# Patient Record
Sex: MALE | Race: WHITE | Employment: OTHER | URBAN - METROPOLITAN AREA
[De-identification: names, ages, dates, MRNs, and addresses within clinical notes are randomized per-mention and may not be internally consistent; named-entity substitution may affect disease eponyms.]

---

## 2017-02-22 DIAGNOSIS — Z13.228 SCREENING FOR METABOLIC DISORDER: Primary | ICD-10-CM

## 2017-02-22 DIAGNOSIS — Z13.220 SCREENING FOR LIPID DISORDERS: ICD-10-CM

## 2017-02-22 DIAGNOSIS — Z13.0 SCREENING FOR DISORDER OF BLOOD AND BLOOD-FORMING ORGANS: ICD-10-CM

## 2017-02-22 DIAGNOSIS — I10 ESSENTIAL HYPERTENSION: ICD-10-CM

## 2017-02-22 DIAGNOSIS — Z13.29 SCREENING FOR THYROID DISORDER: ICD-10-CM

## 2017-02-23 RX ORDER — LISINOPRIL 10 MG/1
10 TABLET ORAL DAILY
Qty: 30 TABLET | Refills: 0 | Status: SHIPPED | OUTPATIENT
Start: 2017-02-23 | End: 2017-03-19

## 2017-02-23 NOTE — TELEPHONE ENCOUNTER
Per protocol;  Failed - Route to provider    Patient is due for repeat cmp (all labs are due) orders have been pended   Routed to  as well for scheduling for an overdue office visit (CPE)

## 2017-02-23 NOTE — TELEPHONE ENCOUNTER
From: Chance Pereira  To:  Monroe Brandon MD  Sent: 2/22/2017 8:44 PM CST  Subject: Medication Renewal Request    Original authorizing provider: MD Chance Meehan would like a refill of the following medications:  lisinopril (PRINIVIL,ZESTRIL)

## 2017-03-16 ENCOUNTER — TELEPHONE (OUTPATIENT)
Dept: INTERNAL MEDICINE CLINIC | Facility: CLINIC | Age: 62
End: 2017-03-16

## 2017-03-16 RX ORDER — LISINOPRIL 10 MG/1
TABLET ORAL
Qty: 30 TABLET | Refills: 0 | Status: SHIPPED | OUTPATIENT
Start: 2017-03-16 | End: 2017-04-12

## 2017-03-16 NOTE — TELEPHONE ENCOUNTER
Pt due for OV and labs which were ordered.  please call to schedule, thanks. Per protocol sent to provider.

## 2017-03-20 RX ORDER — LISINOPRIL 10 MG/1
TABLET ORAL
Qty: 30 TABLET | Refills: 0 | Status: SHIPPED | OUTPATIENT
Start: 2017-03-20 | End: 2020-02-28

## 2017-03-20 NOTE — TELEPHONE ENCOUNTER
Per protocol; failed - route to provider  Patient is due for repeat CMP/BMP- also due for office visit follow up   Routed to  as well for scheduling

## 2017-04-12 RX ORDER — LISINOPRIL 10 MG/1
TABLET ORAL
Qty: 30 TABLET | Refills: 0 | Status: SHIPPED | OUTPATIENT
Start: 2017-04-12 | End: 2017-06-01

## 2017-04-12 NOTE — TELEPHONE ENCOUNTER
Per protocol; failed - route to provider   Patient is due for repeat cmp and office visit for physical - routed to  for scheduling ;

## 2017-05-08 RX ORDER — LISINOPRIL 10 MG/1
TABLET ORAL
Qty: 30 TABLET | Refills: 0 | Status: SHIPPED | OUTPATIENT
Start: 2017-05-08 | End: 2020-02-28

## 2017-06-01 RX ORDER — LISINOPRIL 10 MG/1
TABLET ORAL
Qty: 30 TABLET | Refills: 0 | Status: SHIPPED | OUTPATIENT
Start: 2017-06-01 | End: 2020-02-28

## 2021-03-18 ENCOUNTER — IMMUNIZATION (OUTPATIENT)
Dept: LAB | Age: 66
End: 2021-03-18
Attending: HOSPITALIST
Payer: COMMERCIAL

## 2021-03-18 DIAGNOSIS — Z23 NEED FOR VACCINATION: Primary | ICD-10-CM

## 2021-03-18 PROCEDURE — 0001A SARSCOV2 VAC 30MCG/0.3ML IM: CPT

## 2021-04-08 ENCOUNTER — IMMUNIZATION (OUTPATIENT)
Dept: LAB | Age: 66
End: 2021-04-08
Attending: HOSPITALIST
Payer: COMMERCIAL

## 2021-04-08 DIAGNOSIS — Z23 NEED FOR VACCINATION: Primary | ICD-10-CM

## 2021-04-08 PROCEDURE — 0002A SARSCOV2 VAC 30MCG/0.3ML IM: CPT

## 2021-07-19 ENCOUNTER — OFFICE VISIT (OUTPATIENT)
Dept: INTERNAL MEDICINE CLINIC | Facility: CLINIC | Age: 66
End: 2021-07-19
Payer: COMMERCIAL

## 2021-07-19 VITALS
HEIGHT: 72 IN | SYSTOLIC BLOOD PRESSURE: 122 MMHG | BODY MASS INDEX: 26.14 KG/M2 | DIASTOLIC BLOOD PRESSURE: 82 MMHG | WEIGHT: 193 LBS | RESPIRATION RATE: 18 BRPM

## 2021-07-19 DIAGNOSIS — Z12.5 PROSTATE CANCER SCREENING: ICD-10-CM

## 2021-07-19 DIAGNOSIS — Z85.72 HX OF LYMPHOMA, NON-HODGKINS: ICD-10-CM

## 2021-07-19 DIAGNOSIS — I10 ESSENTIAL HYPERTENSION: ICD-10-CM

## 2021-07-19 DIAGNOSIS — Z00.00 WELLNESS EXAMINATION: Primary | ICD-10-CM

## 2021-07-19 PROCEDURE — 99387 INIT PM E/M NEW PAT 65+ YRS: CPT | Performed by: FAMILY MEDICINE

## 2021-07-19 PROCEDURE — 3079F DIAST BP 80-89 MM HG: CPT | Performed by: FAMILY MEDICINE

## 2021-07-19 PROCEDURE — 3074F SYST BP LT 130 MM HG: CPT | Performed by: FAMILY MEDICINE

## 2021-07-19 PROCEDURE — 3008F BODY MASS INDEX DOCD: CPT | Performed by: FAMILY MEDICINE

## 2021-07-19 RX ORDER — LISINOPRIL 10 MG/1
10 TABLET ORAL DAILY
Qty: 90 TABLET | Refills: 1 | Status: SHIPPED | OUTPATIENT
Start: 2021-07-19 | End: 2022-02-07

## 2021-07-19 NOTE — PROGRESS NOTES
Gage Menendez  5/13/1955    Patient presents with:  New Patient: MR rm 8 NP [facility]       HPI:   Gage Menendez is a 77year old male who presents to [facility].  He works as a  and health  along with [employer] He takes abdominal pain  NEURO: denies headaches    EXAM:   /88 (BP Location: Left arm, Patient Position: Sitting, Cuff Size: adult)   Resp 18   Ht 6' (1.829 m)   Wt 193 lb (87.5 kg)   BMI 26.18 kg/m²   GENERAL: Well developed, well nourished  SKIN: No rashes

## 2022-02-07 ENCOUNTER — OFFICE VISIT (OUTPATIENT)
Dept: INTERNAL MEDICINE CLINIC | Facility: CLINIC | Age: 67
End: 2022-02-07
Payer: COMMERCIAL

## 2022-02-07 VITALS
BODY MASS INDEX: 24.79 KG/M2 | SYSTOLIC BLOOD PRESSURE: 126 MMHG | HEIGHT: 72 IN | WEIGHT: 183 LBS | HEART RATE: 94 BPM | DIASTOLIC BLOOD PRESSURE: 82 MMHG | TEMPERATURE: 98 F

## 2022-02-07 DIAGNOSIS — I10 ESSENTIAL HYPERTENSION: ICD-10-CM

## 2022-02-07 PROCEDURE — 3008F BODY MASS INDEX DOCD: CPT | Performed by: FAMILY MEDICINE

## 2022-02-07 PROCEDURE — 3079F DIAST BP 80-89 MM HG: CPT | Performed by: FAMILY MEDICINE

## 2022-02-07 PROCEDURE — 99213 OFFICE O/P EST LOW 20 MIN: CPT | Performed by: FAMILY MEDICINE

## 2022-02-07 PROCEDURE — 3074F SYST BP LT 130 MM HG: CPT | Performed by: FAMILY MEDICINE

## 2022-02-07 RX ORDER — LISINOPRIL 10 MG/1
10 TABLET ORAL DAILY
Qty: 90 TABLET | Refills: 1 | Status: SHIPPED | OUTPATIENT
Start: 2022-02-07

## 2022-08-22 DIAGNOSIS — I10 ESSENTIAL HYPERTENSION: ICD-10-CM

## 2022-08-23 NOTE — TELEPHONE ENCOUNTER
Last OV 2.7.22 w/ [address] (HTN)   Last PE 7.19.21-Overdue   Last REFILL 2.7.22 Lisinopril 10mg #90 1R  Last LAB No recent labs within last 12 months     Future Appointments   Date Time Provider Nicole Martin   9/1/2022 10:20 AM Liv Chang MD [facility]         Per PROTOCOL?  FAILED-no appt in last 6 months or next 3 months, no CMP or BMP in last 12 months     Please Advise

## 2022-08-24 ENCOUNTER — PATIENT MESSAGE (OUTPATIENT)
Dept: INTERNAL MEDICINE CLINIC | Facility: CLINIC | Age: 67
End: 2022-08-24

## 2022-08-24 RX ORDER — LISINOPRIL 10 MG/1
10 TABLET ORAL DAILY
Qty: 90 TABLET | Refills: 1 | Status: SHIPPED | OUTPATIENT
Start: 2022-08-24

## 2022-08-24 NOTE — TELEPHONE ENCOUNTER
Please advise on patient message regarding labs.   Next office visit - 9/1/22 - Med Check   Last office visit - 2/7/22  Last wellness exam - 7/19/21

## 2022-08-24 NOTE — TELEPHONE ENCOUNTER
From: Radha Leonardo  To: Yadira Corea MD  Sent: 8/24/2022 10:29 AM CDT  Subject: Blood Work    I have an appointment on Thursday, 9/1. Can I also get a blood draw on that same day? My insurance company tells me that if I have a blood draw on the same day as my PCP appointment, my plan will cover the cost. What I need is CBC (Complete Blood Count), CMP (Comprehensive Metabolic Panel), Lipid Panel, and Urine Analysis.

## 2022-08-25 NOTE — TELEPHONE ENCOUNTER
Patient Notified of PCP's recommendations via My Chart. Patient confirmed he is able to fast before upcomming appointment.

## 2022-09-01 ENCOUNTER — LAB ENCOUNTER (OUTPATIENT)
Dept: LAB | Age: 67
End: 2022-09-01
Attending: FAMILY MEDICINE
Payer: COMMERCIAL

## 2022-09-01 ENCOUNTER — OFFICE VISIT (OUTPATIENT)
Dept: INTERNAL MEDICINE CLINIC | Facility: CLINIC | Age: 67
End: 2022-09-01
Payer: COMMERCIAL

## 2022-09-01 VITALS
HEART RATE: 66 BPM | RESPIRATION RATE: 18 BRPM | BODY MASS INDEX: 23.59 KG/M2 | WEIGHT: 174.19 LBS | SYSTOLIC BLOOD PRESSURE: 106 MMHG | DIASTOLIC BLOOD PRESSURE: 70 MMHG | HEIGHT: 72 IN

## 2022-09-01 DIAGNOSIS — I10 ESSENTIAL HYPERTENSION: ICD-10-CM

## 2022-09-01 DIAGNOSIS — Z12.5 PROSTATE CANCER SCREENING: ICD-10-CM

## 2022-09-01 DIAGNOSIS — Z13.220 LIPID SCREENING: ICD-10-CM

## 2022-09-01 DIAGNOSIS — Z00.00 WELLNESS EXAMINATION: ICD-10-CM

## 2022-09-01 DIAGNOSIS — Z13.0 SCREENING FOR DEFICIENCY ANEMIA: ICD-10-CM

## 2022-09-01 DIAGNOSIS — Z00.00 WELLNESS EXAMINATION: Primary | ICD-10-CM

## 2022-09-01 DIAGNOSIS — I10 PRIMARY HYPERTENSION: ICD-10-CM

## 2022-09-01 LAB
ALBUMIN SERPL-MCNC: 3.9 G/DL (ref 3.4–5)
ALBUMIN/GLOB SERPL: 1.3 {RATIO} (ref 1–2)
ALP LIVER SERPL-CCNC: 49 U/L
ALT SERPL-CCNC: 22 U/L
ANION GAP SERPL CALC-SCNC: 7 MMOL/L (ref 0–18)
AST SERPL-CCNC: 14 U/L (ref 15–37)
BASOPHILS # BLD AUTO: 0.06 X10(3) UL (ref 0–0.2)
BASOPHILS NFR BLD AUTO: 0.9 %
BILIRUB SERPL-MCNC: 1.1 MG/DL (ref 0.1–2)
BUN BLD-MCNC: 12 MG/DL (ref 7–18)
BUN/CREAT SERPL: 14.6 (ref 10–20)
CALCIUM BLD-MCNC: 8.9 MG/DL (ref 8.5–10.1)
CHLORIDE SERPL-SCNC: 108 MMOL/L (ref 98–112)
CHOLEST SERPL-MCNC: 211 MG/DL (ref ?–200)
CO2 SERPL-SCNC: 24 MMOL/L (ref 21–32)
COMPLEXED PSA SERPL-MCNC: 8.22 NG/ML (ref ?–4)
CREAT BLD-MCNC: 0.82 MG/DL
DEPRECATED RDW RBC AUTO: 40.9 FL (ref 35.1–46.3)
EOSINOPHIL # BLD AUTO: 0.52 X10(3) UL (ref 0–0.7)
EOSINOPHIL NFR BLD AUTO: 7.6 %
ERYTHROCYTE [DISTWIDTH] IN BLOOD BY AUTOMATED COUNT: 11.8 % (ref 11–15)
FASTING PATIENT LIPID ANSWER: YES
FASTING STATUS PATIENT QL REPORTED: YES
GFR SERPLBLD BASED ON 1.73 SQ M-ARVRAT: 96 ML/MIN/1.73M2 (ref 60–?)
GLOBULIN PLAS-MCNC: 3 G/DL (ref 2.8–4.4)
GLUCOSE BLD-MCNC: 96 MG/DL (ref 70–99)
HCT VFR BLD AUTO: 48.5 %
HDLC SERPL-MCNC: 53 MG/DL (ref 40–59)
HGB BLD-MCNC: 16.8 G/DL
IMM GRANULOCYTES # BLD AUTO: 0.02 X10(3) UL (ref 0–1)
IMM GRANULOCYTES NFR BLD: 0.3 %
LDLC SERPL CALC-MCNC: 148 MG/DL (ref ?–100)
LYMPHOCYTES # BLD AUTO: 1.38 X10(3) UL (ref 1–4)
LYMPHOCYTES NFR BLD AUTO: 20.2 %
MCH RBC QN AUTO: 32.1 PG (ref 26–34)
MCHC RBC AUTO-ENTMCNC: 34.6 G/DL (ref 31–37)
MCV RBC AUTO: 92.7 FL
MONOCYTES # BLD AUTO: 0.54 X10(3) UL (ref 0.1–1)
MONOCYTES NFR BLD AUTO: 7.9 %
NEUTROPHILS # BLD AUTO: 4.31 X10 (3) UL (ref 1.5–7.7)
NEUTROPHILS # BLD AUTO: 4.31 X10(3) UL (ref 1.5–7.7)
NEUTROPHILS NFR BLD AUTO: 63.1 %
NONHDLC SERPL-MCNC: 158 MG/DL (ref ?–130)
OSMOLALITY SERPL CALC.SUM OF ELEC: 288 MOSM/KG (ref 275–295)
PLATELET # BLD AUTO: 163 10(3)UL (ref 150–450)
POTASSIUM SERPL-SCNC: 3.9 MMOL/L (ref 3.5–5.1)
PROT SERPL-MCNC: 6.9 G/DL (ref 6.4–8.2)
RBC # BLD AUTO: 5.23 X10(6)UL
SODIUM SERPL-SCNC: 139 MMOL/L (ref 136–145)
TRIGL SERPL-MCNC: 57 MG/DL (ref 30–149)
VLDLC SERPL CALC-MCNC: 11 MG/DL (ref 0–30)
WBC # BLD AUTO: 6.8 X10(3) UL (ref 4–11)

## 2022-09-01 PROCEDURE — 3008F BODY MASS INDEX DOCD: CPT | Performed by: FAMILY MEDICINE

## 2022-09-01 PROCEDURE — 80061 LIPID PANEL: CPT

## 2022-09-01 PROCEDURE — 85025 COMPLETE CBC W/AUTO DIFF WBC: CPT

## 2022-09-01 PROCEDURE — 36415 COLL VENOUS BLD VENIPUNCTURE: CPT

## 2022-09-01 PROCEDURE — 3078F DIAST BP <80 MM HG: CPT | Performed by: FAMILY MEDICINE

## 2022-09-01 PROCEDURE — 80053 COMPREHEN METABOLIC PANEL: CPT

## 2022-09-01 PROCEDURE — 99397 PER PM REEVAL EST PAT 65+ YR: CPT | Performed by: FAMILY MEDICINE

## 2022-09-01 PROCEDURE — 3074F SYST BP LT 130 MM HG: CPT | Performed by: FAMILY MEDICINE

## 2022-09-01 RX ORDER — LISINOPRIL 10 MG/1
10 TABLET ORAL DAILY
Qty: 90 TABLET | Refills: 3 | Status: SHIPPED | OUTPATIENT
Start: 2022-09-01

## 2022-09-08 ENCOUNTER — PATIENT MESSAGE (OUTPATIENT)
Dept: INTERNAL MEDICINE CLINIC | Facility: CLINIC | Age: 67
End: 2022-09-08

## 2024-02-05 DIAGNOSIS — I10 ESSENTIAL HYPERTENSION: ICD-10-CM

## 2024-02-06 RX ORDER — LISINOPRIL 10 MG/1
10 TABLET ORAL DAILY
Qty: 30 TABLET | Refills: 0 | Status: SHIPPED | OUTPATIENT
Start: 2024-02-06

## 2024-02-06 NOTE — TELEPHONE ENCOUNTER
Last VISIT 9/1/2022-[facility]    Last CPE 9/1/2022    Last REFILL  lisinopril 10 MG Oral Tab 90 tablet 3 9/1/2022 --    Sig - Route: Take 1 tablet (10 mg total) by mouth daily. - Oral    Sent to pharmacy as: Lisinopril 10 MG Oral Tablet (Zestril)    E-Prescribing Status: Receipt confirmed by pharmacy (9/1/2022 10:25 AM CDT)        Last LABS 9/1/2022-Lipid,CBC,CMP    No future appointments.      Per PROTOCOL? Failed    Please Approve or Deny.

## 2024-03-06 DIAGNOSIS — I10 ESSENTIAL HYPERTENSION: ICD-10-CM

## 2024-03-06 RX ORDER — LISINOPRIL 10 MG/1
10 TABLET ORAL DAILY
Qty: 30 TABLET | Refills: 0 | Status: SHIPPED | OUTPATIENT
Start: 2024-03-06

## 2024-03-06 NOTE — TELEPHONE ENCOUNTER
Last VISIT:09/01/2022 MD HA    Last CPE:09/01/2022    Last REFILL:02/06/2024   Medication Quantity Refills Start End   LISINOPRIL 10 MG Oral Tab 30 tablet 0         Last LABS:09/01/2022    No future appointments.      Per PROTOCOL? Non Protocol    Please Approve or Deny.

## 2024-05-28 ENCOUNTER — PATIENT OUTREACH (OUTPATIENT)
Dept: CASE MANAGEMENT | Age: 69
End: 2024-05-28

## 2024-05-28 NOTE — PROCEDURES
The office order for PCP removal request is Approved and finalized on May 28, 2024.    Thanks,  [facility] Team

## 2025-01-01 ENCOUNTER — APPOINTMENT (OUTPATIENT)
Dept: GENERAL RADIOLOGY | Facility: HOSPITAL | Age: 70
End: 2025-01-01
Attending: NURSE PRACTITIONER
Payer: MEDICARE

## 2025-01-01 ENCOUNTER — APPOINTMENT (OUTPATIENT)
Facility: HOSPITAL | Age: 70
End: 2025-01-01
Attending: STUDENT IN AN ORGANIZED HEALTH CARE EDUCATION/TRAINING PROGRAM
Payer: MEDICARE

## 2025-01-01 ENCOUNTER — APPOINTMENT (OUTPATIENT)
Dept: MRI IMAGING | Facility: HOSPITAL | Age: 70
End: 2025-01-01
Attending: STUDENT IN AN ORGANIZED HEALTH CARE EDUCATION/TRAINING PROGRAM
Payer: MEDICARE

## 2025-01-01 ENCOUNTER — APPOINTMENT (OUTPATIENT)
Dept: INTERVENTIONAL RADIOLOGY/VASCULAR | Facility: HOSPITAL | Age: 70
End: 2025-01-01
Attending: INTERNAL MEDICINE
Payer: MEDICARE

## 2025-01-01 ENCOUNTER — APPOINTMENT (OUTPATIENT)
Dept: GENERAL RADIOLOGY | Facility: HOSPITAL | Age: 70
End: 2025-01-01
Attending: EMERGENCY MEDICINE
Payer: MEDICARE

## 2025-01-01 ENCOUNTER — HOSPITAL ENCOUNTER (OUTPATIENT)
Dept: NUCLEAR MEDICINE | Facility: HOSPITAL | Age: 70
Discharge: HOME | End: 2025-01-01
Attending: STUDENT IN AN ORGANIZED HEALTH CARE EDUCATION/TRAINING PROGRAM
Payer: MEDICARE

## 2025-01-01 ENCOUNTER — HOSPITAL ENCOUNTER (INPATIENT)
Facility: HOSPITAL | Age: 70
LOS: 12 days | End: 2025-01-01
Attending: INTERNAL MEDICINE | Admitting: INTERNAL MEDICINE
Payer: MEDICARE

## 2025-01-01 ENCOUNTER — APPOINTMENT (OUTPATIENT)
Dept: CV DIAGNOSTICS | Facility: HOSPITAL | Age: 70
End: 2025-01-01
Attending: INTERNAL MEDICINE
Payer: MEDICARE

## 2025-01-01 VITALS
BODY MASS INDEX: 24 KG/M2 | TEMPERATURE: 104 F | OXYGEN SATURATION: 83 % | HEART RATE: 120 BPM | RESPIRATION RATE: 14 BRPM | WEIGHT: 173.38 LBS | SYSTOLIC BLOOD PRESSURE: 70 MMHG | DIASTOLIC BLOOD PRESSURE: 50 MMHG

## 2025-01-01 DIAGNOSIS — C83.33 DIFFUSE LARGE B-CELL LYMPHOMA OF INTRA-ABDOMINAL LYMPH NODES (HCC): ICD-10-CM

## 2025-01-01 LAB
ALBUMIN SERPL-MCNC: 2.8 G/DL (ref 3.2–4.8)
ALBUMIN SERPL-MCNC: 2.9 G/DL (ref 3.2–4.8)
ALBUMIN SERPL-MCNC: 3.1 G/DL (ref 3.2–4.8)
ALBUMIN SERPL-MCNC: 3.1 G/DL (ref 3.2–4.8)
ALBUMIN SERPL-MCNC: 3.2 G/DL (ref 3.2–4.8)
ALBUMIN SERPL-MCNC: 3.3 G/DL (ref 3.2–4.8)
ALBUMIN SERPL-MCNC: 3.4 G/DL (ref 3.2–4.8)
ALBUMIN SERPL-MCNC: 3.7 G/DL (ref 3.2–4.8)
ALBUMIN SERPL-MCNC: 3.8 G/DL (ref 3.2–4.8)
ALBUMIN SERPL-MCNC: 3.9 G/DL (ref 3.2–4.8)
ALBUMIN SERPL-MCNC: 3.9 G/DL (ref 3.2–4.8)
ALBUMIN SERPL-MCNC: 4.2 G/DL (ref 3.2–4.8)
ALBUMIN/GLOB SERPL: 1.6 {RATIO} (ref 1–2)
ALBUMIN/GLOB SERPL: 1.8 {RATIO} (ref 1–2)
ALBUMIN/GLOB SERPL: 1.9 {RATIO} (ref 1–2)
ALBUMIN/GLOB SERPL: 1.9 {RATIO} (ref 1–2)
ALP LIVER SERPL-CCNC: 106 U/L (ref 45–117)
ALP LIVER SERPL-CCNC: 123 U/L (ref 45–117)
ALP LIVER SERPL-CCNC: 137 U/L (ref 45–117)
ALP LIVER SERPL-CCNC: 138 U/L (ref 45–117)
ALP LIVER SERPL-CCNC: 149 U/L (ref 45–117)
ALP LIVER SERPL-CCNC: 176 U/L (ref 45–117)
ALP LIVER SERPL-CCNC: 177 U/L (ref 45–117)
ALP LIVER SERPL-CCNC: 209 U/L (ref 45–117)
ALP LIVER SERPL-CCNC: 90 U/L (ref 45–117)
ALP LIVER SERPL-CCNC: 91 U/L (ref 45–117)
ALP LIVER SERPL-CCNC: 92 U/L (ref 45–117)
ALP LIVER SERPL-CCNC: 97 U/L (ref 45–117)
ALT SERPL-CCNC: 148 U/L (ref 10–49)
ALT SERPL-CCNC: 27 U/L (ref 10–49)
ALT SERPL-CCNC: 28 U/L (ref 10–49)
ALT SERPL-CCNC: 28 U/L (ref 10–49)
ALT SERPL-CCNC: 37 U/L (ref 10–49)
ALT SERPL-CCNC: 37 U/L (ref 10–49)
ALT SERPL-CCNC: 50 U/L (ref 10–49)
ALT SERPL-CCNC: 50 U/L (ref 10–49)
ALT SERPL-CCNC: 63 U/L (ref 10–49)
ALT SERPL-CCNC: 79 U/L (ref 10–49)
ANION GAP SERPL CALC-SCNC: 11 MMOL/L (ref 0–18)
ANION GAP SERPL CALC-SCNC: 11 MMOL/L (ref 0–18)
ANION GAP SERPL CALC-SCNC: 12 MMOL/L (ref 0–18)
ANION GAP SERPL CALC-SCNC: 12 MMOL/L (ref 0–18)
ANION GAP SERPL CALC-SCNC: 13 MMOL/L (ref 0–18)
ANION GAP SERPL CALC-SCNC: 15 MMOL/L (ref 0–18)
ANION GAP SERPL CALC-SCNC: 15 MMOL/L (ref 0–18)
ANION GAP SERPL CALC-SCNC: 16 MMOL/L (ref 0–18)
ANION GAP SERPL CALC-SCNC: 17 MMOL/L (ref 0–18)
ANION GAP SERPL CALC-SCNC: 18 MMOL/L (ref 0–18)
ANION GAP SERPL CALC-SCNC: 18 MMOL/L (ref 0–18)
ANION GAP SERPL CALC-SCNC: 24 MMOL/L (ref 0–18)
ANION GAP SERPL CALC-SCNC: 9 MMOL/L (ref 0–18)
ANION GAP SERPL CALC-SCNC: 9 MMOL/L (ref 0–18)
ARTERIAL PATENCY WRIST A: POSITIVE
AST SERPL-CCNC: 167 U/L (ref ?–34)
AST SERPL-CCNC: 21 U/L (ref ?–34)
AST SERPL-CCNC: 212 U/L (ref ?–34)
AST SERPL-CCNC: 30 U/L (ref ?–34)
AST SERPL-CCNC: 377 U/L (ref ?–34)
AST SERPL-CCNC: 42 U/L (ref ?–34)
AST SERPL-CCNC: 58 U/L (ref ?–34)
AST SERPL-CCNC: 58 U/L (ref ?–34)
AST SERPL-CCNC: 67 U/L (ref ?–34)
AST SERPL-CCNC: 71 U/L (ref ?–34)
AST SERPL-CCNC: 77 U/L (ref ?–34)
AST SERPL-CCNC: 99 U/L (ref ?–34)
ATRIAL RATE: 102 BPM
BACTERIA BLD CULT: POSITIVE
BASE EXCESS BLDA CALC-SCNC: -4.9 MMOL/L (ref ?–2)
BASE EXCESS BLDA CALC-SCNC: -6.8 MMOL/L (ref ?–2)
BASE EXCESS BLDV CALC-SCNC: -3.4 MMOL/L
BASOPHILS # BLD AUTO: 0 X10(3) UL (ref 0–0.2)
BASOPHILS # BLD AUTO: 0 X10(3) UL (ref 0–0.2)
BASOPHILS # BLD AUTO: 0.01 X10(3) UL (ref 0–0.2)
BASOPHILS # BLD AUTO: 0.02 X10(3) UL (ref 0–0.2)
BASOPHILS # BLD AUTO: 0.08 X10(3) UL (ref 0–0.2)
BASOPHILS # BLD AUTO: 0.09 X10(3) UL (ref 0–0.2)
BASOPHILS # BLD: 0 X10(3) UL (ref 0–0.2)
BASOPHILS # BLD: 0 X10(3) UL (ref 0–0.2)
BASOPHILS NFR BLD AUTO: 0 %
BASOPHILS NFR BLD AUTO: 0 %
BASOPHILS NFR BLD AUTO: 0.2 %
BASOPHILS NFR BLD AUTO: 0.2 %
BASOPHILS NFR BLD AUTO: 0.7 %
BASOPHILS NFR BLD AUTO: 0.9 %
BASOPHILS NFR BLD: 0 %
BASOPHILS NFR BLD: 0 %
BILIRUB SERPL-MCNC: 0.3 MG/DL (ref 0.2–1.1)
BILIRUB SERPL-MCNC: 0.4 MG/DL (ref 0.2–1.1)
BILIRUB SERPL-MCNC: 0.4 MG/DL (ref 0.2–1.1)
BILIRUB SERPL-MCNC: 0.6 MG/DL (ref 0.2–1.1)
BILIRUB SERPL-MCNC: 0.7 MG/DL (ref 0.2–1.1)
BILIRUB SERPL-MCNC: 0.8 MG/DL (ref 0.2–1.1)
BILIRUB SERPL-MCNC: 0.9 MG/DL (ref 0.2–1.1)
BILIRUB SERPL-MCNC: 1 MG/DL (ref 0.2–1.1)
BILIRUB SERPL-MCNC: 1.1 MG/DL (ref 0.2–1.1)
BILIRUB SERPL-MCNC: 1.2 MG/DL (ref 0.2–1.1)
BILIRUB SERPL-MCNC: 1.4 MG/DL (ref 0.2–1.1)
BILIRUB SERPL-MCNC: 1.9 MG/DL (ref 0.2–1.1)
BILIRUB UR QL STRIP.AUTO: NEGATIVE
BLACTX-M ISLT/SPM QL: NOT DETECTED
BODY TEMPERATURE: 98.6 F
BODY TEMPERATURE: 98.6 F
BUN BLD-MCNC: 28 MG/DL (ref 9–23)
BUN BLD-MCNC: 32 MG/DL (ref 9–23)
BUN BLD-MCNC: 38 MG/DL (ref 9–23)
BUN BLD-MCNC: 41 MG/DL (ref 9–23)
BUN BLD-MCNC: 42 MG/DL (ref 9–23)
BUN BLD-MCNC: 44 MG/DL (ref 9–23)
BUN BLD-MCNC: 44 MG/DL (ref 9–23)
BUN BLD-MCNC: 45 MG/DL (ref 9–23)
BUN BLD-MCNC: 46 MG/DL (ref 9–23)
BUN BLD-MCNC: 48 MG/DL (ref 9–23)
BUN BLD-MCNC: 49 MG/DL (ref 9–23)
BUN BLD-MCNC: 56 MG/DL (ref 9–23)
BUN BLD-MCNC: 58 MG/DL (ref 9–23)
BUN BLD-MCNC: 58 MG/DL (ref 9–23)
BUN BLD-MCNC: 66 MG/DL (ref 9–23)
BUN BLD-MCNC: 77 MG/DL (ref 9–23)
BUN BLD-MCNC: 80 MG/DL (ref 9–23)
CA-I BLD-SCNC: 1.28 MMOL/L (ref 0.95–1.32)
CALCIUM BLD-MCNC: 10.7 MG/DL (ref 8.7–10.6)
CALCIUM BLD-MCNC: 11 MG/DL (ref 8.7–10.6)
CALCIUM BLD-MCNC: 11.2 MG/DL (ref 8.7–10.6)
CALCIUM BLD-MCNC: 11.8 MG/DL (ref 8.7–10.6)
CALCIUM BLD-MCNC: 6.1 MG/DL (ref 8.7–10.6)
CALCIUM BLD-MCNC: 6.6 MG/DL (ref 8.7–10.6)
CALCIUM BLD-MCNC: 6.6 MG/DL (ref 8.7–10.6)
CALCIUM BLD-MCNC: 6.7 MG/DL (ref 8.7–10.6)
CALCIUM BLD-MCNC: 6.7 MG/DL (ref 8.7–10.6)
CALCIUM BLD-MCNC: 6.9 MG/DL (ref 8.7–10.6)
CALCIUM BLD-MCNC: 7 MG/DL (ref 8.7–10.6)
CALCIUM BLD-MCNC: 7 MG/DL (ref 8.7–10.6)
CALCIUM BLD-MCNC: 7.4 MG/DL (ref 8.7–10.6)
CALCIUM BLD-MCNC: 7.5 MG/DL (ref 8.7–10.6)
CALCIUM BLD-MCNC: 7.6 MG/DL (ref 8.7–10.6)
CALCIUM BLD-MCNC: 7.6 MG/DL (ref 8.7–10.6)
CALCIUM BLD-MCNC: 9.7 MG/DL (ref 8.7–10.6)
CHLORIDE SERPL-SCNC: 101 MMOL/L (ref 98–112)
CHLORIDE SERPL-SCNC: 102 MMOL/L (ref 98–112)
CHLORIDE SERPL-SCNC: 103 MMOL/L (ref 98–112)
CHLORIDE SERPL-SCNC: 104 MMOL/L (ref 98–112)
CHLORIDE SERPL-SCNC: 104 MMOL/L (ref 98–112)
CHLORIDE SERPL-SCNC: 105 MMOL/L (ref 98–112)
CHLORIDE SERPL-SCNC: 106 MMOL/L (ref 98–112)
CHLORIDE SERPL-SCNC: 106 MMOL/L (ref 98–112)
CHLORIDE SERPL-SCNC: 107 MMOL/L (ref 98–112)
CHLORIDE SERPL-SCNC: 108 MMOL/L (ref 98–112)
CHLORIDE SERPL-SCNC: 109 MMOL/L (ref 98–112)
CHLORIDE SERPL-SCNC: 112 MMOL/L (ref 98–112)
CHLORIDE SERPL-SCNC: 88 MMOL/L (ref 98–112)
CHLORIDE SERPL-SCNC: 90 MMOL/L (ref 98–112)
CHLORIDE SERPL-SCNC: 92 MMOL/L (ref 98–112)
CHLORIDE SERPL-SCNC: 94 MMOL/L (ref 98–112)
CHLORIDE SERPL-SCNC: 99 MMOL/L (ref 98–112)
CHOLEST SERPL-MCNC: 256 MG/DL (ref ?–200)
CLARITY CSF: CLEAR
CO2 SERPL-SCNC: 19 MMOL/L (ref 21–32)
CO2 SERPL-SCNC: 20 MMOL/L (ref 21–32)
CO2 SERPL-SCNC: 21 MMOL/L (ref 21–32)
CO2 SERPL-SCNC: 22 MMOL/L (ref 21–32)
CO2 SERPL-SCNC: 23 MMOL/L (ref 21–32)
CO2 SERPL-SCNC: 23 MMOL/L (ref 21–32)
CO2 SERPL-SCNC: 24 MMOL/L (ref 21–32)
CO2 SERPL-SCNC: 24 MMOL/L (ref 21–32)
CO2 SERPL-SCNC: 25 MMOL/L (ref 21–32)
CO2 SERPL-SCNC: 25 MMOL/L (ref 21–32)
CO2 SERPL-SCNC: 26 MMOL/L (ref 21–32)
COHGB MFR BLD: 1.9 % SAT (ref 0–3)
COHGB MFR BLD: 2.4 % SAT (ref 0–3)
COLOR CSF: COLORLESS
COUNT PERFORMED ON TUBE: 4
CREAT BLD-MCNC: 1.02 MG/DL (ref 0.7–1.3)
CREAT BLD-MCNC: 1.02 MG/DL (ref 0.7–1.3)
CREAT BLD-MCNC: 1.07 MG/DL (ref 0.7–1.3)
CREAT BLD-MCNC: 1.27 MG/DL (ref 0.7–1.3)
CREAT BLD-MCNC: 1.29 MG/DL (ref 0.7–1.3)
CREAT BLD-MCNC: 1.34 MG/DL (ref 0.7–1.3)
CREAT BLD-MCNC: 1.42 MG/DL (ref 0.7–1.3)
CREAT BLD-MCNC: 1.47 MG/DL (ref 0.7–1.3)
CREAT BLD-MCNC: 1.54 MG/DL (ref 0.7–1.3)
CREAT BLD-MCNC: 1.61 MG/DL (ref 0.7–1.3)
CREAT BLD-MCNC: 1.61 MG/DL (ref 0.7–1.3)
CREAT BLD-MCNC: 1.8 MG/DL (ref 0.7–1.3)
CREAT BLD-MCNC: 1.9 MG/DL (ref 0.7–1.3)
CREAT BLD-MCNC: 1.94 MG/DL (ref 0.7–1.3)
CREAT BLD-MCNC: 2.12 MG/DL (ref 0.7–1.3)
CREAT BLD-MCNC: 2.15 MG/DL (ref 0.7–1.3)
CREAT BLD-MCNC: 2.32 MG/DL (ref 0.7–1.3)
E COLI DNA BLD POS QL NAA+NON-PROBE: DETECTED
EGFRCR SERPLBLD CKD-EPI 2021: 30 ML/MIN/1.73M2 (ref 60–?)
EGFRCR SERPLBLD CKD-EPI 2021: 33 ML/MIN/1.73M2 (ref 60–?)
EGFRCR SERPLBLD CKD-EPI 2021: 33 ML/MIN/1.73M2 (ref 60–?)
EGFRCR SERPLBLD CKD-EPI 2021: 37 ML/MIN/1.73M2 (ref 60–?)
EGFRCR SERPLBLD CKD-EPI 2021: 38 ML/MIN/1.73M2 (ref 60–?)
EGFRCR SERPLBLD CKD-EPI 2021: 40 ML/MIN/1.73M2 (ref 60–?)
EGFRCR SERPLBLD CKD-EPI 2021: 46 ML/MIN/1.73M2 (ref 60–?)
EGFRCR SERPLBLD CKD-EPI 2021: 46 ML/MIN/1.73M2 (ref 60–?)
EGFRCR SERPLBLD CKD-EPI 2021: 49 ML/MIN/1.73M2 (ref 60–?)
EGFRCR SERPLBLD CKD-EPI 2021: 51 ML/MIN/1.73M2 (ref 60–?)
EGFRCR SERPLBLD CKD-EPI 2021: 53 ML/MIN/1.73M2 (ref 60–?)
EGFRCR SERPLBLD CKD-EPI 2021: 57 ML/MIN/1.73M2 (ref 60–?)
EGFRCR SERPLBLD CKD-EPI 2021: 60 ML/MIN/1.73M2 (ref 60–?)
EGFRCR SERPLBLD CKD-EPI 2021: 61 ML/MIN/1.73M2 (ref 60–?)
EGFRCR SERPLBLD CKD-EPI 2021: 75 ML/MIN/1.73M2 (ref 60–?)
EGFRCR SERPLBLD CKD-EPI 2021: 80 ML/MIN/1.73M2 (ref 60–?)
EGFRCR SERPLBLD CKD-EPI 2021: 80 ML/MIN/1.73M2 (ref 60–?)
EOSINOPHIL # BLD AUTO: 0 X10(3) UL (ref 0–0.7)
EOSINOPHIL # BLD AUTO: 0.01 X10(3) UL (ref 0–0.7)
EOSINOPHIL # BLD AUTO: 0.03 X10(3) UL (ref 0–0.7)
EOSINOPHIL # BLD AUTO: 0.06 X10(3) UL (ref 0–0.7)
EOSINOPHIL # BLD: 0 X10(3) UL (ref 0–0.7)
EOSINOPHIL # BLD: 0.02 X10(3) UL (ref 0–0.7)
EOSINOPHIL NFR BLD AUTO: 0 %
EOSINOPHIL NFR BLD AUTO: 0.1 %
EOSINOPHIL NFR BLD AUTO: 0.2 %
EOSINOPHIL NFR BLD AUTO: 0.5 %
EOSINOPHIL NFR BLD AUTO: 12.5 %
EOSINOPHIL NFR BLD AUTO: 4.8 %
EOSINOPHIL NFR BLD: 0 %
EOSINOPHIL NFR BLD: 12 %
ERYTHROCYTE [DISTWIDTH] IN BLOOD BY AUTOMATED COUNT: 11.7 %
ERYTHROCYTE [DISTWIDTH] IN BLOOD BY AUTOMATED COUNT: 11.8 %
ERYTHROCYTE [DISTWIDTH] IN BLOOD BY AUTOMATED COUNT: 11.8 %
ERYTHROCYTE [DISTWIDTH] IN BLOOD BY AUTOMATED COUNT: 11.9 %
FIO2: 100 %
FIO2: 100 %
GLOBULIN PLAS-MCNC: 1.6 G/DL (ref 2–3.5)
GLOBULIN PLAS-MCNC: 1.7 G/DL (ref 2–3.5)
GLOBULIN PLAS-MCNC: 1.8 G/DL (ref 2–3.5)
GLOBULIN PLAS-MCNC: 1.8 G/DL (ref 2–3.5)
GLOBULIN PLAS-MCNC: 1.9 G/DL (ref 2–3.5)
GLOBULIN PLAS-MCNC: 2.1 G/DL (ref 2–3.5)
GLOBULIN PLAS-MCNC: 2.2 G/DL (ref 2–3.5)
GLOBULIN PLAS-MCNC: 2.2 G/DL (ref 2–3.5)
GLUCOSE BLD-MCNC: 102 MG/DL (ref 70–99)
GLUCOSE BLD-MCNC: 102 MG/DL (ref 70–99)
GLUCOSE BLD-MCNC: 105 MG/DL (ref 70–99)
GLUCOSE BLD-MCNC: 109 MG/DL (ref 70–99)
GLUCOSE BLD-MCNC: 109 MG/DL (ref 70–99)
GLUCOSE BLD-MCNC: 128 MG/DL (ref 70–99)
GLUCOSE BLD-MCNC: 129 MG/DL (ref 70–99)
GLUCOSE BLD-MCNC: 140 MG/DL (ref 70–99)
GLUCOSE BLD-MCNC: 149 MG/DL (ref 70–99)
GLUCOSE BLD-MCNC: 152 MG/DL (ref 70–99)
GLUCOSE BLD-MCNC: 152 MG/DL (ref 70–99)
GLUCOSE BLD-MCNC: 159 MG/DL (ref 70–99)
GLUCOSE BLD-MCNC: 173 MG/DL (ref 70–99)
GLUCOSE BLD-MCNC: 197 MG/DL (ref 70–99)
GLUCOSE BLD-MCNC: 224 MG/DL (ref 70–99)
GLUCOSE BLD-MCNC: 226 MG/DL (ref 70–99)
GLUCOSE BLD-MCNC: 234 MG/DL (ref 70–99)
GLUCOSE BLD-MCNC: 61 MG/DL (ref 70–99)
GLUCOSE BLD-MCNC: 66 MG/DL (ref 70–99)
GLUCOSE BLD-MCNC: 87 MG/DL (ref 70–99)
GLUCOSE BLD-MCNC: 89 MG/DL (ref 70–99)
GLUCOSE BLD-MCNC: 93 MG/DL (ref 70–99)
GLUCOSE BLD-MCNC: 93 MG/DL (ref 70–99)
GLUCOSE UR STRIP.AUTO-MCNC: NORMAL MG/DL
HBV SURFACE AG SER-ACNC: 0.12 [IU]/L
HBV SURFACE AG SERPL QL IA: NONREACTIVE
HCO3 BLDA-SCNC: 19.4 MEQ/L (ref 21–27)
HCO3 BLDA-SCNC: 20.9 MEQ/L (ref 21–27)
HCO3 BLDV-SCNC: 22.3 MEQ/L (ref 22–26)
HCT VFR BLD AUTO: 35.1 % (ref 39–53)
HCT VFR BLD AUTO: 36.4 % (ref 39–53)
HCT VFR BLD AUTO: 36.7 % (ref 39–53)
HCT VFR BLD AUTO: 37 % (ref 39–53)
HCT VFR BLD AUTO: 40.3 % (ref 39–53)
HCT VFR BLD AUTO: 41.3 % (ref 39–53)
HCT VFR BLD AUTO: 42.8 % (ref 39–53)
HCT VFR BLD AUTO: 45.5 % (ref 39–53)
HDLC SERPL-MCNC: 38 MG/DL (ref 40–59)
HGB BLD-MCNC: 11.5 G/DL (ref 13–17.5)
HGB BLD-MCNC: 12.1 G/DL (ref 13–17.5)
HGB BLD-MCNC: 12.5 G/DL (ref 13–17.5)
HGB BLD-MCNC: 12.6 G/DL (ref 13–17.5)
HGB BLD-MCNC: 12.9 G/DL (ref 13–17.5)
HGB BLD-MCNC: 14 G/DL (ref 13–17.5)
HGB BLD-MCNC: 14.1 G/DL (ref 13–17.5)
HGB BLD-MCNC: 14.6 G/DL (ref 13–17.5)
HGB BLD-MCNC: 14.8 G/DL (ref 13–17.5)
HGB BLD-MCNC: 15.4 G/DL (ref 13–17.5)
IMM GRANULOCYTES # BLD AUTO: 0 X10(3) UL (ref 0–1)
IMM GRANULOCYTES # BLD AUTO: 0 X10(3) UL (ref 0–1)
IMM GRANULOCYTES # BLD AUTO: 0.03 X10(3) UL (ref 0–1)
IMM GRANULOCYTES # BLD AUTO: 0.14 X10(3) UL (ref 0–1)
IMM GRANULOCYTES # BLD AUTO: 0.29 X10(3) UL (ref 0–1)
IMM GRANULOCYTES # BLD AUTO: 0.41 X10(3) UL (ref 0–1)
IMM GRANULOCYTES NFR BLD: 0 %
IMM GRANULOCYTES NFR BLD: 0 %
IMM GRANULOCYTES NFR BLD: 0.6 %
IMM GRANULOCYTES NFR BLD: 1.6 %
IMM GRANULOCYTES NFR BLD: 2.6 %
IMM GRANULOCYTES NFR BLD: 4 %
INR BLD: 1.12 (ref 0.8–1.2)
INR BLD: 1.38 (ref 0.8–1.2)
KETONES UR STRIP.AUTO-MCNC: NEGATIVE MG/DL
LACTATE BLD-SCNC: 5.3 MMOL/L (ref 0.5–2)
LDH SERPL L TO P-CCNC: 1674 U/L (ref 120–246)
LDH SERPL L TO P-CCNC: 2315 U/L (ref 120–246)
LDH SERPL L TO P-CCNC: 2514 U/L (ref 120–246)
LDH SERPL L TO P-CCNC: 3279 U/L (ref 120–246)
LDH SERPL L TO P-CCNC: 3553 U/L (ref 120–246)
LDH SERPL L TO P-CCNC: 3556 U/L (ref 120–246)
LDH SERPL L TO P-CCNC: >4500 U/L (ref 120–246)
LDLC SERPL CALC-MCNC: 171 MG/DL (ref ?–100)
LEUKOCYTE ESTERASE UR QL STRIP.AUTO: NEGATIVE
LYMPHOCYTES # BLD AUTO: 0.1 X10(3) UL (ref 1–4)
LYMPHOCYTES # BLD AUTO: 0.13 X10(3) UL (ref 1–4)
LYMPHOCYTES # BLD AUTO: 0.16 X10(3) UL (ref 1–4)
LYMPHOCYTES # BLD AUTO: 0.17 X10(3) UL (ref 1–4)
LYMPHOCYTES # BLD AUTO: 0.95 X10(3) UL (ref 1–4)
LYMPHOCYTES # BLD AUTO: 1.15 X10(3) UL (ref 1–4)
LYMPHOCYTES NFR BLD AUTO: 1.1 %
LYMPHOCYTES NFR BLD AUTO: 11.3 %
LYMPHOCYTES NFR BLD AUTO: 2.5 %
LYMPHOCYTES NFR BLD AUTO: 66.7 %
LYMPHOCYTES NFR BLD AUTO: 8.6 %
LYMPHOCYTES NFR BLD AUTO: 81 %
LYMPHOCYTES NFR BLD: 0.14 X10(3) UL (ref 1–4)
LYMPHOCYTES NFR BLD: 0.23 X10(3) UL (ref 1–4)
LYMPHOCYTES NFR BLD: 2 %
LYMPHOCYTES NFR BLD: 68 %
MAGNESIUM SERPL-MCNC: 2 MG/DL (ref 1.6–2.6)
MAGNESIUM SERPL-MCNC: 2.1 MG/DL (ref 1.6–2.6)
MAGNESIUM SERPL-MCNC: 2.4 MG/DL (ref 1.6–2.6)
MAGNESIUM SERPL-MCNC: 2.6 MG/DL (ref 1.6–2.6)
MCH RBC QN AUTO: 30.3 PG (ref 26–34)
MCH RBC QN AUTO: 30.7 PG (ref 26–34)
MCH RBC QN AUTO: 30.7 PG (ref 26–34)
MCH RBC QN AUTO: 30.8 PG (ref 26–34)
MCH RBC QN AUTO: 30.9 PG (ref 26–34)
MCH RBC QN AUTO: 31 PG (ref 26–34)
MCH RBC QN AUTO: 31.3 PG (ref 26–34)
MCH RBC QN AUTO: 31.6 PG (ref 26–34)
MCHC RBC AUTO-ENTMCNC: 33.8 G/DL (ref 31–37)
MCHC RBC AUTO-ENTMCNC: 33.9 G/DL (ref 31–37)
MCHC RBC AUTO-ENTMCNC: 34.3 G/DL (ref 31–37)
MCHC RBC AUTO-ENTMCNC: 34.3 G/DL (ref 31–37)
MCHC RBC AUTO-ENTMCNC: 34.5 G/DL (ref 31–37)
MCHC RBC AUTO-ENTMCNC: 34.6 G/DL (ref 31–37)
MCHC RBC AUTO-ENTMCNC: 34.9 G/DL (ref 31–37)
MCHC RBC AUTO-ENTMCNC: 35 G/DL (ref 31–37)
MCV RBC AUTO: 87.8 FL (ref 80–100)
MCV RBC AUTO: 88 FL (ref 80–100)
MCV RBC AUTO: 88.1 FL (ref 80–100)
MCV RBC AUTO: 89.4 FL (ref 80–100)
MCV RBC AUTO: 89.7 FL (ref 80–100)
MCV RBC AUTO: 91.2 FL (ref 80–100)
MCV RBC AUTO: 92 FL (ref 80–100)
MCV RBC AUTO: 92.4 FL (ref 80–100)
METHGB MFR BLD: 1 % SAT (ref 0.4–1.5)
METHGB MFR BLD: 1.1 % SAT (ref 0.4–1.5)
MONOCYTES # BLD AUTO: 0.01 X10(3) UL (ref 0.1–1)
MONOCYTES # BLD AUTO: 0.02 X10(3) UL (ref 0.1–1)
MONOCYTES # BLD AUTO: 0.08 X10(3) UL (ref 0.1–1)
MONOCYTES # BLD AUTO: 0.15 X10(3) UL (ref 0.1–1)
MONOCYTES # BLD AUTO: 0.86 X10(3) UL (ref 0.1–1)
MONOCYTES # BLD AUTO: 0.89 X10(3) UL (ref 0.1–1)
MONOCYTES # BLD: 0.03 X10(3) UL (ref 0.1–1)
MONOCYTES # BLD: 0.68 X10(3) UL (ref 0.1–1)
MONOCYTES NFR BLD AUTO: 1.6 %
MONOCYTES NFR BLD AUTO: 1.7 %
MONOCYTES NFR BLD AUTO: 4.2 %
MONOCYTES NFR BLD AUTO: 7.8 %
MONOCYTES NFR BLD AUTO: 8.8 %
MONOCYTES NFR BLD AUTO: 9.5 %
MONOCYTES NFR BLD: 16 %
MONOCYTES NFR BLD: 6 %
MORPHOLOGY: NORMAL
MORPHOLOGY: NORMAL
MRSA DNA SPEC QL NAA+PROBE: NEGATIVE
NEUTROPHILS # BLD AUTO: 0.01 X10 (3) UL (ref 1.5–7.7)
NEUTROPHILS # BLD AUTO: 0.01 X10(3) UL (ref 1.5–7.7)
NEUTROPHILS # BLD AUTO: 0.02 X10 (3) UL (ref 1.5–7.7)
NEUTROPHILS # BLD AUTO: 0.04 X10 (3) UL (ref 1.5–7.7)
NEUTROPHILS # BLD AUTO: 0.04 X10(3) UL (ref 1.5–7.7)
NEUTROPHILS # BLD AUTO: 4.84 X10 (3) UL (ref 1.5–7.7)
NEUTROPHILS # BLD AUTO: 4.84 X10(3) UL (ref 1.5–7.7)
NEUTROPHILS # BLD AUTO: 7.61 X10 (3) UL (ref 1.5–7.7)
NEUTROPHILS # BLD AUTO: 7.61 X10(3) UL (ref 1.5–7.7)
NEUTROPHILS # BLD AUTO: 8.6 X10 (3) UL (ref 1.5–7.7)
NEUTROPHILS # BLD AUTO: 8.6 X10(3) UL (ref 1.5–7.7)
NEUTROPHILS # BLD AUTO: 8.84 X10 (3) UL (ref 1.5–7.7)
NEUTROPHILS # BLD AUTO: 8.84 X10(3) UL (ref 1.5–7.7)
NEUTROPHILS # BLD AUTO: 9.53 X10 (3) UL (ref 1.5–7.7)
NEUTROPHILS NFR BLD AUTO: 16.6 %
NEUTROPHILS NFR BLD AUTO: 4.7 %
NEUTROPHILS NFR BLD AUTO: 74.9 %
NEUTROPHILS NFR BLD AUTO: 79.8 %
NEUTROPHILS NFR BLD AUTO: 94.9 %
NEUTROPHILS NFR BLD AUTO: 95.4 %
NEUTROPHILS NFR BLD: 4 %
NEUTROPHILS NFR BLD: 87 %
NEUTS BAND NFR BLD: 5 %
NEUTS HYPERSEG # BLD: 0.01 X10(3) UL (ref 1.5–7.7)
NEUTS HYPERSEG # BLD: 10.4 X10(3) UL (ref 1.5–7.7)
NITRITE UR QL STRIP.AUTO: NEGATIVE
NON GYNE INTERPRETATION: NEGATIVE
NONHDLC SERPL-MCNC: 218 MG/DL (ref ?–130)
NT-PROBNP SERPL-MCNC: 7406 PG/ML (ref ?–125)
OSMOLALITY SERPL CALC.SUM OF ELEC: 284 MOSM/KG (ref 275–295)
OSMOLALITY SERPL CALC.SUM OF ELEC: 287 MOSM/KG (ref 275–295)
OSMOLALITY SERPL CALC.SUM OF ELEC: 289 MOSM/KG (ref 275–295)
OSMOLALITY SERPL CALC.SUM OF ELEC: 293 MOSM/KG (ref 275–295)
OSMOLALITY SERPL CALC.SUM OF ELEC: 294 MOSM/KG (ref 275–295)
OSMOLALITY SERPL CALC.SUM OF ELEC: 300 MOSM/KG (ref 275–295)
OSMOLALITY SERPL CALC.SUM OF ELEC: 304 MOSM/KG (ref 275–295)
OSMOLALITY SERPL CALC.SUM OF ELEC: 305 MOSM/KG (ref 275–295)
OSMOLALITY SERPL CALC.SUM OF ELEC: 306 MOSM/KG (ref 275–295)
OSMOLALITY SERPL CALC.SUM OF ELEC: 312 MOSM/KG (ref 275–295)
OSMOLALITY SERPL CALC.SUM OF ELEC: 312 MOSM/KG (ref 275–295)
OSMOLALITY SERPL CALC.SUM OF ELEC: 313 MOSM/KG (ref 275–295)
OSMOLALITY SERPL CALC.SUM OF ELEC: 316 MOSM/KG (ref 275–295)
OSMOLALITY SERPL CALC.SUM OF ELEC: 317 MOSM/KG (ref 275–295)
OSMOLALITY SERPL CALC.SUM OF ELEC: 320 MOSM/KG (ref 275–295)
OXYHGB MFR BLDA: 88.1 % (ref 92–100)
OXYHGB MFR BLDA: 91.7 % (ref 92–100)
OXYHGB MFR BLDV: 96.4 % (ref 72–78)
P AXIS: 0 DEGREES
P-R INTERVAL: 130 MS
PCO2 BLDA: 29 MM HG (ref 35–45)
PCO2 BLDA: 39 MM HG (ref 35–45)
PCO2 BLDV: 32 MM HG (ref 38–50)
PEEP: 8 CM H2O
PEEP: 8 CM H2O
PH BLDA: 7.33 [PH] (ref 7.35–7.45)
PH BLDA: 7.38 [PH] (ref 7.35–7.45)
PH BLDV: 7.41 [PH] (ref 7.33–7.43)
PH UR STRIP.AUTO: 5.5 [PH] (ref 5–8)
PHOSPHATE SERPL-MCNC: 10.4 MG/DL (ref 2.4–5.1)
PHOSPHATE SERPL-MCNC: 10.6 MG/DL (ref 2.4–5.1)
PHOSPHATE SERPL-MCNC: 11.3 MG/DL (ref 2.4–5.1)
PHOSPHATE SERPL-MCNC: 17.4 MG/DL (ref 2.4–5.1)
PHOSPHATE SERPL-MCNC: 2.5 MG/DL (ref 2.4–5.1)
PHOSPHATE SERPL-MCNC: 3 MG/DL (ref 2.4–5.1)
PHOSPHATE SERPL-MCNC: 3.4 MG/DL (ref 2.4–5.1)
PHOSPHATE SERPL-MCNC: 3.6 MG/DL (ref 2.4–5.1)
PHOSPHATE SERPL-MCNC: 4 MG/DL (ref 2.4–5.1)
PHOSPHATE SERPL-MCNC: 4.3 MG/DL (ref 2.4–5.1)
PHOSPHATE SERPL-MCNC: 4.5 MG/DL (ref 2.4–5.1)
PHOSPHATE SERPL-MCNC: 5.2 MG/DL (ref 2.4–5.1)
PHOSPHATE SERPL-MCNC: 5.3 MG/DL (ref 2.4–5.1)
PHOSPHATE SERPL-MCNC: 7.8 MG/DL (ref 2.4–5.1)
PHOSPHATE SERPL-MCNC: 8.6 MG/DL (ref 2.4–5.1)
PHOSPHATE SERPL-MCNC: 9.3 MG/DL (ref 2.4–5.1)
PLATELET # BLD AUTO: 137 10(3)UL (ref 150–450)
PLATELET # BLD AUTO: 174 10(3)UL (ref 150–450)
PLATELET # BLD AUTO: 218 10(3)UL (ref 150–450)
PLATELET # BLD AUTO: 282 10(3)UL (ref 150–450)
PLATELET # BLD AUTO: 35 10(3)UL (ref 150–450)
PLATELET # BLD AUTO: 36 10(3)UL (ref 150–450)
PLATELET # BLD AUTO: 36 10(3)UL (ref 150–450)
PLATELET # BLD AUTO: 84 10(3)UL (ref 150–450)
PLATELET MORPHOLOGY: NORMAL
PLATELET MORPHOLOGY: NORMAL
PLATELETS.RETICULATED NFR BLD AUTO: 2 % (ref 0–7)
PLATELETS.RETICULATED NFR BLD AUTO: 2.5 % (ref 0–7)
PLATELETS.RETICULATED NFR BLD AUTO: 3.3 % (ref 0–7)
PLATELETS.RETICULATED NFR BLD AUTO: 4.7 % (ref 0–7)
PLATELETS.RETICULATED NFR BLD AUTO: 6.1 % (ref 0–7)
PO2 BLDA: 61 MM HG (ref 80–100)
PO2 BLDA: 76 MM HG (ref 80–100)
PO2 BLDV: 131 MM HG (ref 30–50)
POTASSIUM BLD-SCNC: 6.4 MMOL/L (ref 3.6–5.1)
POTASSIUM SERPL-SCNC: 3 MMOL/L (ref 3.5–5.1)
POTASSIUM SERPL-SCNC: 3.2 MMOL/L (ref 3.5–5.1)
POTASSIUM SERPL-SCNC: 3.4 MMOL/L (ref 3.5–5.1)
POTASSIUM SERPL-SCNC: 3.6 MMOL/L (ref 3.5–5.1)
POTASSIUM SERPL-SCNC: 3.8 MMOL/L (ref 3.5–5.1)
POTASSIUM SERPL-SCNC: 3.9 MMOL/L (ref 3.5–5.1)
POTASSIUM SERPL-SCNC: 4.1 MMOL/L (ref 3.5–5.1)
POTASSIUM SERPL-SCNC: 4.4 MMOL/L (ref 3.5–5.1)
POTASSIUM SERPL-SCNC: 4.7 MMOL/L (ref 3.5–5.1)
POTASSIUM SERPL-SCNC: 4.8 MMOL/L (ref 3.5–5.1)
POTASSIUM SERPL-SCNC: 5.1 MMOL/L (ref 3.5–5.1)
POTASSIUM SERPL-SCNC: 5.3 MMOL/L (ref 3.5–5.1)
POTASSIUM SERPL-SCNC: 5.8 MMOL/L (ref 3.5–5.1)
POTASSIUM SERPL-SCNC: 6 MMOL/L (ref 3.5–5.1)
POTASSIUM SERPL-SCNC: 6.9 MMOL/L (ref 3.5–5.1)
POTASSIUM SERPL-SCNC: 6.9 MMOL/L (ref 3.5–5.1)
PROCALCITONIN SERPL-MCNC: 0.68 NG/ML (ref ?–0.05)
PROT SERPL-MCNC: 4.5 G/DL (ref 5.7–8.2)
PROT SERPL-MCNC: 4.5 G/DL (ref 5.7–8.2)
PROT SERPL-MCNC: 4.8 G/DL (ref 5.7–8.2)
PROT SERPL-MCNC: 4.8 G/DL (ref 5.7–8.2)
PROT SERPL-MCNC: 5 G/DL (ref 5.7–8.2)
PROT SERPL-MCNC: 5.1 G/DL (ref 5.7–8.2)
PROT SERPL-MCNC: 5.3 G/DL (ref 5.7–8.2)
PROT SERPL-MCNC: 5.8 G/DL (ref 5.7–8.2)
PROT SERPL-MCNC: 5.9 G/DL (ref 5.7–8.2)
PROT SERPL-MCNC: 6 G/DL (ref 5.7–8.2)
PROT SERPL-MCNC: 6.1 G/DL (ref 5.7–8.2)
PROT SERPL-MCNC: 6.4 G/DL (ref 5.7–8.2)
PROTHROMBIN TIME: 14.5 SECONDS (ref 11.6–14.8)
PROTHROMBIN TIME: 17.1 SECONDS (ref 11.6–14.8)
Q-T INTERVAL: 338 MS
QRS DURATION: 78 MS
QTC CALCULATION (BEZET): 440 MS
R AXIS: -30 DEGREES
RBC # BLD AUTO: 3.99 X10(6)UL (ref 3.8–5.8)
RBC # BLD AUTO: 4 X10(6)UL (ref 3.8–5.8)
RBC # BLD AUTO: 4.07 X10(6)UL (ref 3.8–5.8)
RBC # BLD AUTO: 4.2 X10(6)UL (ref 3.8–5.8)
RBC # BLD AUTO: 4.47 X10(6)UL (ref 3.8–5.8)
RBC # BLD AUTO: 4.58 X10(6)UL (ref 3.8–5.8)
RBC # BLD AUTO: 4.77 X10(6)UL (ref 3.8–5.8)
RBC # BLD AUTO: 4.99 X10(6)UL (ref 3.8–5.8)
RBC # CSF: 0 /MM3 (ref ?–1)
SODIUM BLD-SCNC: 131 MMOL/L (ref 135–145)
SODIUM SERPL-SCNC: 131 MMOL/L (ref 136–145)
SODIUM SERPL-SCNC: 133 MMOL/L (ref 136–145)
SODIUM SERPL-SCNC: 134 MMOL/L (ref 136–145)
SODIUM SERPL-SCNC: 135 MMOL/L (ref 136–145)
SODIUM SERPL-SCNC: 136 MMOL/L (ref 136–145)
SODIUM SERPL-SCNC: 138 MMOL/L (ref 136–145)
SODIUM SERPL-SCNC: 139 MMOL/L (ref 136–145)
SODIUM SERPL-SCNC: 140 MMOL/L (ref 136–145)
SODIUM SERPL-SCNC: 140 MMOL/L (ref 136–145)
SODIUM SERPL-SCNC: 141 MMOL/L (ref 136–145)
SODIUM SERPL-SCNC: 141 MMOL/L (ref 136–145)
SODIUM SERPL-SCNC: 142 MMOL/L (ref 136–145)
SODIUM SERPL-SCNC: 143 MMOL/L (ref 136–145)
SODIUM SERPL-SCNC: 145 MMOL/L (ref 136–145)
SODIUM SERPL-SCNC: 145 MMOL/L (ref 136–145)
SP GR UR STRIP.AUTO: 1.01 (ref 1–1.03)
T AXIS: 127 DEGREES
TIDAL VOLUME: 500 ML
TIDAL VOLUME: 500 ML
TOTAL CELLS COUNTED BLD: 100
TOTAL CELLS COUNTED BLD: 25
TOTAL CELLS COUNTED CSF: 0 /MM3 (ref 0–5)
TOTAL VOLUME CSF: 9 ML
TRIGL SERPL-MCNC: 246 MG/DL (ref 30–149)
TRIGL SERPL-MCNC: 247 MG/DL (ref 30–149)
TROPONIN I SERPL HS-MCNC: 1136 NG/L (ref ?–53)
URATE SERPL-MCNC: 18.2 MG/DL (ref 3.7–9.2)
URATE SERPL-MCNC: 19.9 MG/DL (ref 3.7–9.2)
URATE SERPL-MCNC: 21.1 MG/DL (ref 3.7–9.2)
URATE SERPL-MCNC: 25.7 MG/DL (ref 3.7–9.2)
URATE SERPL-MCNC: 4.2 MG/DL (ref 3.7–9.2)
URATE SERPL-MCNC: 4.4 MG/DL (ref 3.7–9.2)
URATE SERPL-MCNC: 4.4 MG/DL (ref 3.7–9.2)
URATE SERPL-MCNC: 4.8 MG/DL (ref 3.7–9.2)
URATE SERPL-MCNC: 5.6 MG/DL (ref 3.7–9.2)
URATE SERPL-MCNC: 6.7 MG/DL (ref 3.7–9.2)
URATE SERPL-MCNC: 7 MG/DL (ref 3.7–9.2)
URATE SERPL-MCNC: 9.3 MG/DL (ref 3.7–9.2)
UROBILINOGEN UR STRIP.AUTO-MCNC: NORMAL MG/DL
VENT RATE: 22 /MIN
VENT RATE: 22 /MIN
VENTRICULAR RATE: 102 BPM
VLDLC SERPL CALC-MCNC: 50 MG/DL (ref 0–30)
WBC # BLD AUTO: 0.2 X10(3) UL (ref 4–11)
WBC # BLD AUTO: 10.2 X10(3) UL (ref 4–11)
WBC # BLD AUTO: 11.1 X10(3) UL (ref 4–11)
WBC # BLD AUTO: 11.3 X10(3) UL (ref 4–11)
WBC # BLD AUTO: 5.1 X10(3) UL (ref 4–11)
WBC # BLD AUTO: 9 X10(3) UL (ref 4–11)

## 2025-01-01 PROCEDURE — 99232 SBSQ HOSP IP/OBS MODERATE 35: CPT | Performed by: STUDENT IN AN ORGANIZED HEALTH CARE EDUCATION/TRAINING PROGRAM

## 2025-01-01 PROCEDURE — 009U3ZX DRAINAGE OF SPINAL CANAL, PERCUTANEOUS APPROACH, DIAGNOSTIC: ICD-10-PCS | Performed by: HOSPITALIST

## 2025-01-01 PROCEDURE — 99232 SBSQ HOSP IP/OBS MODERATE 35: CPT | Performed by: NURSE PRACTITIONER

## 2025-01-01 PROCEDURE — 99233 SBSQ HOSP IP/OBS HIGH 50: CPT | Performed by: INTERNAL MEDICINE

## 2025-01-01 PROCEDURE — 99223 1ST HOSP IP/OBS HIGH 75: CPT | Performed by: INTERNAL MEDICINE

## 2025-01-01 PROCEDURE — B548ZZA ULTRASONOGRAPHY OF SUPERIOR VENA CAVA, GUIDANCE: ICD-10-PCS | Performed by: HOSPITALIST

## 2025-01-01 PROCEDURE — 5A1935Z RESPIRATORY VENTILATION, LESS THAN 24 CONSECUTIVE HOURS: ICD-10-PCS | Performed by: EMERGENCY MEDICINE

## 2025-01-01 PROCEDURE — 70553 MRI BRAIN STEM W/O & W/DYE: CPT | Performed by: STUDENT IN AN ORGANIZED HEALTH CARE EDUCATION/TRAINING PROGRAM

## 2025-01-01 PROCEDURE — 5A1D70Z PERFORMANCE OF URINARY FILTRATION, INTERMITTENT, LESS THAN 6 HOURS PER DAY: ICD-10-PCS | Performed by: INTERNAL MEDICINE

## 2025-01-01 PROCEDURE — 99232 SBSQ HOSP IP/OBS MODERATE 35: CPT | Performed by: INTERNAL MEDICINE

## 2025-01-01 PROCEDURE — 99233 SBSQ HOSP IP/OBS HIGH 50: CPT

## 2025-01-01 PROCEDURE — XW0334A INTRODUCTION OF CEFEPIME-TANIBORBACTAM ANTI-INFECTIVE INTO PERIPHERAL VEIN, PERCUTANEOUS APPROACH, NEW TECHNOLOGY GROUP 10: ICD-10-PCS | Performed by: HOSPITALIST

## 2025-01-01 PROCEDURE — 31500 INSERT EMERGENCY AIRWAY: CPT | Performed by: EMERGENCY MEDICINE

## 2025-01-01 PROCEDURE — 71045 X-RAY EXAM CHEST 1 VIEW: CPT | Performed by: EMERGENCY MEDICINE

## 2025-01-01 PROCEDURE — 4A133B1 MONITORING OF ARTERIAL PRESSURE, PERIPHERAL, PERCUTANEOUS APPROACH: ICD-10-PCS | Performed by: EMERGENCY MEDICINE

## 2025-01-01 PROCEDURE — 99223 1ST HOSP IP/OBS HIGH 75: CPT | Performed by: NURSE PRACTITIONER

## 2025-01-01 PROCEDURE — 99239 HOSP IP/OBS DSCHRG MGMT >30: CPT | Performed by: HOSPITALIST

## 2025-01-01 PROCEDURE — 02HV33Z INSERTION OF INFUSION DEVICE INTO SUPERIOR VENA CAVA, PERCUTANEOUS APPROACH: ICD-10-PCS | Performed by: RADIOLOGY

## 2025-01-01 PROCEDURE — 03HY32Z INSERTION OF MONITORING DEVICE INTO UPPER ARTERY, PERCUTANEOUS APPROACH: ICD-10-PCS | Performed by: EMERGENCY MEDICINE

## 2025-01-01 PROCEDURE — 99233 SBSQ HOSP IP/OBS HIGH 50: CPT | Performed by: HOSPITALIST

## 2025-01-01 PROCEDURE — 99232 SBSQ HOSP IP/OBS MODERATE 35: CPT | Performed by: HOSPITALIST

## 2025-01-01 PROCEDURE — 4A133J1 MONITORING OF ARTERIAL PULSE, PERIPHERAL, PERCUTANEOUS APPROACH: ICD-10-PCS | Performed by: EMERGENCY MEDICINE

## 2025-01-01 PROCEDURE — 02HV33Z INSERTION OF INFUSION DEVICE INTO SUPERIOR VENA CAVA, PERCUTANEOUS APPROACH: ICD-10-PCS | Performed by: HOSPITALIST

## 2025-01-01 PROCEDURE — 5A2204Z RESTORATION OF CARDIAC RHYTHM, SINGLE: ICD-10-PCS | Performed by: EMERGENCY MEDICINE

## 2025-01-01 PROCEDURE — 93306 TTE W/DOPPLER COMPLETE: CPT | Performed by: INTERNAL MEDICINE

## 2025-01-01 PROCEDURE — 78815 PET IMAGE W/CT SKULL-THIGH: CPT | Performed by: STUDENT IN AN ORGANIZED HEALTH CARE EDUCATION/TRAINING PROGRAM

## 2025-01-01 PROCEDURE — 36620 INSERTION CATHETER ARTERY: CPT | Performed by: EMERGENCY MEDICINE

## 2025-01-01 PROCEDURE — 99291 CRITICAL CARE FIRST HOUR: CPT | Performed by: EMERGENCY MEDICINE

## 2025-01-01 PROCEDURE — 99222 1ST HOSP IP/OBS MODERATE 55: CPT | Performed by: CLINICAL NURSE SPECIALIST

## 2025-01-01 PROCEDURE — 82962 GLUCOSE BLOOD TEST: CPT

## 2025-01-01 PROCEDURE — 0BH17EZ INSERTION OF ENDOTRACHEAL AIRWAY INTO TRACHEA, VIA NATURAL OR ARTIFICIAL OPENING: ICD-10-PCS | Performed by: EMERGENCY MEDICINE

## 2025-01-01 PROCEDURE — 3E04305 INTRODUCTION OF OTHER ANTINEOPLASTIC INTO CENTRAL VEIN, PERCUTANEOUS APPROACH: ICD-10-PCS | Performed by: INTERNAL MEDICINE

## 2025-01-01 PROCEDURE — 5A12012 PERFORMANCE OF CARDIAC OUTPUT, SINGLE, MANUAL: ICD-10-PCS | Performed by: EMERGENCY MEDICINE

## 2025-01-01 PROCEDURE — 62328 DX LMBR SPI PNXR W/FLUOR/CT: CPT | Performed by: NURSE PRACTITIONER

## 2025-01-01 RX ORDER — DIPHENHYDRAMINE HYDROCHLORIDE 50 MG/ML
50 INJECTION, SOLUTION INTRAMUSCULAR; INTRAVENOUS ONCE AS NEEDED
Status: ACTIVE | OUTPATIENT
Start: 2025-01-01 | End: 2025-01-01

## 2025-01-01 RX ORDER — ALBUMIN (HUMAN) 12.5 G/50ML
25 SOLUTION INTRAVENOUS
Status: ACTIVE | OUTPATIENT
Start: 2025-01-01 | End: 2025-01-01

## 2025-01-01 RX ORDER — VANCOMYCIN HYDROCHLORIDE
15 ONCE
Status: DISCONTINUED | OUTPATIENT
Start: 2025-01-01 | End: 2025-01-01

## 2025-01-01 RX ORDER — OXYCODONE HYDROCHLORIDE 5 MG/1
5 TABLET ORAL EVERY 4 HOURS PRN
Refills: 0 | Status: DISCONTINUED | OUTPATIENT
Start: 2025-01-01 | End: 2025-01-01

## 2025-01-01 RX ORDER — GADOTERATE MEGLUMINE 376.9 MG/ML
20 INJECTION INTRAVENOUS
Status: COMPLETED | OUTPATIENT
Start: 2025-01-01 | End: 2025-01-01

## 2025-01-01 RX ORDER — POTASSIUM CHLORIDE 14.9 MG/ML
20 INJECTION INTRAVENOUS ONCE
Status: COMPLETED | OUTPATIENT
Start: 2025-01-01 | End: 2025-01-01

## 2025-01-01 RX ORDER — BUMETANIDE 0.25 MG/ML
2 INJECTION, SOLUTION INTRAMUSCULAR; INTRAVENOUS ONCE
Status: COMPLETED | OUTPATIENT
Start: 2025-01-01 | End: 2025-01-01

## 2025-01-01 RX ORDER — LIDOCAINE HYDROCHLORIDE 10 MG/ML
5 INJECTION, SOLUTION EPIDURAL; INFILTRATION; INTRACAUDAL; PERINEURAL
Status: COMPLETED | OUTPATIENT
Start: 2025-01-01 | End: 2025-01-01

## 2025-01-01 RX ORDER — PREDNISONE 50 MG/1
100 TABLET ORAL
Status: COMPLETED | OUTPATIENT
Start: 2025-01-01 | End: 2025-01-01

## 2025-01-01 RX ORDER — ONDANSETRON 2 MG/ML
4 INJECTION INTRAMUSCULAR; INTRAVENOUS EVERY 6 HOURS PRN
Status: DISCONTINUED | OUTPATIENT
Start: 2025-01-01 | End: 2025-01-01

## 2025-01-01 RX ORDER — PREDNISONE 50 MG/1
100 TABLET ORAL
Status: DISCONTINUED | OUTPATIENT
Start: 2025-01-01 | End: 2025-01-01

## 2025-01-01 RX ORDER — HYDROMORPHONE HYDROCHLORIDE 1 MG/ML
0.4 INJECTION, SOLUTION INTRAMUSCULAR; INTRAVENOUS; SUBCUTANEOUS EVERY 2 HOUR PRN
Refills: 0 | Status: DISCONTINUED | OUTPATIENT
Start: 2025-01-01 | End: 2025-01-01

## 2025-01-01 RX ORDER — ACETAMINOPHEN 160 MG/5ML
650 SOLUTION ORAL EVERY 4 HOURS PRN
Status: DISCONTINUED | OUTPATIENT
Start: 2025-01-01 | End: 2025-01-01

## 2025-01-01 RX ORDER — DIPHENHYDRAMINE HCL 25 MG
25 CAPSULE ORAL EVERY 24 HOURS
Status: DISCONTINUED | OUTPATIENT
Start: 2025-01-01 | End: 2025-01-01 | Stop reason: SDUPTHER

## 2025-01-01 RX ORDER — CHLORHEXIDINE GLUCONATE ORAL RINSE 1.2 MG/ML
15 SOLUTION DENTAL
Status: DISCONTINUED | OUTPATIENT
Start: 2025-01-01 | End: 2025-01-01

## 2025-01-01 RX ORDER — ACETAMINOPHEN 325 MG/1
650 TABLET ORAL EVERY 24 HOURS
Status: COMPLETED | OUTPATIENT
Start: 2025-01-01 | End: 2025-01-01

## 2025-01-01 RX ORDER — HEPARIN SODIUM 1000 [USP'U]/ML
1.5 INJECTION, SOLUTION INTRAVENOUS; SUBCUTANEOUS
Status: COMPLETED | OUTPATIENT
Start: 2025-01-01 | End: 2025-01-01

## 2025-01-01 RX ORDER — BISACODYL 10 MG
10 SUPPOSITORY, RECTAL RECTAL
Status: DISCONTINUED | OUTPATIENT
Start: 2025-01-01 | End: 2025-01-01

## 2025-01-01 RX ORDER — ACYCLOVIR 400 MG/1
400 TABLET ORAL 2 TIMES DAILY
Status: DISCONTINUED | OUTPATIENT
Start: 2025-01-01 | End: 2025-01-01

## 2025-01-01 RX ORDER — METHYLPREDNISOLONE SODIUM SUCCINATE 125 MG/2ML
125 INJECTION INTRAMUSCULAR; INTRAVENOUS ONCE AS NEEDED
Status: ACTIVE | OUTPATIENT
Start: 2025-01-01 | End: 2025-01-01

## 2025-01-01 RX ORDER — ACETAMINOPHEN 325 MG/1
650 TABLET ORAL ONCE AS NEEDED
Status: DISCONTINUED | OUTPATIENT
Start: 2025-01-01 | End: 2025-01-01 | Stop reason: SDUPTHER

## 2025-01-01 RX ORDER — MORPHINE SULFATE 4 MG/ML
4 INJECTION, SOLUTION INTRAMUSCULAR; INTRAVENOUS EVERY 2 HOUR PRN
Status: DISCONTINUED | OUTPATIENT
Start: 2025-01-01 | End: 2025-01-01

## 2025-01-01 RX ORDER — MORPHINE SULFATE 2 MG/ML
2 INJECTION, SOLUTION INTRAMUSCULAR; INTRAVENOUS
Status: DISCONTINUED | OUTPATIENT
Start: 2025-01-01 | End: 2025-01-01

## 2025-01-01 RX ORDER — ACYCLOVIR 200 MG/1
200 CAPSULE ORAL 2 TIMES DAILY
Status: DISCONTINUED | OUTPATIENT
Start: 2025-01-01 | End: 2025-01-01

## 2025-01-01 RX ORDER — SENNOSIDES 8.6 MG
17.2 TABLET ORAL NIGHTLY PRN
Status: DISCONTINUED | OUTPATIENT
Start: 2025-01-01 | End: 2025-01-01

## 2025-01-01 RX ORDER — PROCHLORPERAZINE EDISYLATE 5 MG/ML
10 INJECTION INTRAMUSCULAR; INTRAVENOUS EVERY 6 HOURS PRN
Status: DISCONTINUED | OUTPATIENT
Start: 2025-01-01 | End: 2025-01-01

## 2025-01-01 RX ORDER — POLYETHYLENE GLYCOL 3350 17 G/17G
17 POWDER, FOR SOLUTION ORAL DAILY PRN
Status: DISCONTINUED | OUTPATIENT
Start: 2025-01-01 | End: 2025-01-01

## 2025-01-01 RX ORDER — POTASSIUM CHLORIDE 1500 MG/1
40 TABLET, EXTENDED RELEASE ORAL ONCE
Status: DISCONTINUED | OUTPATIENT
Start: 2025-01-01 | End: 2025-01-01

## 2025-01-01 RX ORDER — DEXTROSE MONOHYDRATE AND SODIUM CHLORIDE 5; .9 G/100ML; G/100ML
INJECTION, SOLUTION INTRAVENOUS CONTINUOUS
Status: DISCONTINUED | OUTPATIENT
Start: 2025-01-01 | End: 2025-01-01

## 2025-01-01 RX ORDER — CALCIUM GLUCONATE 10 MG/ML
1 INJECTION, SOLUTION INTRAVENOUS ONCE
Status: COMPLETED | OUTPATIENT
Start: 2025-01-01 | End: 2025-01-01

## 2025-01-01 RX ORDER — LORAZEPAM 2 MG/ML
2 INJECTION INTRAMUSCULAR EVERY 30 MIN PRN
Status: DISCONTINUED | OUTPATIENT
Start: 2025-01-01 | End: 2025-01-01

## 2025-01-01 RX ORDER — HYDROCODONE BITARTRATE AND ACETAMINOPHEN 5; 325 MG/1; MG/1
1 TABLET ORAL EVERY 6 HOURS PRN
Refills: 0 | Status: DISCONTINUED | OUTPATIENT
Start: 2025-01-01 | End: 2025-01-01

## 2025-01-01 RX ORDER — ACETAMINOPHEN 325 MG/1
650 TABLET ORAL EVERY 24 HOURS
Status: DISCONTINUED | OUTPATIENT
Start: 2025-01-01 | End: 2025-01-01 | Stop reason: SDUPTHER

## 2025-01-01 RX ORDER — DEXTROSE MONOHYDRATE 25 G/50ML
25 INJECTION, SOLUTION INTRAVENOUS
Status: ACTIVE | OUTPATIENT
Start: 2025-01-01 | End: 2025-01-01

## 2025-01-01 RX ORDER — SIMETHICONE 125 MG
125 TABLET,CHEWABLE ORAL 4 TIMES DAILY PRN
Status: DISCONTINUED | OUTPATIENT
Start: 2025-01-01 | End: 2025-01-01

## 2025-01-01 RX ORDER — SODIUM PHOSPHATE, DIBASIC AND SODIUM PHOSPHATE, MONOBASIC 7; 19 G/230ML; G/230ML
1 ENEMA RECTAL ONCE AS NEEDED
Status: DISCONTINUED | OUTPATIENT
Start: 2025-01-01 | End: 2025-01-01

## 2025-01-01 RX ORDER — ACETAMINOPHEN 10 MG/ML
1000 INJECTION, SOLUTION INTRAVENOUS EVERY 6 HOURS PRN
Status: DISCONTINUED | OUTPATIENT
Start: 2025-01-01 | End: 2025-01-01

## 2025-01-01 RX ORDER — HYDROMORPHONE HYDROCHLORIDE 1 MG/ML
0.2 INJECTION, SOLUTION INTRAMUSCULAR; INTRAVENOUS; SUBCUTANEOUS EVERY 2 HOUR PRN
Refills: 0 | Status: DISCONTINUED | OUTPATIENT
Start: 2025-01-01 | End: 2025-01-01

## 2025-01-01 RX ORDER — SODIUM CHLORIDE 9 MG/ML
INJECTION, SOLUTION INTRAVENOUS CONTINUOUS
Status: DISCONTINUED | OUTPATIENT
Start: 2025-01-01 | End: 2025-01-01

## 2025-01-01 RX ORDER — DIPHENHYDRAMINE HYDROCHLORIDE 50 MG/ML
50 INJECTION, SOLUTION INTRAMUSCULAR; INTRAVENOUS ONCE AS NEEDED
Status: DISCONTINUED | OUTPATIENT
Start: 2025-01-01 | End: 2025-01-01 | Stop reason: SDUPTHER

## 2025-01-01 RX ORDER — SEVELAMER CARBONATE 800 MG/1
800 TABLET, FILM COATED ORAL
Status: DISCONTINUED | OUTPATIENT
Start: 2025-01-01 | End: 2025-01-01

## 2025-01-01 RX ORDER — LORAZEPAM 2 MG/ML
1 INJECTION INTRAMUSCULAR EVERY 30 MIN PRN
Status: DISCONTINUED | OUTPATIENT
Start: 2025-01-01 | End: 2025-01-01

## 2025-01-01 RX ORDER — ACETAMINOPHEN 10 MG/ML
1000 INJECTION, SOLUTION INTRAVENOUS EVERY 8 HOURS PRN
Status: DISCONTINUED | OUTPATIENT
Start: 2025-01-01 | End: 2025-01-01

## 2025-01-01 RX ORDER — OXYCODONE HYDROCHLORIDE 5 MG/1
5 TABLET ORAL EVERY 4 HOURS PRN
Status: DISCONTINUED | OUTPATIENT
Start: 2025-01-01 | End: 2025-01-01

## 2025-01-01 RX ORDER — SENNOSIDES 8.8 MG/5ML
5 LIQUID ORAL 2 TIMES DAILY PRN
Status: DISCONTINUED | OUTPATIENT
Start: 2025-01-01 | End: 2025-01-01

## 2025-01-01 RX ORDER — DIPHENHYDRAMINE HCL 25 MG
25 CAPSULE ORAL EVERY 24 HOURS
Status: COMPLETED | OUTPATIENT
Start: 2025-01-01 | End: 2025-01-01

## 2025-01-01 RX ORDER — PREDNISONE 50 MG/1
100 TABLET ORAL ONCE
Status: DISCONTINUED | OUTPATIENT
Start: 2025-01-01 | End: 2025-01-01

## 2025-01-01 RX ORDER — FUROSEMIDE 10 MG/ML
20 INJECTION INTRAMUSCULAR; INTRAVENOUS ONCE
Status: COMPLETED | OUTPATIENT
Start: 2025-01-01 | End: 2025-01-01

## 2025-01-01 RX ORDER — ALLOPURINOL 300 MG/1
300 TABLET ORAL DAILY
Status: DISCONTINUED | OUTPATIENT
Start: 2025-01-01 | End: 2025-01-01

## 2025-01-01 RX ORDER — HEPARIN SODIUM 5000 [USP'U]/ML
INJECTION, SOLUTION INTRAVENOUS; SUBCUTANEOUS
Status: COMPLETED
Start: 2025-01-01 | End: 2025-01-01

## 2025-01-01 RX ORDER — ENOXAPARIN SODIUM 100 MG/ML
40 INJECTION SUBCUTANEOUS DAILY
Status: DISCONTINUED | OUTPATIENT
Start: 2025-01-01 | End: 2025-01-01

## 2025-01-01 RX ORDER — MORPHINE SULFATE 2 MG/ML
2 INJECTION, SOLUTION INTRAMUSCULAR; INTRAVENOUS EVERY 2 HOUR PRN
Status: DISCONTINUED | OUTPATIENT
Start: 2025-01-01 | End: 2025-01-01

## 2025-01-01 RX ORDER — SENNOSIDES 8.8 MG/5ML
10 LIQUID ORAL 2 TIMES DAILY
Status: DISCONTINUED | OUTPATIENT
Start: 2025-01-01 | End: 2025-01-01

## 2025-01-01 RX ORDER — ROCURONIUM BROMIDE 10 MG/ML
INJECTION, SOLUTION INTRAVENOUS
Status: COMPLETED
Start: 2025-01-01 | End: 2025-01-01

## 2025-01-01 RX ORDER — OXYCODONE HCL 5 MG/5 ML
5 SOLUTION, ORAL ORAL EVERY 4 HOURS PRN
Status: DISCONTINUED | OUTPATIENT
Start: 2025-01-01 | End: 2025-01-01

## 2025-01-01 RX ORDER — LIDOCAINE HYDROCHLORIDE 10 MG/ML
INJECTION, SOLUTION INFILTRATION; PERINEURAL
Status: COMPLETED
Start: 2025-01-01 | End: 2025-01-01

## 2025-01-01 RX ORDER — ALLOPURINOL 100 MG/1
300 TABLET ORAL 2 TIMES DAILY
Status: DISCONTINUED | OUTPATIENT
Start: 2025-01-01 | End: 2025-01-01

## 2025-01-01 RX ORDER — POTASSIUM CHLORIDE 1500 MG/1
20 TABLET, EXTENDED RELEASE ORAL ONCE
Status: DISCONTINUED | OUTPATIENT
Start: 2025-01-01 | End: 2025-01-01

## 2025-01-01 RX ORDER — SCOPOLAMINE 1 MG/3D
1 PATCH, EXTENDED RELEASE TRANSDERMAL
Status: DISCONTINUED | OUTPATIENT
Start: 2025-01-01 | End: 2025-01-01

## 2025-01-01 RX ORDER — DEXTROSE MONOHYDRATE 25 G/50ML
INJECTION, SOLUTION INTRAVENOUS ONCE
Status: COMPLETED | OUTPATIENT
Start: 2025-01-01 | End: 2025-01-01

## 2025-01-01 RX ORDER — MINERAL OIL AND PETROLATUM 150; 830 MG/G; MG/G
1 OINTMENT OPHTHALMIC NIGHTLY
Status: DISCONTINUED | OUTPATIENT
Start: 2025-01-01 | End: 2025-01-01

## 2025-01-01 RX ORDER — LORAZEPAM 2 MG/ML
0.5 INJECTION INTRAMUSCULAR
Status: DISCONTINUED | OUTPATIENT
Start: 2025-01-01 | End: 2025-01-01

## 2025-01-01 RX ORDER — ACETAMINOPHEN 650 MG/1
650 SUPPOSITORY RECTAL EVERY 4 HOURS PRN
Status: DISCONTINUED | OUTPATIENT
Start: 2025-01-01 | End: 2025-01-01

## 2025-01-01 RX ORDER — MORPHINE SULFATE 2 MG/ML
1 INJECTION, SOLUTION INTRAMUSCULAR; INTRAVENOUS EVERY 2 HOUR PRN
Status: DISCONTINUED | OUTPATIENT
Start: 2025-01-01 | End: 2025-01-01

## 2025-01-01 RX ORDER — HYDROMORPHONE HYDROCHLORIDE 1 MG/ML
0.1 INJECTION, SOLUTION INTRAMUSCULAR; INTRAVENOUS; SUBCUTANEOUS EVERY 2 HOUR PRN
Refills: 0 | Status: DISCONTINUED | OUTPATIENT
Start: 2025-01-01 | End: 2025-01-01

## 2025-01-01 RX ORDER — ACETAMINOPHEN 325 MG/1
650 TABLET ORAL ONCE AS NEEDED
Status: ACTIVE | OUTPATIENT
Start: 2025-01-01 | End: 2025-01-01

## 2025-01-01 RX ORDER — GLYCOPYRROLATE 0.2 MG/ML
0.2 INJECTION, SOLUTION INTRAMUSCULAR; INTRAVENOUS
Status: DISCONTINUED | OUTPATIENT
Start: 2025-01-01 | End: 2025-01-01

## 2025-01-01 RX ORDER — DOXORUBICIN HYDROCHLORIDE 2 MG/ML
50 INJECTION, SOLUTION INTRAVENOUS EVERY 24 HOURS
Status: COMPLETED | OUTPATIENT
Start: 2025-01-01 | End: 2025-01-01

## 2025-03-04 ENCOUNTER — PATIENT MESSAGE (OUTPATIENT)
Dept: INTERNAL MEDICINE CLINIC | Facility: CLINIC | Age: 70
End: 2025-03-04

## 2025-03-04 DIAGNOSIS — Z13.220 SCREENING FOR LIPID DISORDERS: ICD-10-CM

## 2025-03-04 DIAGNOSIS — Z12.5 SCREENING PSA (PROSTATE SPECIFIC ANTIGEN): ICD-10-CM

## 2025-03-04 DIAGNOSIS — I10 ESSENTIAL HYPERTENSION: Primary | ICD-10-CM

## 2025-03-04 DIAGNOSIS — Z13.29 SCREENING FOR THYROID DISORDER: ICD-10-CM

## 2025-03-04 DIAGNOSIS — R97.20 ELEVATED PSA: ICD-10-CM

## 2025-03-10 NOTE — TELEPHONE ENCOUNTER
Patient requesting lab orders prior to scheduling an appointment     Last labs completed 2022    LOV 9/1/22

## 2025-03-18 ENCOUNTER — PATIENT MESSAGE (OUTPATIENT)
Dept: INTERNAL MEDICINE CLINIC | Facility: CLINIC | Age: 70
End: 2025-03-18

## 2025-03-18 DIAGNOSIS — R97.20 ELEVATED PSA: Primary | ICD-10-CM

## 2025-03-18 LAB
ABSOLUTE BASOPHILS: 69 CELLS/UL (ref 0–200)
ABSOLUTE EOSINOPHILS: 239 CELLS/UL (ref 15–500)
ABSOLUTE LYMPHOCYTES: 1278 CELLS/UL (ref 850–3900)
ABSOLUTE MONOCYTES: 701 CELLS/UL (ref 200–950)
ABSOLUTE NEUTROPHILS: 5413 CELLS/UL (ref 1500–7800)
ALBUMIN/GLOBULIN RATIO: 1.6 (CALC) (ref 1–2.5)
ALBUMIN: 4.5 G/DL (ref 3.6–5.1)
ALKALINE PHOSPHATASE: 77 U/L (ref 35–144)
ALT: 19 U/L (ref 9–46)
APPEARANCE: CLEAR
AST: 20 U/L (ref 10–35)
BASOPHILS: 0.9 %
BILIRUBIN, TOTAL: 1.9 MG/DL (ref 0.2–1.2)
BILIRUBIN: NEGATIVE
BUN: 10 MG/DL (ref 7–25)
CALCIUM: 9.6 MG/DL (ref 8.6–10.3)
CARBON DIOXIDE: 29 MMOL/L (ref 20–32)
CHLORIDE: 95 MMOL/L (ref 98–110)
CHOL/HDLC RATIO: 5.9 (CALC)
CHOLESTEROL, TOTAL: 234 MG/DL
CREATININE: 0.9 MG/DL (ref 0.7–1.35)
EGFR: 92 ML/MIN/1.73M2
EOSINOPHILS: 3.1 %
GLOBULIN: 2.8 G/DL (CALC) (ref 1.9–3.7)
GLUCOSE: 89 MG/DL (ref 65–99)
GLUCOSE: NEGATIVE
HDL CHOLESTEROL: 40 MG/DL
HEMATOCRIT: 50.7 % (ref 38.5–50)
HEMOGLOBIN: 17.4 G/DL (ref 13.2–17.1)
KETONES: NEGATIVE
LDL-CHOLESTEROL: 168 MG/DL (CALC)
LEUKOCYTE ESTERASE: NEGATIVE
LYMPHOCYTES: 16.6 %
MCH: 31.7 PG (ref 27–33)
MCHC: 34.3 G/DL (ref 32–36)
MCV: 92.3 FL (ref 80–100)
MONOCYTES: 9.1 %
MPV: 11 FL (ref 7.5–12.5)
NEUTROPHILS: 70.3 %
NITRITE: NEGATIVE
NON-HDL CHOLESTEROL: 194 MG/DL (CALC)
OCCULT BLOOD: NEGATIVE
PH: 7.5 (ref 5–8)
PLATELET COUNT: 194 THOUSAND/UL (ref 140–400)
POTASSIUM: 4.3 MMOL/L (ref 3.5–5.3)
PROTEIN, TOTAL: 7.3 G/DL (ref 6.1–8.1)
PROTEIN: NEGATIVE
PSA, TOTAL: 12.35 NG/ML
RDW: 11.8 % (ref 11–15)
RED BLOOD CELL COUNT: 5.49 MILLION/UL (ref 4.2–5.8)
SODIUM: 134 MMOL/L (ref 135–146)
SPECIFIC GRAVITY: 1.01 (ref 1–1.03)
TRIGLYCERIDES: 127 MG/DL
TSH W/REFLEX TO FT4: 1.07 MIU/L (ref 0.4–4.5)
WHITE BLOOD CELL COUNT: 7.7 THOUSAND/UL (ref 3.8–10.8)

## 2025-03-18 NOTE — TELEPHONE ENCOUNTER
PSR please schedule patient for appointment when he returns our call. Thank you      Shayan Harris MD  3/18/2025  7:20 AM CDT       PSA significantly elevated. Needs visit scheduled, please use SDA.       Laura Tang RN  3/18/2025 10:58 AM CDT Back to [facility]      [phone]   for pt (Ok per HIPAA) to call back at the office for test results.

## 2025-03-20 ENCOUNTER — PATIENT MESSAGE (OUTPATIENT)
Dept: INTERNAL MEDICINE CLINIC | Facility: CLINIC | Age: 70
End: 2025-03-20

## 2025-03-20 NOTE — TELEPHONE ENCOUNTER
Urology referral placed. Please aide in scheduling if able. Attempted to call patient and LVM for callback

## 2025-03-21 NOTE — TELEPHONE ENCOUNTER
Referral updated for Dr. Aldrich per patient preference. Both Dr. Floyd and Humberto are taking patients.

## 2025-03-22 ENCOUNTER — TELEPHONE (OUTPATIENT)
Dept: INTERNAL MEDICINE CLINIC | Facility: CLINIC | Age: 70
End: 2025-03-22

## 2025-03-22 NOTE — TELEPHONE ENCOUNTER
Patient returned call. He did not pursue urology evaluation after his previous PSA elevation despite my recommended per our previous conversation in Sept 2022 and has not followed with me. He states he did have an outside PSA drawn in late 2022 and states the level was 4.6. Discussed urgency of seeing urology for evaluation of his elevated PSA which he is understanding. His soonest current scheduled appt with urology is on 4/29 with Dr. Floyd. He has a busy schedule but is able to work around this if needed for a sooner appt. Discussed that I'll have my team work on getting him in with urology ASAP and will be hearing back from us on Monday AM.

## 2025-03-24 NOTE — TELEPHONE ENCOUNTER
Future Appointments   Date Time Provider Department Center   3/27/2025  2:00 PM Mariel Carnes, JULIEN [id number]   4/22/2025  1:00 PM Shayan Harris MD [facility]     WALT Patton,  patient will be seen this week by Urology.

## 2025-03-24 NOTE — TELEPHONE ENCOUNTER
Called Urology office and explained situation that Dr. Harris would like patient to be seen sooner than the 4/29/25 appointment he has scheduled due to the elevated PSR.  Explained issue with patient's scheduling limitations and that he prefers to see Dr. Floyd.  Advised he is only available this morning by phone. They state they do have a sooner appointment with the APRN.  This would help to get the appropriate work up started sooner and patient could then follow up with his preferred provider.  They will call patient and explain and attempt to schedule patient for a sooner appointment.

## 2025-03-27 ENCOUNTER — OFFICE VISIT (OUTPATIENT)
Dept: SURGERY | Facility: CLINIC | Age: 70
End: 2025-03-27
Payer: MEDICARE

## 2025-03-27 DIAGNOSIS — R97.20 ELEVATED PSA: Primary | ICD-10-CM

## 2025-03-27 DIAGNOSIS — R35.1 NOCTURIA: ICD-10-CM

## 2025-03-27 DIAGNOSIS — R35.0 URINARY FREQUENCY: ICD-10-CM

## 2025-03-27 DIAGNOSIS — I10 PRIMARY HYPERTENSION: ICD-10-CM

## 2025-03-27 LAB
APPEARANCE: CLEAR
GLUCOSE (URINE DIPSTICK): NEGATIVE MG/DL
MULTISTIX LOT#: ABNORMAL NUMERIC
NITRITE, URINE: NEGATIVE
OCCULT BLOOD: NEGATIVE
PH, URINE: 5.5 (ref 4.5–8)
PROTEIN (URINE DIPSTICK): 30 MG/DL
SPECIFIC GRAVITY: >=1.03 (ref 1–1.03)
UROBILINOGEN,SEMI-QN: 0.2 MG/DL (ref 0–1.9)

## 2025-03-27 PROCEDURE — 51798 US URINE CAPACITY MEASURE: CPT

## 2025-03-27 PROCEDURE — 1159F MED LIST DOCD IN RCRD: CPT

## 2025-03-27 PROCEDURE — 81003 URINALYSIS AUTO W/O SCOPE: CPT

## 2025-03-27 PROCEDURE — 99204 OFFICE O/P NEW MOD 45 MIN: CPT

## 2025-03-27 PROCEDURE — 1160F RVW MEDS BY RX/DR IN RCRD: CPT

## 2025-03-27 NOTE — PROGRESS NOTES
HPI:     Lencho Torres is a 69 year old male with hx of non-Hodgkins lymphoma and HTN, who  presents from Dr. Harris' office for elevated PSA.    PCP: Dr. Shayan Harris    Here today for elevated PSAs. Pt reports that he is not too alarmed by the 12.35 PSA because he has had an enlarged prostate for decades.    States that prior to getting PSA checked on 03/17, has been having new/worsening symptoms.  Onset: ~1 month ago - increased urgency/freq, decrease in force of stream    Denies: gross hematuria, dysuria, recent fevers or chills  FOS: during daytime, stream \"less than medium\", even slower at night, sometimes just dribbling out. Takes a little but of time/effort to get stream started. At baseline, most of the time stream was strong; no issues with starting stream; continuous   DF: q2hrs (at baseline q3hrs). Sometimes not much output but feels like his bladder emptied.  NOC: x3 (prev only x2/night)  Fluids: 4-5x 16 oz bottles of water/daily 2 cups coffee  Bowels: regular, 2x/day, not needing to strain too much to pass stools    Hx of UTI/prostatitis/stones: NONE    Fhx  malignancy: NONE  Fhx stones: NONE    UA today neg blood and nitrites, +trace leuks.  PVR 5mL  AUA SS: 18/35: 3/5, 4/5, 1/5, 2/5, 4/5, 1/5, and 3/5. QOL 6/6 terrible  Works as a /health ; prev a HS basketball    Labs:  (03/17/25) PSA: 12.35  (09/01/22) PSA: 8.22 -- pt did not believe that this result was accurate \"because of what I had done the day before\", repeated test at [facility] the following month, 4.6  (02/22/16) PSA: 1.0  No results found for: \"PSA\"  No results found for: \"PERCENTPSA\"  Lab Results   Component Value Date    PSAS 8.22 (H) 09/01/2022   No results found for: \"PSAULTRA\"     HISTORY:  Past Medical History:    Hx of lymphoma, non-Hodgkins    CUtaneous, b cell (venogopal)    Unspecified essential hypertension      Past Surgical History:   Procedure Laterality Date    Other surgical history  '12    [place] tooth     Other surgical history      right ear, lasik      Family History   Problem Relation Age of Onset    Hypertension Brother         half brother    Lipids Brother     Stroke Mother       Social History:   Social History     Socioeconomic History    Marital status: Single   Occupational History    Occupation:    Tobacco Use    Smoking status: Former     Current packs/day: 0.00     Average packs/day: 1 pack/day for 22.0 years (22.0 ttl pk-yrs)     Types: Cigarettes     Start date: 5/3/1963     Quit date: 5/3/1985     Years since quittin.9    Smokeless tobacco: Never   Substance and Sexual Activity    Alcohol use: Yes     Alcohol/week: 0.0 standard drinks of alcohol     Comment: occ, cage 16    Drug use: No     Social Drivers of Health     Food Insecurity: Low Risk  (2024)    Received from [facility]     Within the past 12 months, the food you bought just didn't last and you didn't have money to get more.: 3     Within the past 12 months, you worried that your food would run out before you got money to buy more.: 3   Transportation Needs: Not At Risk (2024)    Received from [facility] Transportation Needs     In the past 12 months, has lack of reliable transportation kept you from medical appointments, meetings, work or from getting things needed for daily living?: No   Stress: No Stress Concern Present (2024)    Received from [facility]    [employer] - Occupational Stress Questionnaire     Feeling of Stress : Not at all   Housing Stability: Not At Risk (2024)    Received from [facility] Housing     What is your living situation today?: I have a steady place to live     Think about the place you live. Do you have problems with any of the following?: None of the above        Medications (Active prior to today's visit):  Current Outpatient Medications   Medication Sig Dispense Refill    lisinopril 10 MG  Oral Tab Take 1 tablet (10 mg total) by mouth daily. 30 tablet 0       Allergies:  Allergies[1]    ROS:     A comprehensive 10 point review of systems was completed.  Pertinent positives and negatives noted in the the HPI.    PHYSICAL EXAM:     GENERAL APPEARANCE: well, developed, well nourished, in no acute distress  NEUROLOGIC: nonfocal, alert and oriented  HEAD: normocephalic, atraumatic  EYES: sclera non-icteric  EARS: hearing intact  ORAL CAVITY: mucosa moist  NECK/THYROID: no obvious goiter or masses  LUNGS: nonlabored breathing  ABDOMEN: soft, no obvious masses or tenderness  DANII: pt declined d/t concerns about inaccuracy/lack of necessity of exam  SKIN: no obvious rashes     ASSESSMENT/PLAN:   Diagnoses and all orders for this visit:    Primary hypertension  -     URINALYSIS, AUTO, W/O SCOPE    - Urine dip +trace leuks but lack of dysuria --  discussed w/pt, not testing urine for culture at this time.     Elevated PSA  Weak stream   Nocturia  - given hx of x2 elevated PSA, will order MRI prostate; discussed w/pt what potential steps would be depending on results.   - continue to stay hydrated with at least 48-64 oz water daily  - can try behavioral modifications including stopping fluids 3 hrs before bed, limiting intake of bladder irritants, etc. To see if frequency/nocturia improves  - No concerns for retention at this time, can defer conversation of alpha-blockers til next visit if urinary symptoms not improving    Follow-up: pt to call back and schedule an OV to review MRI results once MRI has been scheduled.     JULIEN Arias  Department of Urology  [facility]          [1]   Allergies  Allergen Reactions    Ibuprofen WHEEZING

## 2025-03-27 NOTE — PATIENT INSTRUCTIONS
Dietary Irritants    What you can do to help relieve your symptoms:    The purpose of this handout is to provide information that can help you discover what you are ingesting or doing that may be contributing to your recurrent irritative voiding symptoms and what steps you can take to relieve your discomfort.     Frequency, urgency, and pain on urination are symptoms of inflammation or irritation. These symptoms can be caused by bacterial infections or substances in the urine causing irritation to the lining of the bladder or urethra. Bacterial infections can be treated with antibiotics. But irritation of the bladder or urethra can results from other cause that will probably not respond to antibiotics.    What to do at the first sign of bladder or urethral discomfort:    The basic treatment for irritable bladder symptoms, whether induced by bacterial or nonbacterial irritants, is hydration. At the first sign of discomfort, start drinking 16 oz of water mixed with 1 teaspoon of baking soda. Then drink 8 oz of plain water every 20 minutes for the next 2-3 hours.     Repeat drinking 16 oz of water with baking soda in 3-4 hours. (Baking soda contains sodium and can cause fluid retention. If you have a history of high blood pressure, congestive heart failure, or renal disease, you should not use more than twice in 24 hours.)    You can take acetaminophen to relieve the pain (two extra strength or three regular strength tablets). Bladder analgesics such as phenazopyridine (Pyridium) may help and will not alter the results of a urine culture should the symptoms not be significantly relieved by diluting the urine and flushing out the bladder.     A warm bath to help muscles of the pelvic floor relax will also help lessen discomfort.     Before starting any antibiotic, a urine culture must be taken. If the conservative measures mentioned above are not helping, call the office to arrange for a urine culture. If the specimen  is contaminated with vaginal cells and bacteria and you are severely symptomatic, then a catheterized specimen should be obtained directly from your bladder to determine precisely whether bacterial infection is the cause of your symptoms.     Dietary irritants to the urinary tract to avoid:    Certain food can contribute to urinary frequency, urgency, and discomfort. If bladder symptoms are related to dietary factors, strict adherence to a diet that eliminated the food should bring significant relief in 10 days. Once you are feeling better, you can begin to add foods back into your diet one at a time. If the symptoms return, you will be able to identify the irritant. As you add foods back to your diet it is very important that you drink significant amounts of water. These foods are acidic and are considered irritants to the bladder. They should be avoided.    Alcoholic beverages  Apples and apple juice  Cantaloupe  Carbonated beverages  Chili and spicy foods  Citrus fruit  Chocolate  Coffee (including decaf)  Cranberries and juice  Grapes  Guava  Peaches  Pineapple  Plums  Strawberries  Sugar*  Tea  Tomatoes  Vit B Complex  Vinegar  *Some women report that sugar flares their symptoms.    Low acid fruit substitutions include apricots, papaya, pears, and watermelon. Coffee drinkers can drink Kava or other low-acid instant drinks. Tea drinks can substitute non-citrus herbal and sun brewed teas. Calcium carbonate co-buffered with calcium ascorbate can be substituted for Vitamin C.

## 2025-04-08 ENCOUNTER — NURSE TRIAGE (OUTPATIENT)
Dept: INTERNAL MEDICINE CLINIC | Facility: CLINIC | Age: 70
End: 2025-04-08

## 2025-04-08 ENCOUNTER — PATIENT MESSAGE (OUTPATIENT)
Dept: INTERNAL MEDICINE CLINIC | Facility: CLINIC | Age: 70
End: 2025-04-08

## 2025-04-08 ENCOUNTER — OFFICE VISIT (OUTPATIENT)
Dept: INTERNAL MEDICINE CLINIC | Facility: CLINIC | Age: 70
End: 2025-04-08
Payer: MEDICARE

## 2025-04-08 VITALS
DIASTOLIC BLOOD PRESSURE: 74 MMHG | TEMPERATURE: 98 F | HEART RATE: 113 BPM | BODY MASS INDEX: 23.89 KG/M2 | WEIGHT: 176.38 LBS | RESPIRATION RATE: 18 BRPM | SYSTOLIC BLOOD PRESSURE: 110 MMHG | HEIGHT: 72 IN | OXYGEN SATURATION: 96 %

## 2025-04-08 DIAGNOSIS — R63.0 LOSS OF APPETITE: ICD-10-CM

## 2025-04-08 DIAGNOSIS — R10.11 RUQ PAIN: Primary | ICD-10-CM

## 2025-04-08 DIAGNOSIS — R63.4 WEIGHT LOSS: ICD-10-CM

## 2025-04-08 PROCEDURE — 99213 OFFICE O/P EST LOW 20 MIN: CPT

## 2025-04-08 PROCEDURE — 3074F SYST BP LT 130 MM HG: CPT

## 2025-04-08 PROCEDURE — 3008F BODY MASS INDEX DOCD: CPT

## 2025-04-08 PROCEDURE — 3078F DIAST BP <80 MM HG: CPT

## 2025-04-08 PROCEDURE — 1160F RVW MEDS BY RX/DR IN RCRD: CPT

## 2025-04-08 PROCEDURE — 1159F MED LIST DOCD IN RCRD: CPT

## 2025-04-08 NOTE — TELEPHONE ENCOUNTER
Action Requested: Summary for Provider     []  Critical Lab, Recommendations Needed  [] Need Additional Advice  [x]   FYI    []   Need Orders  [] Need Medications Sent to Pharmacy  []  Other     SUMMARY: Pt scheduled for OV evaluation for open appointment today.  RN strongly encouraged ED, pt declined d/t cost.      Reason for call: No chief complaint on file.  Onset: 1 month    Notes:  - Enlarged prostate, PSA elevated.  - MRI prostate 4/25 from urologist.   - GI appt ([facility]) May 21.   - Asking for order from Dr. Harris for direct admit to hospital so he can have tests completed sooner.  Informed pt appropriate disposition is via ED evaluation.  Pt strongly declined d/t cost.  - Requested OV with Dr. Harris, RN informed earliest opening is 4/11, pt accepted available OV with mid-level provider.        Reason for Disposition   MILD TO MODERATE constant pain lasting > 2 hours    Protocols used: Abdominal Pain - Male-A-OH

## 2025-04-08 NOTE — TELEPHONE ENCOUNTER
Left voicemail and [facility] for patient to go to the ED or call 911 if his symptoms require immediate care or to call and schedule an appointment with Dr Harris

## 2025-04-08 NOTE — PROGRESS NOTES
Lencho Torres is a 69 year old male.   Chief Complaint   Patient presents with    Abdominal Pain     EJ Rm 16a- Pt is here for abd pain since before March 17 that has became increasingly worsening     HPI:         The following individual(s) verbally consented to be recorded using ambient AI listening technology and understand that they can each withdraw their consent to this listening technology at any point by asking the clinician to turn off or pause the recording:    Patient name: Lencho Torres         History of Present Illness  Lencho Torres is a 69 year old male who presents with abdominal pain.   He has been experiencing abdominal pain since before March 17, 2025. The pain is located in the right upper quadrant, associated with eating, and described as an ache in the liver and gallbladder area. It radiates to the right shoulder blade and sometimes down the arm to the wrist, and also travels down the right rib cage. The pain is excruciating, worsens when lying down, and is slightly alleviated by standing or walking. He is unable to sleep more than 45 minutes at a time due to the pain.    He has a significant decrease in appetite and has lost 10 pounds. His diet is minimal, consisting of a protein smoothie and a small bowl of cereal per day. No vomiting or diarrhea, but he experiences nausea and fullness. He is constantly thirsty, drinking seven to eight bottles of water daily.    He has a history of elevated PSA levels, which were three times higher than normal as of March 17, 2025. He has been referred to urology and has an MRI of the prostate scheduled for April 25, 2025. He has also been referred to gastroenterology, with an appointment May 21, 2025.    He is unable to work his part-time jobs as a  and health  due to his symptoms. He experiences chills and sweating but has not been able to check his temperature due to a broken thermometer. He rates his current pain as an 8 out of 10.      Allergies:  Allergies[1]   Current Meds:  Current Outpatient Medications   Medication Sig Dispense Refill    lisinopril 10 MG Oral Tab Take 1 tablet (10 mg total) by mouth daily. 30 tablet 0        PMH:     Past Medical History:    Hx of lymphoma, non-Hodgkins    CUtaneous, b cell (venogopal)    Unspecified essential hypertension       ROS:   Review of Systems   Constitutional:  Positive for activity change, appetite change, chills, fatigue and unexpected weight change.   Respiratory: Negative.     Cardiovascular: Negative.    Gastrointestinal:  Positive for abdominal distention and abdominal pain (8/10 RUQ). Negative for blood in stool, constipation, diarrhea, nausea and vomiting.            PHYSICAL EXAM:    /74   Pulse 113   Temp 97.7 °F (36.5 °C) (Temporal)   Resp 18   Ht 6' (1.829 m)   Wt 176 lb 6.4 oz (80 kg)   SpO2 96%   BMI 23.92 kg/m²   Physical Exam  Constitutional:       Appearance: Normal appearance. He is not ill-appearing or toxic-appearing.   Pulmonary:      Effort: Pulmonary effort is normal.   Abdominal:      General: There is distension.      Comments: Patient deferred further exam and reports will go to ED.    Neurological:      Mental Status: He is alert and oriented to person, place, and time.       ASSESSMENT/ PLAN:     Assessment & Plan  Abdominal Pain  Right upper quadrant pain postprandial, radiating to right shoulder, possible biliary or hepatobiliary disorder.  - Order blood work: liver enzymes, lipase, amylase, repeat CMP.  - Order abdominal ultrasound for biliary system, liver, pancreas.  Patient declined work up and reports he will go to ED. He reports coming today to get orders for direct admit. Informed patient our office does not have rights to direct admit patients.     Elevated PSA  PSA level three times normal, pending prostate MRI  - Proceed with prostate MRI on April 25th.  - Follow up with urology for further evaluation.       Health Maintenance Due   Topic Date  Due    Zoster Vaccines (1 of 2) Never done    Annual Well Visit  Never done    COVID-19 Vaccine (8 - 2024-25 season) 03/10/2025     Pt indicates understanding and agrees to the plan.     No follow-ups on file.    Addis Martinez, APRN          [1]   Allergies  Allergen Reactions    Ibuprofen WHEEZING

## 2025-04-09 ENCOUNTER — APPOINTMENT (OUTPATIENT)
Dept: CT IMAGING | Facility: HOSPITAL | Age: 70
End: 2025-04-09
Attending: EMERGENCY MEDICINE
Payer: MEDICARE

## 2025-04-09 ENCOUNTER — APPOINTMENT (OUTPATIENT)
Dept: GENERAL RADIOLOGY | Facility: HOSPITAL | Age: 70
End: 2025-04-09
Payer: MEDICARE

## 2025-04-09 ENCOUNTER — HOSPITAL ENCOUNTER (INPATIENT)
Facility: HOSPITAL | Age: 70
LOS: 1 days | Discharge: HOME | End: 2025-04-10
Attending: EMERGENCY MEDICINE | Admitting: STUDENT IN AN ORGANIZED HEALTH CARE EDUCATION/TRAINING PROGRAM
Payer: MEDICARE

## 2025-04-09 ENCOUNTER — HOSPITAL ENCOUNTER (INPATIENT)
Facility: HOSPITAL | Age: 70
LOS: 1 days | Discharge: HOME HEALTH | End: 2025-04-10
Attending: EMERGENCY MEDICINE | Admitting: STUDENT IN AN ORGANIZED HEALTH CARE EDUCATION/TRAINING PROGRAM
Payer: MEDICARE

## 2025-04-09 DIAGNOSIS — R10.84 GENERALIZED ABDOMINAL PAIN: ICD-10-CM

## 2025-04-09 DIAGNOSIS — R59.0 MEDIASTINAL LYMPHADENOPATHY: ICD-10-CM

## 2025-04-09 DIAGNOSIS — E83.52 HYPERCALCEMIA: ICD-10-CM

## 2025-04-09 DIAGNOSIS — C78.6 PERITONEAL CARCINOMATOSIS (HCC): Primary | ICD-10-CM

## 2025-04-09 DIAGNOSIS — E87.1 HYPONATREMIA: ICD-10-CM

## 2025-04-09 DIAGNOSIS — R10.84 ABDOMINAL PAIN, GENERALIZED: ICD-10-CM

## 2025-04-09 DIAGNOSIS — R79.89 ELEVATED TROPONIN: ICD-10-CM

## 2025-04-09 PROBLEM — E87.20 METABOLIC ACIDOSIS: Status: ACTIVE | Noted: 2025-04-09

## 2025-04-09 PROBLEM — N17.9 AKI (ACUTE KIDNEY INJURY): Status: ACTIVE | Noted: 2025-01-01

## 2025-04-09 PROBLEM — C85.90 NON-HODGKIN LYMPHOMA (HCC): Status: ACTIVE | Noted: 2025-01-01

## 2025-04-09 PROBLEM — C85.90 NON-HODGKIN LYMPHOMA (HCC): Status: ACTIVE | Noted: 2025-04-09

## 2025-04-09 PROBLEM — G89.3 CANCER ASSOCIATED PAIN: Status: ACTIVE | Noted: 2025-04-09

## 2025-04-09 PROBLEM — G89.3 CANCER ASSOCIATED PAIN: Status: ACTIVE | Noted: 2025-01-01

## 2025-04-09 PROBLEM — N17.9 AKI (ACUTE KIDNEY INJURY): Status: ACTIVE | Noted: 2025-04-09

## 2025-04-09 PROBLEM — E87.20 METABOLIC ACIDOSIS: Status: ACTIVE | Noted: 2025-01-01

## 2025-04-09 LAB
AFP-TM SERPL-MCNC: <2.2 NG/ML (ref ?–8)
ALBUMIN SERPL-MCNC: 4.4 G/DL (ref 3.2–4.8)
ALBUMIN/GLOB SERPL: 1.9 {RATIO} (ref 1–2)
ALP LIVER SERPL-CCNC: 73 U/L (ref 45–117)
ALT SERPL-CCNC: 24 U/L (ref 10–49)
ANION GAP SERPL CALC-SCNC: 20 MMOL/L (ref 0–18)
APTT PPP: 22.6 SECONDS (ref 23–36)
AST SERPL-CCNC: 45 U/L (ref ?–34)
ATRIAL RATE: 95 BPM
BASOPHILS # BLD AUTO: 0.14 X10(3) UL (ref 0–0.2)
BASOPHILS NFR BLD AUTO: 1.5 %
BILIRUB SERPL-MCNC: 1.1 MG/DL (ref 0.2–1.1)
BILIRUB UR QL STRIP.AUTO: NEGATIVE
BUN BLD-MCNC: 28 MG/DL (ref 9–23)
CALCIUM BLD-MCNC: 11.5 MG/DL (ref 8.7–10.6)
CANCER AG19-9 SERPL-ACNC: 16.9 U/ML (ref ?–35)
CEA SERPL-MCNC: 2.5 NG/ML (ref ?–5)
CHLORIDE SERPL-SCNC: 91 MMOL/L (ref 98–112)
CHOLEST SERPL-MCNC: 220 MG/DL (ref ?–200)
CLARITY UR REFRACT.AUTO: CLEAR
CO2 SERPL-SCNC: 20 MMOL/L (ref 21–32)
CREAT BLD-MCNC: 1.54 MG/DL (ref 0.7–1.3)
EGFRCR SERPLBLD CKD-EPI 2021: 49 ML/MIN/1.73M2 (ref 60–?)
EOSINOPHIL # BLD AUTO: 0.09 X10(3) UL (ref 0–0.7)
EOSINOPHIL NFR BLD AUTO: 1 %
ERYTHROCYTE [DISTWIDTH] IN BLOOD BY AUTOMATED COUNT: 11.6 %
GLOBULIN PLAS-MCNC: 2.3 G/DL (ref 2–3.5)
GLUCOSE BLD-MCNC: 73 MG/DL (ref 70–99)
GLUCOSE UR STRIP.AUTO-MCNC: NORMAL MG/DL
HCT VFR BLD AUTO: 45.7 % (ref 39–53)
HDLC SERPL-MCNC: 30 MG/DL (ref 40–59)
HGB BLD-MCNC: 16 G/DL (ref 13–17.5)
IMM GRANULOCYTES # BLD AUTO: 0.09 X10(3) UL (ref 0–1)
IMM GRANULOCYTES NFR BLD: 1 %
INR BLD: 1.12 (ref 0.8–1.2)
KETONES UR STRIP.AUTO-MCNC: 20 MG/DL
LDH SERPL L TO P-CCNC: 1470 U/L (ref 120–246)
LDLC SERPL CALC-MCNC: 137 MG/DL (ref ?–100)
LEUKOCYTE ESTERASE UR QL STRIP.AUTO: NEGATIVE
LIPASE SERPL-CCNC: 45 U/L (ref 12–53)
LYMPHOCYTES # BLD AUTO: 0.88 X10(3) UL (ref 1–4)
LYMPHOCYTES NFR BLD AUTO: 9.7 %
MCH RBC QN AUTO: 31.2 PG (ref 26–34)
MCHC RBC AUTO-ENTMCNC: 35 G/DL (ref 31–37)
MCV RBC AUTO: 89.1 FL (ref 80–100)
MONOCYTES # BLD AUTO: 0.87 X10(3) UL (ref 0.1–1)
MONOCYTES NFR BLD AUTO: 9.6 %
NEUTROPHILS # BLD AUTO: 6.97 X10 (3) UL (ref 1.5–7.7)
NEUTROPHILS # BLD AUTO: 6.97 X10(3) UL (ref 1.5–7.7)
NEUTROPHILS NFR BLD AUTO: 77.2 %
NITRITE UR QL STRIP.AUTO: NEGATIVE
NONHDLC SERPL-MCNC: 190 MG/DL (ref ?–130)
NT-PROBNP SERPL-MCNC: 907 PG/ML (ref ?–125)
OSMOLALITY SERPL CALC.SUM OF ELEC: 276 MOSM/KG (ref 275–295)
P AXIS: 7 DEGREES
P-R INTERVAL: 124 MS
PH UR STRIP.AUTO: 5 [PH] (ref 5–8)
PLATELET # BLD AUTO: 360 10(3)UL (ref 150–450)
POTASSIUM SERPL-SCNC: 4.5 MMOL/L (ref 3.5–5.1)
PROT SERPL-MCNC: 6.7 G/DL (ref 5.7–8.2)
PROTHROMBIN TIME: 14.5 SECONDS (ref 11.6–14.8)
PSA SERPL-MCNC: 11.86 NG/ML (ref ?–4)
Q-T INTERVAL: 336 MS
QRS DURATION: 86 MS
QTC CALCULATION (BEZET): 422 MS
R AXIS: -22 DEGREES
RBC # BLD AUTO: 5.13 X10(6)UL (ref 3.8–5.8)
RBC UR QL AUTO: NEGATIVE
SODIUM SERPL-SCNC: 131 MMOL/L (ref 136–145)
SP GR UR STRIP.AUTO: >1.03 (ref 1–1.03)
T AXIS: 39 DEGREES
TRIGL SERPL-MCNC: 292 MG/DL (ref 30–149)
TROPONIN I SERPL HS-MCNC: 56 NG/L (ref ?–53)
TROPONIN I SERPL HS-MCNC: 80 NG/L (ref ?–53)
UROBILINOGEN UR STRIP.AUTO-MCNC: NORMAL MG/DL
VENTRICULAR RATE: 95 BPM
VLDLC SERPL CALC-MCNC: 55 MG/DL (ref 0–30)
WBC # BLD AUTO: 9 X10(3) UL (ref 4–11)

## 2025-04-09 PROCEDURE — 71275 CT ANGIOGRAPHY CHEST: CPT | Performed by: EMERGENCY MEDICINE

## 2025-04-09 PROCEDURE — 99223 1ST HOSP IP/OBS HIGH 75: CPT | Performed by: STUDENT IN AN ORGANIZED HEALTH CARE EDUCATION/TRAINING PROGRAM

## 2025-04-09 PROCEDURE — 74177 CT ABD & PELVIS W/CONTRAST: CPT | Performed by: EMERGENCY MEDICINE

## 2025-04-09 PROCEDURE — 71045 X-RAY EXAM CHEST 1 VIEW: CPT | Performed by: EMERGENCY MEDICINE

## 2025-04-09 RX ORDER — SODIUM CHLORIDE, SODIUM LACTATE, POTASSIUM CHLORIDE, CALCIUM CHLORIDE 600; 310; 30; 20 MG/100ML; MG/100ML; MG/100ML; MG/100ML
INJECTION, SOLUTION INTRAVENOUS CONTINUOUS
Status: DISCONTINUED | OUTPATIENT
Start: 2025-04-09 | End: 2025-04-10

## 2025-04-09 RX ORDER — METOCLOPRAMIDE HYDROCHLORIDE 5 MG/ML
5 INJECTION INTRAMUSCULAR; INTRAVENOUS EVERY 8 HOURS PRN
Status: DISCONTINUED | OUTPATIENT
Start: 2025-04-09 | End: 2025-04-10

## 2025-04-09 RX ORDER — TRAZODONE HYDROCHLORIDE 50 MG/1
50 TABLET ORAL NIGHTLY PRN
Status: DISCONTINUED | OUTPATIENT
Start: 2025-04-09 | End: 2025-04-10

## 2025-04-09 RX ORDER — HYDROCODONE BITARTRATE AND ACETAMINOPHEN 5; 325 MG/1; MG/1
2 TABLET ORAL EVERY 4 HOURS PRN
Status: DISCONTINUED | OUTPATIENT
Start: 2025-04-09 | End: 2025-04-10

## 2025-04-09 RX ORDER — HYDROCODONE BITARTRATE AND ACETAMINOPHEN 5; 325 MG/1; MG/1
1 TABLET ORAL EVERY 4 HOURS PRN
Status: DISCONTINUED | OUTPATIENT
Start: 2025-04-09 | End: 2025-04-10

## 2025-04-09 RX ORDER — SODIUM CHLORIDE 9 MG/ML
INJECTION, SOLUTION INTRAVENOUS CONTINUOUS
Status: ACTIVE | OUTPATIENT
Start: 2025-04-09 | End: 2025-04-09

## 2025-04-09 RX ORDER — BISACODYL 10 MG
10 SUPPOSITORY, RECTAL RECTAL
Status: DISCONTINUED | OUTPATIENT
Start: 2025-04-09 | End: 2025-04-10

## 2025-04-09 RX ORDER — HYDROMORPHONE HYDROCHLORIDE 1 MG/ML
0.8 INJECTION, SOLUTION INTRAMUSCULAR; INTRAVENOUS; SUBCUTANEOUS EVERY 2 HOUR PRN
Status: DISCONTINUED | OUTPATIENT
Start: 2025-04-09 | End: 2025-04-10

## 2025-04-09 RX ORDER — SODIUM CHLORIDE 9 MG/ML
125 INJECTION, SOLUTION INTRAVENOUS CONTINUOUS
Status: DISCONTINUED | OUTPATIENT
Start: 2025-04-09 | End: 2025-04-10

## 2025-04-09 RX ORDER — ONDANSETRON 2 MG/ML
4 INJECTION INTRAMUSCULAR; INTRAVENOUS EVERY 6 HOURS PRN
Status: DISCONTINUED | OUTPATIENT
Start: 2025-04-09 | End: 2025-04-10

## 2025-04-09 RX ORDER — ECHINACEA PURPUREA EXTRACT 125 MG
1 TABLET ORAL
Status: DISCONTINUED | OUTPATIENT
Start: 2025-04-09 | End: 2025-04-10

## 2025-04-09 RX ORDER — POLYETHYLENE GLYCOL 3350 17 G/17G
17 POWDER, FOR SOLUTION ORAL DAILY PRN
Status: DISCONTINUED | OUTPATIENT
Start: 2025-04-09 | End: 2025-04-10

## 2025-04-09 RX ORDER — HYDROMORPHONE HYDROCHLORIDE 1 MG/ML
0.2 INJECTION, SOLUTION INTRAMUSCULAR; INTRAVENOUS; SUBCUTANEOUS EVERY 2 HOUR PRN
Status: DISCONTINUED | OUTPATIENT
Start: 2025-04-09 | End: 2025-04-10

## 2025-04-09 RX ORDER — ACETAMINOPHEN 325 MG/1
650 TABLET ORAL EVERY 4 HOURS PRN
Status: DISCONTINUED | OUTPATIENT
Start: 2025-04-09 | End: 2025-04-10

## 2025-04-09 RX ORDER — BENZONATATE 200 MG/1
200 CAPSULE ORAL 3 TIMES DAILY PRN
Status: DISCONTINUED | OUTPATIENT
Start: 2025-04-09 | End: 2025-04-10

## 2025-04-09 RX ORDER — SENNOSIDES 8.6 MG
17.2 TABLET ORAL NIGHTLY PRN
Status: DISCONTINUED | OUTPATIENT
Start: 2025-04-09 | End: 2025-04-10

## 2025-04-09 RX ORDER — HYDROMORPHONE HYDROCHLORIDE 1 MG/ML
0.4 INJECTION, SOLUTION INTRAMUSCULAR; INTRAVENOUS; SUBCUTANEOUS EVERY 2 HOUR PRN
Status: DISCONTINUED | OUTPATIENT
Start: 2025-04-09 | End: 2025-04-10

## 2025-04-09 RX ORDER — ENOXAPARIN SODIUM 100 MG/ML
40 INJECTION SUBCUTANEOUS DAILY
Status: DISCONTINUED | OUTPATIENT
Start: 2025-04-10 | End: 2025-04-10

## 2025-04-09 NOTE — ED QUICK NOTES
Orders for admission, patient is aware of plan and ready to go upstairs. Any questions, please call ED RN Nico at extension [phone]     Patient Covid vaccination status: Fully vaccinated     COVID Test Ordered in ED: None    COVID Suspicion at Admission: N/A    Running Infusions: Medication Infusions[1]     Mental Status/LOC at time of transport: Alert    Other pertinent information:     Floor blood collections/lab orders still to be collected  Urine still to be collected    CIWA score: N/A   NIH score:  N/A               [1]    sodium chloride 125 mL/hr (04/09/25 1604)

## 2025-04-09 NOTE — ED INITIAL ASSESSMENT (HPI)
PT states that he was directed to come to the ED by urology due to several symptoms. PT states that he began to experience abdominal pain, bloated, pain that travel to the upper back, urinary retention and very small stools and chills in mid March of 2025. PT states that he has been following up with different specialists,  PSA level elevated a few weeks ago,  prostate procedure schedule for the end of April, colonoscopy schedule for May. PT states that the pain is so severe that he can not sleep, symptoms getting worse. Difficult to breat sometimes, experiences worse back pain when taking deep breaths

## 2025-04-09 NOTE — H&P
[facility]     Lencho Torres Patient Status:  Emergency    1955 MRN [id number]   Location [facility] EMERGENCY DEPARTMENT Attending Mak Mcallister,    Hosp Day # 0 PCP Shayan Harris MD     Chief Complaint: Abdominal pain    History of Present Illness: Lencho Torres is a 69 year old male with PMHX HTN, lymphoma who presents for abdominal pain.     Patient states that he went to his PCP for routine screening on , got elevated PSA level and was advised to follow-up with urology.  States that since  he has been having gradual onset of right upper quadrant abdominal pain, abdominal fullness and early satiety, weight loss, night sweats and day sweats.  He notes that he cannot sleep at night more than 45 minutes at a time due to bilateral shoulder pain radiating down the arm.  Does have a history of non-Hodgkin's lymphoma, lost to follow-up after remission in .  Currently denies any associated chest pain, shortness of breath.    Past Medical History:Past Medical History[1]     Past Surgical History: Past Surgical History[2]    Social History:  reports that he quit smoking about 39 years ago. His smoking use included cigarettes. He started smoking about 61 years ago. He has a 22 pack-year smoking history. He has never used smokeless tobacco. He reports current alcohol use. He reports that he does not use drugs.    Family History: Family History[3]    Allergies: Allergies[4]    Medications:  Medications Ordered Prior to Encounter[5]    Review of Systems:   A comprehensive 14 point review of systems was completed.    Pertinent positives and negatives noted in the HPI.    Physical Exam:    BP 99/70   Pulse 100   Temp 97.9 °F (36.6 °C) (Oral)   Resp 20   Ht 6' (1.829 m)   Wt 176 lb (79.8 kg)   SpO2 94%   BMI 23.87 kg/m²   General: No acute distress. Alert and oriented x 3.  HEENT: Normocephalic atraumatic. Moist mucous membranes. EOM-I. PERRLA. Anicteric.  Neck:  No lymphadenopathy. No JVD. No carotid bruits.  Respiratory: Clear to auscultation bilaterally. No wheezes. No rhonchi.  Cardiovascular: S1, S2. Regular rate and rhythm. No murmurs, rubs or gallops. Equal pulses.   Chest and Back: No tenderness or deformity.  Abdomen: Soft, mild tenderness palpation right upper quadrant, mildly distended.  Positive bowel sounds. No rebound, guarding or organomegaly.  Neurologic: No focal neurological deficits. CNII-XII grossly intact.  Musculoskeletal: Moves all extremities.  Extremities: No edema or cyanosis.  Integument: No rashes or lesions.   Psychiatric: Appropriate mood and affect.    Diagnostic Data:      Labs:  Recent Labs   Lab 04/09/25  1314   WBC 9.0   HGB 16.0   MCV 89.1   .0   INR 1.12       Recent Labs   Lab 04/09/25  1314   GLU 73   BUN 28*   CREATSERUM 1.54*   CA 11.5*   ALB 4.4   *   K 4.5   CL 91*   CO2 20.0*   ALKPHO 73   AST 45*   ALT 24   BILT 1.1   TP 6.7       Estimated Creatinine Clearance: 49.7 mL/min (A) (based on SCr of 1.54 mg/dL (H)).    Recent Labs   Lab 04/09/25  1314   PTP 14.5   INR 1.12       No results for input(s): \"TROP\", \"CK\" in the last 168 hours.    Imaging: Imaging data reviewed in [facility].  CTA CHEST + CT ABD (W) + CT PEL (W) (CPT=71275/72911)  Result Date: 4/9/2025  CONCLUSION:  Findings concerning for extensive metastatic disease with probable peritoneal carcinomatosis present as described above.  There is evidence of metastatic disease within the chest with probable mediastinal lymphadenopathy present.  Nodular opacity at the left lung base may represent atelectasis with pneumonia or metastatic disease not entirely excluded.  There is no pulmonary embolism to the first subsegmental arterial level.  Critical results were discussed with Dr. Mcallister by Dr. Scott at approximately 1545 on 4/9/25.  Read back was performed.    LOCATION:  [place] [id number]   Dictated by (CST): Trav Scott MD on 4/09/2025 at 3:28 PM     Finalized by (CST): Trav Scott  MD on 4/09/2025 at 3:45 PM       XR CHEST AP PORTABLE  (CPT=71045)  Result Date: 4/9/2025  CONCLUSION:  See above   LOCATION:  [id number]      Dictated by (CST): Maximilian Payton MD on 4/09/2025 at 2:04 PM     Finalized by (CST): Maximilian Payton MD on 4/09/2025 at 2:05 PM           ASSESSMENT / PLAN:     # Abdominal pain  # New peritoneal carcinomatosis  # Hx Follicular non-hodgkin's lymphoma    - Pain control with PO/IV meds     - CTA C/A/P with no PE, does show findings c/f extensive metastatic disease with probable peritoneal carcinomatosis with large 6.1cm x 6.2cm mesenteric mass   - suspect related to known NHL; also has elevated PSA PTA   - Defer additional imaging per Oncology consult    # Hyponatremia - IVF, trend chem  # Metabolic acidosis - IVF, trend chem  # Hypercalcemia - IVF  # LISSETT - IVF, trend chem    # NSTEMI, likely type II - trend trops  # Elevated BNP    - TTE ordered    # CAD   # HTN - holding home meds, resume as able       Code Status: Not on file    Plan of care discussed with patient, ED physician    Arben Yao MD  4/9/2025      Supplementary Documentation:      MDM : Patient's ER labs, imaging reviewed.  AM labs ordered.  ER management discussed with ED physician, decision made for patient to be admitted to the hospital for further medical management.                  [1]   Past Medical History:   Hx of lymphoma, non-Hodgkins    CUtaneous, b cell (venogopal)    Unspecified essential hypertension   [2]   Past Surgical History:  Procedure Laterality Date    Other surgical history  '12    [place] tooth    Other surgical history      right ear, lasik   [3]   Family History  Problem Relation Age of Onset    Hypertension Brother         half brother    Lipids Brother     Stroke Mother    [4]   Allergies  Allergen Reactions    Ibuprofen WHEEZING   [5]   No current facility-administered medications on file prior to encounter.     Current Outpatient Medications on File Prior to Encounter    Medication Sig Dispense Refill    lisinopril 10 MG Oral Tab Take 1 tablet (10 mg total) by mouth daily. 30 tablet 0

## 2025-04-09 NOTE — ED PROVIDER NOTES
Patient Seen in: [facility] Emergency Department      History     Chief Complaint   Patient presents with    Abdomen/Flank Pain    Shoulder Pain    Difficulty Breathing     Stated Complaint: PA sent pt here for bloating, rUQ pain. press on belly button it hurts. cant ea*    Subjective:   HPI      This is a very pleasant 69-year-old male who presents emergency room for evaluation of abdominal pain, patient states symptoms started a few months ago, symptoms have been slowly worsening, states been having abdominal pain, is bloated, states his appetite has been decreased and he has losing weight.  Patient did see urology and had markedly elevated PSA, MRI was recommended which she is currently awaiting his appointment.  Patient also scheduled to have colonoscopy in May.  He reports fatigue, denies chest pain but does feel dyspnea at times.  Denies tearing type chest or abdominal pain.  Denies any fevers or chills.  Denies melena or hematochezia.  Denies dysuria urgency or frequency.    Objective:     Past Medical History:    Hx of lymphoma, non-Hodgkins    CUtaneous, b cell (venogopal)    Unspecified essential hypertension              Past Surgical History:   Procedure Laterality Date    Other surgical history  '12    [place] tooth    Other surgical history Right     lasik                Social History     Socioeconomic History    Marital status: Single   Occupational History    Occupation:    Tobacco Use    Smoking status: Former     Current packs/day: 0.00     Average packs/day: 1 pack/day for 22.0 years (22.0 ttl pk-yrs)     Types: Cigarettes     Start date: 5/3/1963     Quit date: 5/3/1985     Years since quittin.9    Smokeless tobacco: Never   Vaping Use    Vaping status: Never Used   Substance and Sexual Activity    Alcohol use: Yes     Alcohol/week: 0.0 standard drinks of alcohol     Comment: occ, cage 16    Drug use: No     Social Drivers of Health     Food Insecurity: No Food  Insecurity (4/9/2025)    NCSS - Food Insecurity     Worried About Running Out of Food in the Last Year: No     Ran Out of Food in the Last Year: No   Transportation Needs: No Transportation Needs (4/9/2025)    NCSS - Transportation     Lack of Transportation: No   Housing Stability: Not At Risk (4/9/2025)    NCSS - Housing/Utilities     Has Housing: Yes     Worried About Losing Housing: No     Unable to Get Utilities: No                  Physical Exam     ED Triage Vitals   BP 04/09/25 1303 120/80   Pulse 04/09/25 1303 106   Resp 04/09/25 1303 18   Temp 04/09/25 1306 97.9 °F (36.6 °C)   Temp src 04/09/25 1306 Oral   SpO2 04/09/25 1303 98 %   O2 Device 04/09/25 1253 None (Room air)       Current Vitals:   Vital Signs  BP: 93/67  Pulse: 103  Resp: 18  Temp: 98.2 °F (36.8 °C)  Temp src: Oral  MAP (mmHg): 74    Oxygen Therapy  SpO2: 96 %  O2 Device: None (Room air)  Pulse Oximetry Type: Continuous  Oximetry Probe Site Changed: No  Pulse Ox Probe Location: Left hand        Physical Exam  GENERAL: Patient is awake, alert, in no acute distress.  HEENT:  no scleral icterus.  Mucous membranes are moderately dry.    NECK: Neck is supple, there is no nuchal rigidity.    HEART: Regular rate and rhythm, no murmurs.  LUNGS: Clear to auscultation bilaterally.  No Rales, no rhonchi, no wheezing, no stridor.  ABDOMEN: Soft, nondistended, upper abdominal tender, bowel sounds are present, no rebound, no rigidity, no guarding.no pulsatile masses. No CVA tenderness  EXTREMITIES: No peripheral edema, no calf tenderness  ED Course     Labs Reviewed   COMP METABOLIC PANEL (14) - Abnormal; Notable for the following components:       Result Value    Sodium 131 (*)     Chloride 91 (*)     CO2 20.0 (*)     Anion Gap 20 (*)     BUN 28 (*)     Creatinine 1.54 (*)     Calcium, Total 11.5 (*)     eGFR-Cr 49 (*)     AST 45 (*)     All other components within normal limits   CBC WITH DIFFERENTIAL WITH PLATELET - Abnormal; Notable for the following  components:    Lymphocyte Absolute 0.88 (*)     All other components within normal limits   TROPONIN I HIGH SENSITIVITY - Abnormal; Notable for the following components:    Troponin I (High Sensitivity) 56 (*)     All other components within normal limits   PRO BETA NATRIURETIC PEPTIDE - Abnormal; Notable for the following components:    Pro-Beta Natriuretic Peptide 907 (*)     All other components within normal limits   PTT, ACTIVATED - Abnormal; Notable for the following components:    PTT 22.6 (*)     All other components within normal limits   URINALYSIS WITH CULTURE REFLEX - Abnormal; Notable for the following components:    Spec Gravity >1.030 (*)     Ketones Urine 20 (*)     Protein Urine Trace (*)     RBC Urine 3-5 (*)     All other components within normal limits   LIPID PANEL - Abnormal; Notable for the following components:    Cholesterol, Total 220 (*)     HDL Cholesterol 30 (*)     Triglycerides 292 (*)     LDL Cholesterol 137 (*)     VLDL 55 (*)     Non HDL Chol 190 (*)     All other components within normal limits   PSA TOTAL, DIAGNOSTIC - Abnormal; Notable for the following components:    Total PSA  11.86 (*)     All other components within normal limits   LDH - Abnormal; Notable for the following components:    LDH 1,470 (*)     All other components within normal limits   TROPONIN I HIGH SENSITIVITY - Abnormal; Notable for the following components:    Troponin I (High Sensitivity) 80 (*)     All other components within normal limits   LIPASE - Normal   PROTHROMBIN TIME (PT) - Normal   CEA - Normal   AFP, TUMOR MARKER, SERUM - Normal   CARBOHYDRATE ANTIGEN 19-9   TROPONIN I HIGH SENSITIVITY   TROPONIN I HIGH SENSITIVITY   RAINBOW DRAW BLUE   RAINBOW DRAW GOLD     EKG    Rate, intervals and axes as noted on EKG Report.  Rate: 95  Rhythm: Sinus Rhythm  Reading: Normal sinus rhythm, no ST elevation                       MDM        Differential diagnosis before testing includes but not limited to  pneumonia, pulmonary embolism, malignancy, electrolyte abnormality, anemia, which is a medical condition that poses a threat to life/function    Radiographic images  I personally reviewed the radiographs and my individual interpretation shows chest x-ray no pneumothorax  I also reviewed the official reports that showed chest x-ray: Mild bibasilar linear opacities, which likely reflect atelectasis/scarring.  Correlate clinically for superimposed infection/inflammation.  No focal consolidation.  No pleural effusion.  No pneumothorax.  No pulmonary edema.  The   cardiomediastinal silhouette is within normal limits.  No acute osseous finding     CTA chest: Findings concerning for extensive metastatic disease with probable peritoneal carcinomatosis present as described above.  There is evidence of metastatic disease within the chest with probable mediastinal lymphadenopathy present.      Nodular opacity at the left lung base may represent atelectasis with pneumonia or metastatic disease not entirely excluded.      There is no pulmonary embolism to the first subsegmental arterial level.     Discussion of management (consult/physicians, social work, pharmacy,ect) Dr. Yao hospitalist, Dr. Johansen oncology    Course of Events during Emergency Room Visit include IV established, patient placed on cardiac monitor and pulse ox.  Patient given IV fluid hydration.  CBC was unremarkable, troponin was elevated 56, lipase 45.  Chemistry sodium 131 potassium 4.5 bicarb 20 BUN 28 creatinine 1.54 glucose 73.  Urinalysis negative nitrate leukocyte esterase.  Chest x-ray and CT performed, CT concerning for extensive metastatic disease with probable peritoneal carcinomatosis.  Discussed with oncology and hospitalist.  Discussed all results with the patient, will admit for further evaluation and treatment.    Shared decision making was utilized           Disposition:    Admission  I have discussed with the patient the results of test,  differential diagnosis, and treatment plan. They expressed clear understanding of these instructions and agrees to the plan provided.       Note to patient: The 21st Century Cures Act makes medical notes like these available to patients in the interest of transparency. However, this is a medical document intended as peer to peer communication. It is written in medical language and may contain abbreviations or verbiage that are unfamiliar. It may appear blunt or direct. Medical documents are intended to carry relevant information, facts as evident, and the clinical opinion of the practitioner.             Admission disposition: 4/9/2025  4:12 PM           Medical Decision Making      Disposition and Plan     Clinical Impression:  1. Peritoneal carcinomatosis (HCC)    2. Mediastinal lymphadenopathy    3. Hyponatremia    4. Hypercalcemia    5. Elevated troponin    6. Abdominal pain, generalized         Disposition:  Admit  4/9/2025  4:12 pm    Follow-up:  No follow-up provider specified.        Medications Prescribed:  Current Discharge Medication List              Supplementary Documentation:         Hospital Problems       Present on Admission  Date Reviewed: 4/8/2025          ICD-10-CM Noted POA    * (Principal) Peritoneal carcinomatosis (HCC) C78.6 4/9/2025 Unknown    Abdominal pain, generalized R10.84 4/9/2025 Unknown    LISSETT (acute kidney injury) N17.9 4/9/2025 Unknown    Cancer associated pain G89.3 4/9/2025 Unknown    Elevated troponin R79.89 4/9/2025 Unknown    Hypercalcemia E83.52 4/9/2025 Unknown    Hyponatremia E87.1 4/9/2025 Unknown    Mediastinal lymphadenopathy R59.0 4/9/2025 Unknown    Metabolic acidosis E87.20 4/9/2025 Unknown    Non-Hodgkin lymphoma (HCC) C85.90 4/9/2025 Unknown

## 2025-04-10 ENCOUNTER — APPOINTMENT (OUTPATIENT)
Dept: CT IMAGING | Facility: HOSPITAL | Age: 70
End: 2025-04-10
Attending: INTERNAL MEDICINE
Payer: MEDICARE

## 2025-04-10 ENCOUNTER — APPOINTMENT (OUTPATIENT)
Dept: CV DIAGNOSTICS | Facility: HOSPITAL | Age: 70
End: 2025-04-10
Attending: STUDENT IN AN ORGANIZED HEALTH CARE EDUCATION/TRAINING PROGRAM
Payer: MEDICARE

## 2025-04-10 VITALS
DIASTOLIC BLOOD PRESSURE: 78 MMHG | OXYGEN SATURATION: 95 % | HEART RATE: 107 BPM | HEIGHT: 72 IN | RESPIRATION RATE: 17 BRPM | WEIGHT: 178.63 LBS | SYSTOLIC BLOOD PRESSURE: 130 MMHG | TEMPERATURE: 98 F | BODY MASS INDEX: 24.2 KG/M2

## 2025-04-10 PROBLEM — R10.84 GENERALIZED ABDOMINAL PAIN: Status: ACTIVE | Noted: 2025-04-10

## 2025-04-10 PROBLEM — R10.84 GENERALIZED ABDOMINAL PAIN: Status: ACTIVE | Noted: 2025-01-01

## 2025-04-10 PROBLEM — R79.89 TROPONIN LEVEL ELEVATED: Status: ACTIVE | Noted: 2025-01-01

## 2025-04-10 PROBLEM — R79.89 TROPONIN LEVEL ELEVATED: Status: ACTIVE | Noted: 2025-04-09

## 2025-04-10 LAB
ALBUMIN SERPL-MCNC: 3.9 G/DL (ref 3.2–4.8)
ALBUMIN/GLOB SERPL: 1.9 {RATIO} (ref 1–2)
ALP LIVER SERPL-CCNC: 66 U/L (ref 45–117)
ALT SERPL-CCNC: 22 U/L (ref 10–49)
ANION GAP SERPL CALC-SCNC: 12 MMOL/L (ref 0–18)
AST SERPL-CCNC: 42 U/L (ref ?–34)
ATRIAL RATE: 89 BPM
BASOPHILS # BLD AUTO: 0.19 X10(3) UL (ref 0–0.2)
BASOPHILS NFR BLD AUTO: 2 %
BILIRUB SERPL-MCNC: 0.7 MG/DL (ref 0.2–1.1)
BUN BLD-MCNC: 26 MG/DL (ref 9–23)
CALCIUM BLD-MCNC: 10.9 MG/DL (ref 8.7–10.6)
CHLORIDE SERPL-SCNC: 94 MMOL/L (ref 98–112)
CO2 SERPL-SCNC: 26 MMOL/L (ref 21–32)
CREAT BLD-MCNC: 1.31 MG/DL (ref 0.7–1.3)
EGFRCR SERPLBLD CKD-EPI 2021: 59 ML/MIN/1.73M2 (ref 60–?)
EOSINOPHIL # BLD AUTO: 0.28 X10(3) UL (ref 0–0.7)
EOSINOPHIL NFR BLD AUTO: 2.9 %
ERYTHROCYTE [DISTWIDTH] IN BLOOD BY AUTOMATED COUNT: 11.9 %
GLOBULIN PLAS-MCNC: 2.1 G/DL (ref 2–3.5)
GLUCOSE BLD-MCNC: 89 MG/DL (ref 70–99)
HCT VFR BLD AUTO: 41.8 % (ref 39–53)
HGB BLD-MCNC: 14.6 G/DL (ref 13–17.5)
IMM GRANULOCYTES # BLD AUTO: 0.16 X10(3) UL (ref 0–1)
IMM GRANULOCYTES NFR BLD: 1.7 %
INR BLD: 1.17 (ref 0.8–1.2)
LYMPHOCYTES # BLD AUTO: 1.03 X10(3) UL (ref 1–4)
LYMPHOCYTES NFR BLD AUTO: 10.7 %
MAGNESIUM SERPL-MCNC: 2 MG/DL (ref 1.6–2.6)
MCH RBC QN AUTO: 31.5 PG (ref 26–34)
MCHC RBC AUTO-ENTMCNC: 34.9 G/DL (ref 31–37)
MCV RBC AUTO: 90.1 FL (ref 80–100)
MONOCYTES # BLD AUTO: 1.11 X10(3) UL (ref 0.1–1)
MONOCYTES NFR BLD AUTO: 11.5 %
NEUTROPHILS # BLD AUTO: 6.9 X10 (3) UL (ref 1.5–7.7)
NEUTROPHILS # BLD AUTO: 6.9 X10(3) UL (ref 1.5–7.7)
NEUTROPHILS NFR BLD AUTO: 71.2 %
OSMOLALITY SERPL CALC.SUM OF ELEC: 278 MOSM/KG (ref 275–295)
P-R INTERVAL: 130 MS
PHOSPHATE SERPL-MCNC: 4 MG/DL (ref 2.4–5.1)
PLATELET # BLD AUTO: 293 10(3)UL (ref 150–450)
POTASSIUM SERPL-SCNC: 4.4 MMOL/L (ref 3.5–5.1)
PROT SERPL-MCNC: 6 G/DL (ref 5.7–8.2)
PROTHROMBIN TIME: 15 SECONDS (ref 11.6–14.8)
Q-T INTERVAL: 348 MS
QRS DURATION: 90 MS
QTC CALCULATION (BEZET): 423 MS
R AXIS: 198 DEGREES
RBC # BLD AUTO: 4.64 X10(6)UL (ref 3.8–5.8)
SODIUM SERPL-SCNC: 132 MMOL/L (ref 136–145)
T AXIS: 128 DEGREES
TROPONIN I SERPL HS-MCNC: 124 NG/L (ref ?–53)
TROPONIN I SERPL HS-MCNC: 145 NG/L (ref ?–53)
TROPONIN I SERPL HS-MCNC: 155 NG/L (ref ?–53)
VENTRICULAR RATE: 89 BPM
WBC # BLD AUTO: 9.7 X10(3) UL (ref 4–11)

## 2025-04-10 PROCEDURE — 49180 BIOPSY ABDOMINAL MASS: CPT | Performed by: INTERNAL MEDICINE

## 2025-04-10 PROCEDURE — 99233 SBSQ HOSP IP/OBS HIGH 50: CPT | Performed by: HOSPITALIST

## 2025-04-10 PROCEDURE — 93306 TTE W/DOPPLER COMPLETE: CPT | Performed by: STUDENT IN AN ORGANIZED HEALTH CARE EDUCATION/TRAINING PROGRAM

## 2025-04-10 PROCEDURE — 99223 1ST HOSP IP/OBS HIGH 75: CPT | Performed by: INTERNAL MEDICINE

## 2025-04-10 PROCEDURE — 0DBW3ZX EXCISION OF PERITONEUM, PERCUTANEOUS APPROACH, DIAGNOSTIC: ICD-10-PCS | Performed by: RADIOLOGY

## 2025-04-10 PROCEDURE — 77012 CT SCAN FOR NEEDLE BIOPSY: CPT | Performed by: INTERNAL MEDICINE

## 2025-04-10 PROCEDURE — 99152 MOD SED SAME PHYS/QHP 5/>YRS: CPT | Performed by: INTERNAL MEDICINE

## 2025-04-10 RX ORDER — FLUMAZENIL 0.1 MG/ML
0.2 INJECTION INTRAVENOUS AS NEEDED
Status: DISCONTINUED | OUTPATIENT
Start: 2025-04-10 | End: 2025-04-10

## 2025-04-10 RX ORDER — MIDAZOLAM HYDROCHLORIDE 1 MG/ML
1 INJECTION INTRAMUSCULAR; INTRAVENOUS EVERY 5 MIN PRN
Status: ACTIVE | OUTPATIENT
Start: 2025-04-10 | End: 2025-04-10

## 2025-04-10 RX ORDER — MIDAZOLAM HYDROCHLORIDE 1 MG/ML
INJECTION INTRAMUSCULAR; INTRAVENOUS
Status: COMPLETED
Start: 2025-04-10 | End: 2025-04-10

## 2025-04-10 RX ORDER — HYDROCODONE BITARTRATE AND ACETAMINOPHEN 5; 325 MG/1; MG/1
1 TABLET ORAL EVERY 6 HOURS PRN
Qty: 20 TABLET | Refills: 0 | Status: ON HOLD | OUTPATIENT
Start: 2025-04-10

## 2025-04-10 RX ORDER — SODIUM CHLORIDE 9 MG/ML
INJECTION, SOLUTION INTRAVENOUS CONTINUOUS
Status: DISCONTINUED | OUTPATIENT
Start: 2025-04-10 | End: 2025-04-10

## 2025-04-10 RX ORDER — NALOXONE HYDROCHLORIDE 0.4 MG/ML
0.08 INJECTION, SOLUTION INTRAMUSCULAR; INTRAVENOUS; SUBCUTANEOUS AS NEEDED
Status: DISCONTINUED | OUTPATIENT
Start: 2025-04-10 | End: 2025-04-10

## 2025-04-10 NOTE — PROGRESS NOTES
Pt is alert and oriented x4. Pt has ivf running and was given norco for pain. Pt npo for bx possibly. Call light in reach and safety measures in place.

## 2025-04-10 NOTE — PROCEDURES
[facility]   part of [facility]  Procedure Note    Lencho Torres Patient Status:  Inpatient    1955 MRN [id number]   Location [facility] 4NW-A Attending Manuel Chávez MD   Hosp Day # 1 PCP Shayan Harris MD     Procedure: CT guided biopsy peritoneal RUQ    Pre-Procedure Diagnosis:  Carcinomatosis    Post-Procedure Diagnosis: Same    Anesthesia:  Local and Sedation    Findings:  6 x 20g core of RUQ omental/peritoneal soft tissue abnormality    Specimens: As above    Blood Loss:  Minimal    Tourniquet Time: None  Complications:  None  Drains:  None    Secondary Diagnosis:  None    Trav Scott MD  4/10/2025

## 2025-04-10 NOTE — IMAGING NOTE
0755- Spoke w/ SIL Hilario regarding ordered CT guided biopsy.  Instructed to keep pt NPO, hold blood thinners, and ensure that pt has working saline locked IV.  Informed by SIL Hliario that pt has been NPO since midnight and is consentable.  Radiology RN to call back and confirm time for procedure.

## 2025-04-10 NOTE — PROGRESS NOTES
[facility]   part of [facility]     Hospitalist Progress Note     Lencho Torres Patient Status:  Inpatient    1955 MRN [id number]   Location [facility] 4NW-A Attending Manuel Chávez MD   Hosp Day # 1 PCP Shayan Harris MD     Chief Complaint:   Chief Complaint   Patient presents with    Abdomen/Flank Pain    Shoulder Pain    Difficulty Breathing       Subjective:     Pain better, now only 2 of 10.    Objective:    Review of Systems:   6  point ROS completed and was negative, except for pertinent positive and negatives stated in subjective.    Vital signs:  Temp:  [97.8 °F (36.6 °C)-98.3 °F (36.8 °C)] 97.8 °F (36.6 °C)  Pulse:  [] 97  Resp:  [14-27] 15  BP: ()/(58-81) 131/76  SpO2:  [93 %-98 %] 95 %    Physical Exam:    General: No acute distress.   Respiratory: Clear to auscultation bilaterally. No wheezes. No rhonchi.  Cardiovascular: S1, S2. Regular rate and rhythm. No murmurs.  Abdomen: Soft, nontender, nondistended.    Extremities: No edema.    Diagnostic Data:    Labs:  Recent Labs   Lab 25  1314 04/10/25  0541   WBC 9.0 9.7   HGB 16.0 14.6   MCV 89.1 90.1   .0 293.0   INR 1.12 1.17       Recent Labs   Lab 25  1314 04/10/25  0541   GLU 73 89   BUN 28* 26*   CREATSERUM 1.54* 1.31*   CA 11.5* 10.9*   ALB 4.4 3.9   * 132*   K 4.5 4.4   CL 91* 94*   CO2 20.0* 26.0   ALKPHO 73 66   AST 45* 42*   ALT 24 22   BILT 1.1 0.7   TP 6.7 6.0       Estimated Creatinine Clearance: 58.4 mL/min (A) (based on SCr of 1.31 mg/dL (H)).    Recent Labs   Lab 25  1314 04/10/25  0541   PTP 14.5 15.0*   INR 1.12 1.17            COVID-19 Lab Results    COVID-19  No results found for: \"COVID19\"    Pro-Calcitonin  No results for input(s): \"PCT\" in the last 168 hours.    Cardiac  Recent Labs   Lab 25  1314   PBNP 907*       Creatinine Kinase  No results for input(s): \"CK\" in the last 168 hours.    Inflammatory Markers  Recent Labs   Lab 25  1314   LDH 1,470*        Recent Labs   Lab 04/09/25 2001 04/09/25  2346 04/10/25  0541   TROPHS 80* 124* 155*       Imaging: Imaging data reviewed in Epic.    Medications: Scheduled Medications[1]    Assessment & Plan:      # Abdominal pain in context of New peritoneal carcinomatosis   - Pain control with PO/IV meds     - CTA C/A/P with no PE, does show findings c/f extensive metastatic disease with probable peritoneal carcinomatosis with large 6.1cm x 6.2cm mesenteric mass   -biopsy planned for today    # Hx Follicular non-hodgkin's lymphoma      # Hyponatremia - IVF, trend chem    # Metabolic acidosis - resolved    # Hypercalcemia - IVF, improving with hydration    # LISSETT - IVF, improving     # NSTEMI, likely type II -no chest pain; not ACS imo    # Elevated BNP    - TTE ordered     # CAD     # HTN - holding home meds, resume as able       Plan of care discussed with patient and RN    Manuel Lozano MD    Supplementary Documentation:     Quality:  DVT Prophylaxis: scds only for now, pending biopsy  CODE status: see chart  [place]: no  Central line: no      Estimated date of discharge: 0-1 days  At this point Mr. Torres is expected to be discharge to: home             **Certification      PHYSICIAN Certification of Need for Inpatient Hospitalization - Initial Certification    Patient will require inpatient services that will reasonably be expected to span two midnight's based on the clinical documentation in H+P.   Based on patients current state of illness, I anticipate that, after discharge, patient will require TBD.         [1]    enoxaparin  40 mg Subcutaneous Daily

## 2025-04-10 NOTE — DISCHARGE SUMMARY
[facility]     Lencho Torres Patient Status:  Inpatient    1955 MRN [id number]   Location [facility] 4NW-A Attending Manuel Chávez MD   Hosp Day # 1 PCP Shayan Harris MD     Date of Admission: 2025  Date of Discharge: 4/10/2025  Discharge Disposition: home  Chief complaint:   Chief Complaint   Patient presents with    Abdomen/Flank Pain    Shoulder Pain    Difficulty Breathing         Discharge Diagnoses and Brief Synopsis:  # Abdominal pain in context of New peritoneal carcinomatosis; s/p biopsy  6 x 20g core of RUQ omental/peritoneal soft tissue abnormality ; results to be followed up outpatient; see IR report     # Hx Follicular non-hodgkin's lymphoma      # Hyponatremia      # Metabolic acidosis      # Hypercalcemia     # LISSETT      # NSTEMI, likely type II -no chest pain; not ACS imo; echo with preserved LV function. Mitral valve was not well-visualized but shows significant mitral annular calcification.     # CAD      # HTN         Lab/Test results pending at Discharge:   none        Admission History of Present Illness (author of HPI not necessarily myself; see H&P author): Lencho Torres is a 69 year old male with PMHX HTN, lymphoma who presents for abdominal pain.      Patient states that he went to his PCP for routine screening on , got elevated PSA level and was advised to follow-up with urology.  States that since  he has been having gradual onset of right upper quadrant abdominal pain, abdominal fullness and early satiety, weight loss, night sweats and day sweats.  He notes that he cannot sleep at night more than 45 minutes at a time due to bilateral shoulder pain radiating down the arm.  Does have a history of non-Hodgkin's lymphoma, lost to follow-up after remission in .  Currently denies any associated chest pain, shortness of breath.      Lace+ Score: 59  59-90 High Risk  29-58 Medium Risk  0-28   Low Risk       TCM Follow-Up Recommendation:   LACE > 58: High Risk of readmission after discharge from the hospital.  **Certification    Admission date was 4/9/2025.  Inpatient stay was shorter than expected.  Patient's Peritoneal carcinomatosis (HCC) was initially serious enough to expect a more lengthy hospitalization but patient improved faster than expected.                 Discharge Medication List:     Discharge Medications        START taking these medications        Instructions Prescription details   HYDROcodone-acetaminophen 5-325 MG Tabs  Commonly known as: Norco      Take 1 tablet by mouth every 6 (six) hours as needed for Pain.   Quantity: 20 tablet  Refills: 0            STOP taking these medications      lisinopril 10 MG Tabs  Commonly known as: Zestril                  Where to Get Your Medications        These medications were sent to [facility] [address] [place] [address] [address] [phone], [phone]  [address] [place] [address]      Phone: [phone]   HYDROcodone-acetaminophen 5-325 MG Tabs         [place]    Follow-up appointment:   Shayan Harris MD  [address]  [place] [address]  [phone]    Follow up in 1 week(s)      Mandi Johansen MD  [address] [facility] [address]  [facility]  [place] [address]  [phone]    Follow up  call for appointment    Juan Antonio Baker MD  [address] [facility] [address]                   [place] [address]  [phone]    Follow up  Follow up    Appointments for Next 30 Days 4/10/2025 - 5/10/2025        Date Arrival Time Visit Type Length Department Provider     4/25/2025 10:15 AM  [facility] MRI PROSTATE W/WO [[id number]] 90 min [facility] MRI EH MR RM3 (3T WIDE)    Patient Instructions:     You may be subject to a fee if you do not show up for your appointment or you cancel within 24 hours of your appointment.    Please arrive 30 minutes prior to your scheduled appointment time.  You will need time to change your clothes, fill out  screening forms, use the restroom, and may need an IV if your exam requires contrast.  If you arrive too late, your appointment may need to be rescheduled.    IF YOU REQUIRE ORAL SEDATION FOR YOUR MRI: Your physician is responsible for giving you a prescription for oral medication which you would fill at your local pharmacy. If you will be taking oral sedation, you must bring a  who will drive you home (the  does not necessarily have to stay throughout the procedure).        Location Instructions:     You may be subject to a fee if you do not show up for your appointment or you cancel within 24 hours of your appointment.  Your appointment will be at [facility] located at [address] [place] [address]. To reach Outpatient Registration, you may park or  in the South Parking Garage. Enter the double doors located on the ground floor and proceed to the lobby past the Information Desk to Outpatient Registration.&nbsp; The phone number for this location is [phone].  Because of the nature of the Emergency Department, please be advised of the possibility your appointment may be delayed at this location.  Due to safety reasons you will be required to change into a gown. You will be asked to remove all metallic items, such as watches, jewelry, hairpins, eyeglasses, hearing aids, and continuous glucose monitoring devices. Your purse, wallet or other personal items will remain in a secure locker or with your family during your exam.  Please bring your insurance card and photo ID. You will also need to bring your doctor's order unless your physician's office submitted the order electronically or faxed the order. Without the order, your test may be delayed or postponed.  Children: Children under the age of 12 must have another adult caregiver with them.&nbsp; Please do not bring your child/children without a caregiver.&nbsp; Because of the highly sensitive equipment and privacy of all  our patients, children will not be permitted in the exam rooms, unless otherwise noted and in accordance with departmental policy.  PATIENT RESPONSIBILITY ESTIMATE  - (Estimate) We will provide you with your estimated remaining deductible and coinsurance balance for your services at the time of check in.  - (Payment) Please be aware that you may be asked for payment at the time of service.  Masks are optional for all patients and visitors, unless otherwise indicated.                      Vital signs:  Temp:  [97.8 °F (36.6 °C)-98.3 °F (36.8 °C)] 97.8 °F (36.6 °C)  Pulse:  [] 98  Resp:  [11-20] 19  BP: ()/(58-83) 108/66  SpO2:  [93 %-97 %] 94 %    Physical Exam:    General: No acute distress.   Respiratory: Clear to auscultation bilaterally. No wheezes. No rhonchi.  Cardiovascular: S1, S2. Regular rate and rhythm. No murmurs.  Abdomen: Soft, nontender, nondistended.    Extremities: No edema.  -----------------------------------------------------------------------------------------------  PATIENT DISCHARGE INSTRUCTIONS: See electronic chart    Manuel Lozano MD    Time spent:   31 minutes

## 2025-04-10 NOTE — CONSULTS
[facility]  Report of Consultation    Lencho Torres Patient Status:  Inpatient    1955 MRN [id number]   [facility] 4NW-A Attending Manuel Chávez MD   Hosp Day # 1 PCP Shayan Harris MD     Reason for Consultation:    Abdominal pain, abnormal imaging.    History of Present Illness:    Lencho Torres is a 69 year old male that presented to the ER yesterday with worsening abdominal, flank, shoulder pain for about 3 to 4 weeks.  He has been more bloated, last week.  Night sweats.  CT in the ER yesterday showed extensive carcinomatosis with abdominal adenopathy.  Consult requested for same.    Past history significant for follicular lymphoma of scalp, initially detected in .  Received 4 doses of rituximab-followed by RT.  Recently found to have elevated PSA.  Has not seen urology.  Has had poor appetite, night sweats.  No change in bowel habits.  Last colonoscopy 8 years back.  Works as a  and second job in [employer].  Lives alone.    History:  Past Medical History[1]  Past Surgical History[2]  Family History[3]   reports that he quit smoking about 39 years ago. His smoking use included cigarettes. He started smoking about 61 years ago. He has a 22 pack-year smoking history. He has never used smokeless tobacco. He reports current alcohol use. He reports that he does not use drugs.    Allergies:  Allergies[4]    Medications:  Scheduled Medications[5]  PRN Medications[6]    Review of Systems:  A comprehensive 14 point review of systems was completed.  Pertinent positives and negatives noted in the the HPI.    Physical Exam:  Vital signs: Blood pressure 100/68, pulse 91, temperature 97.8 °F (36.6 °C), temperature source Oral, resp. rate 18, height 1.829 m (6'), weight 81 kg (178 lb 9.6 oz), SpO2 95%.  General: Alert, no obvious distress at present.  HEENT: Temporal weight loss.  Neck: No lymphadenopathy.  No JVD.   Respiratory: Clear to auscultation  bilaterally.  No wheezes. No rhonchi.  Cardiovascular: S1, S2.  Regular rate and rhythm.  No murmurs.   Abdomen: Firm, distended, moderate generalized tenderness.  Neurologic: No focal neurological deficits.      Laboratory Data:    Lab Results   Component Value Date    WBC 9.7 04/10/2025    HGB 14.6 04/10/2025    HCT 41.8 04/10/2025    .0 04/10/2025    CREATSERUM 1.31 04/10/2025    BUN 26 04/10/2025     04/10/2025    K 4.4 04/10/2025    CL 94 04/10/2025    CO2 26.0 04/10/2025    GLU 89 04/10/2025    CA 10.9 04/10/2025    ALB 3.9 04/10/2025    ALKPHO 66 04/10/2025    BILT 0.7 04/10/2025    TP 6.0 04/10/2025    AST 42 04/10/2025    ALT 22 04/10/2025    PTT 22.6 04/09/2025    INR 1.17 04/10/2025    PTP 15.0 04/10/2025    LIP 45 04/09/2025    PSA 11.86 04/09/2025    MG 2.0 04/10/2025    PHOS 4.0 04/10/2025       Imaging:    Imaging data reviewed in Epic.    Assessment/Plan:    # Abnormal imaging: Patient with abdominal pain, weight loss, night sweats.  Previous h/o follicular lymphoma scalp in 2007, limited disease.  Recently found to have elevated PSA.  Imaging is concerning for metastatic malignancy with significant carcinomatosis, abdominal adenopathy.  LDH and PSA are elevated.  Recommend biopsy of abdominal carcinomatosis/lymphadenopathy.  He is agreeable.    Discussed the pathology will not be back for a few days.  Recommend discharging once procedure done and pain is controlled.  Will contact them for follow-up outpatient once results are available.    # Abdominal pain: Wishes to be conservative with narcotics.  Norco does help.  He would be open to trying tramadol.    Thank you for allowing me to participate in the care Lencho Melissa.  From oncology perspective, okay to discharge once procedure done.  We will arrange for outpatient follow-up once results available.    Erum Johansen M.D.    [facility]    [facility]  [address] [place],  [address]    4/10/2025         [1]   Past Medical History:   Hx of lymphoma, non-Hodgkins    CUtaneous, b cell (venogopal)    Unspecified essential hypertension   [2]   Past Surgical History:  Procedure Laterality Date    Other surgical history  '12    Dozier tooth    Other surgical history Right     lasik   [3]   Family History  Problem Relation Age of Onset    Hypertension Brother         half brother    Lipids Brother     Stroke Mother    [4]   Allergies  Allergen Reactions    Ibuprofen WHEEZING   [5]    enoxaparin  40 mg Subcutaneous Daily   [6]   melatonin    polyethylene glycol (PEG 3350)    sennosides    bisacodyl    ondansetron    metoclopramide    benzonatate    guaiFENesin    glycerin-hypromellose-    sodium chloride    acetaminophen **OR** HYDROcodone-acetaminophen **OR** HYDROcodone-acetaminophen    HYDROmorphone **OR** HYDROmorphone **OR** HYDROmorphone    traZODone

## 2025-04-10 NOTE — H&P
[facility]   part of formerly [facility]   History & Physical    Lencho Torres Patient Status:  Inpatient    1955 MRN [id number]   Location [facility] 4NW-A Attending Manuel Chávez MD   Hosp Day # 1 PCP Shayan Harris MD     Admitting Diagnosis:   Carcinomatosis    History of Present Illness:   68 yo M w/ peritoneal carcinomatosis, presents for biopsy.  Notes fullness and abdominal pain.  ROS otherwise neg.    History   Past Medical History:  Past Medical History[1]    Past Surgical History:  Past Surgical History[2]    Social History:  Social History     Tobacco Use    Smoking status: Former     Current packs/day: 0.00     Average packs/day: 1 pack/day for 22.0 years (22.0 ttl pk-yrs)     Types: Cigarettes     Start date: 5/3/1963     Quit date: 5/3/1985     Years since quittin.9    Smokeless tobacco: Never   Substance Use Topics    Alcohol use: Yes     Alcohol/week: 0.0 standard drinks of alcohol     Comment: occ, cage 16        Family History:  Family History[3]    Allergies/Medications:   Allergies:  Allergies[4]    Medications:  Medications - Current[5]    Physical Exam & Review of Systems:   Physical Exam:    /83 (BP Location: Left arm)   Pulse 102   Temp 97.8 °F (36.6 °C) (Oral)   Resp 20   Ht 72\"   Wt 178 lb 9.6 oz   SpO2 96%   BMI 24.22 kg/m²     General: NAD  Neck: No JVD  Lungs: CTA bilat  Heart: RRR, S1, S2  Abdomen: Soft, NT/ND, BS+x4  Extremities: Warm, dry, no LE edema bilat  Pulses: 2+ bilat DP    Results:   Labs:  Recent Labs   Lab 25  1314 04/10/25  0541   RBC 5.13 4.64   HGB 16.0 14.6   HCT 45.7 41.8   MCV 89.1 90.1   MCH 31.2 31.5   MCHC 35.0 34.9   RDW 11.6 11.9   NEPRELIM 6.97 6.90   WBC 9.0 9.7   .0 293.0     Recent Labs   Lab 25  1314 04/10/25  0541   PTP 14.5 15.0*   INR 1.12 1.17   PTT 22.6*  --      Recent Labs   Lab 25  1314 04/10/25  0541   GLU 73 89   BUN 28* 26*   CREATSERUM 1.54* 1.31*   CA 11.5* 10.9*   * 132*   K 4.5  4.4   CL 91* 94*   CO2 20.0* 26.0       Assessment/Plan:   Impression: 68 yo M w/ peritoneal carcinomatosis    I have discussed with the patient and/or legal representative the potential benefits, risks, and side effects of this procedure, the likelihood of the patient achieving goals; and the potential problems that might occur during recuperation.  I discussed reasonable alternatives to the procedure, including risks, benefits and side effects related to the alternatives, and risks related to not receiving this procedure.      Recommendations: CT guided biopsy peritoneal     Trav Scott MD  4/10/2025  3:13 PM           [1]   Past Medical History:   Hx of lymphoma, non-Hodgkins    CUtaneous, b cell (venogopal)    Unspecified essential hypertension   [2]   Past Surgical History:  Procedure Laterality Date    Other surgical history  '12    Vevay tooth    Other surgical history Right     lasik   [3]   Family History  Problem Relation Age of Onset    Hypertension Brother         half brother    Lipids Brother     Stroke Mother    [4]   Allergies  Allergen Reactions    Ibuprofen WHEEZING   [5]   Current Outpatient Medications:     HYDROcodone-acetaminophen 5-325 MG Oral Tab, Take 1 tablet by mouth every 6 (six) hours as needed for Pain., Disp: 20 tablet, Rfl: 0

## 2025-04-10 NOTE — PROGRESS NOTES
Dr De Leon called and aware of elevated troponin. Cardiology consult called and notified of troponin level and consult.

## 2025-04-10 NOTE — PLAN OF CARE
Lencho Torres Patient Status:  Inpatient    1955 MRN [id number]   Location [facility] 4NW-A Attending Manuel Chávez MD   Hosp Day # 1 PCP Shayan Harris MD     Cardiology Nocturnal APN Note    Briefly: (Documentation from chart review)     Lencho Torres is a 69 F  who presented with elevated troponins came with abdominal pain bloated and dec appetite now with fatigue and dyspnea  and has a PMH/PSH of: nonhodgkins lymphoma poss lung biopsy in am      Past Medical History[1]    Primary Cardiologist none    Vital Signs:       2025    11:00 PM 4/10/2025    12:27 AM   Vitals History   BP  111/58   BP Location  Left arm   Pulse 99 90   Resp  18   Temp  98.3 °F (36.8 °C)   SpO2 93 % 96 %        Labs:   Lab Results   Component Value Date    WBC 9.0 2025    HGB 16.0 2025    HCT 45.7 2025    .0 2025    CREATSERUM 1.54 2025    BUN 28 2025     2025    K 4.5 2025    CL 91 2025    CO2 20.0 2025    GLU 73 2025    CA 11.5 2025    ALB 4.4 2025    ALKPHO 73 2025    BILT 1.1 2025    TP 6.7 2025    AST 45 2025    ALT 24 2025    PTT 22.6 2025    INR 1.12 2025    LIP 45 2025    PSA 11.86 2025    TROPHS 124 2025       Diagnostics:   CTA CHEST + CT ABD (W) + CT PEL (W) (CPT=71275/66948)  Result Date: 2025  PROCEDURE:  CTA CHEST + CT ABD (W) + CT PEL (W) SH(CPT=71275/21222)  [place]PARISON:  [place], CT CALCIUM SCORING SPECIAL, 2016, 7:40 AM.  INDICATIONS:  PA sent pt here for bloating, rUQ pain. press on belly button it hurts. cant eat or sleep. pt cant function. sharp pain right shoulder blade down to elbow.  TECHNIQUE:  CT of the chest (with CTA imaging), abdomen, and pelvis were obtained post- injection of non-ionic intravenous contrast material. Multi-planar reformatted/3-D images were created to optimize visualization of vascular anatomy.  Dose reduction  techniques were used. Dose information is transmitted to the ACR (American College of Radiology) NRDR (National Radiology Data Registry) which includes the Dose Index Registry.  PATIENT STATED HISTORY:(As transcribed by Technologist)  Right upper quadrant pain. Right chest pain that radiates to right shoulder to right elbow. 12 pound weight loss recently. Abdominal distention. Unable to eat.   CONTRAST USED:  100cc of Isovue 370  FINDINGS:   CHEST:  Heart size is within normal limits.  Small right and trace left pleural effusions are present.  The base of the neck is unremarkable.  There is no pulmonary embolism to the first subsegmental arterial level.  There are findings suspicious for metastatic disease within the chest with probable lymphadenopathy present in the region of the internal mammary arteries measuring up to 1.1 x 2.5  cm on the left.  There is probable subcarinal lymphadenopathy with 1 area measuring approximately 1.6 x 1.5 cm.  A prevascular space node measures up to 1.2 x 1.6 cm.  Central airways appear patent.  There is no bronchiectasis or peribronchial thickening.  Mild basilar atelectasis is present.  There is some nodular airspace opacity at the left lung base which is not well assessed due to the degree of motion.  This could represent atelectasis with other processes such as metastatic disease not excluded.  A probable right cardiophrenic angle mass is present with an area of soft tissue attenuation measuring approximately 3.0 x 3.1 cm.  ABDOMEN/PELVIS:  There is a markedly abnormal appearance of the abdomen and pelvis with findings suggestive of diffuse peritoneal carcinomatosis.  There is probable omental caking, free fluid as well as extensive mesenteric lymphadenopathy likely present.  A representative mesenteric mass measures up to 6.1 x 6.2 cm.  The liver, spleen, pancreas, adrenal glands and kidneys have an essentially unremarkable postcontrast appearance.  The bowel is limited  assessment as many loops are nondistended and lack of oral contrast also limits assessment.  Bowel wall thickening or serosal disease cannot be excluded on the basis of this exam.  Multilevel degenerative changes are present within the thoracolumbar spine.             CONCLUSION:  Findings concerning for extensive metastatic disease with probable peritoneal carcinomatosis present as described above.  There is evidence of metastatic disease within the chest with probable mediastinal lymphadenopathy present.  Nodular opacity at the left lung base may represent atelectasis with pneumonia or metastatic disease not entirely excluded.  There is no pulmonary embolism to the first subsegmental arterial level.  Critical results were discussed with Dr. Mcallister by Dr. Scott at approximately 1545 on 4/9/25.  Read back was performed.    LOCATION:  [id number]   Dictated by (CST): Trav Scott MD on 4/09/2025 at 3:28 PM     Finalized by (CST): Trav Scott MD on 4/09/2025 at 3:45 PM       XR CHEST AP PORTABLE  (CPT=71045)  Result Date: 4/9/2025  PROCEDURE:  XR CHEST AP PORTABLE  (CPT=71045)  TECHNIQUE:  AP chest radiograph was obtained.  COMPARISON:  None.  INDICATIONS:  PA sent pt here for bloating, rUQ pain. press on belly button it hurts. cant eat or sleep. pt cant function. sharp pain right shoulder blade down to elbow.  PATIENT STATED HISTORY: (As transcribed by Technologist)  Patient offered no additional history at this time.    FINDINGS:  Mild bibasilar linear opacities, which likely reflect atelectasis/scarring.  Correlate clinically for superimposed infection/inflammation.  No focal consolidation.  No pleural effusion.  No pneumothorax.  No pulmonary edema.  The cardiomediastinal silhouette is within normal limits.  No acute osseous findings.            CONCLUSION:  See above   LOCATION:  [id number]      Dictated by (CST): Maximilian Payton MD on 4/09/2025 at 2:04 PM     Finalized by (CST): Maximilian Payton MD on 4/09/2025 at 2:05 PM          Allergies:  Allergies[2]    Medications:  Current Hospital Medications[3]    Assessment:   EKG SR rate 95 no acute changes  CxR Mild bibasilar linear opacities, which likely reflect atelectasis/scarring.  Correlate clinically for superimposed infection/inflammation.  No focal consolidation.  No pleural effusion.  No pneumothorax.  No pulmonary edema.  The   cardiomediastinal silhouette is within normal limits.  No acute osseous findings.   CT chest no PE other details above  Trop 56 >  80 > 124  pbnp 907  LDH 1470 AST 45   K 4.5 creat 1.54  WBC 9 HH 16/45.7 plt 360   VS 92/63 to 10/65  HR  R 18-20 O2 sats 94-96% RA  Meds in ED IV NS bolus 1L  IV LR  100ml /hr  Hydrocodone 5/325         Plan:  Trend trops obtain EKG as indicated  Echo in am  Cbc cmp mag in am   - Continue to monitor overnight  - Formal Cardiology consult to follow in AM.       Odette Elmore NP  [facility]  4/10/2025  2:21 AM         [1]   Past Medical History:   Hx of lymphoma, non-Hodgkins    CUtaneous, b cell (venogopal)    Unspecified essential hypertension   [2]   Allergies  Allergen Reactions    Ibuprofen WHEEZING   [3]   sodium chloride    lactated ringers    melatonin    polyethylene glycol (PEG 3350)    sennosides    bisacodyl    ondansetron    metoclopramide    benzonatate    guaiFENesin    glycerin-hypromellose-    sodium chloride    enoxaparin    acetaminophen **OR** HYDROcodone-acetaminophen **OR** HYDROcodone-acetaminophen    HYDROmorphone **OR** HYDROmorphone **OR** HYDROmorphone    traZODone

## 2025-04-10 NOTE — PAYOR COMM NOTE
--------------  ADMISSION REVIEW     Payor: JESSI PARKER [facility]  Subscriber #:  [id number]  Authorization Number: [id number]    Admit date: 4/9/25  Admit time:  6:40 PM       REVIEW DOCUMENTATION:  ED Provider Notes signed by Mak Mcallister DO at 4/9/2025  9:08 PM       Author: Mak Mcallister DO Service: -- Author Type: Physician    Filed: 4/9/2025  9:08 PM Date of Service: 4/9/2025  2:43 PM Status: Signed     Patient Seen in: [facility] Emergency Department    History     Chief Complaint   Patient presents with    Abdomen/Flank Pain    Shoulder Pain    Difficulty Breathing     Stated Complaint: PA sent pt here for bloating, rUQ pain. press on belly button it hurts. cant eat    HPI  This is a very pleasant 69-year-old male who presents emergency room for evaluation of abdominal pain, patient states symptoms started a few months ago, symptoms have been slowly worsening, states been having abdominal pain, is bloated, states his appetite has been decreased and he has losing weight.  Patient did see urology and had markedly elevated PSA, MRI was recommended which she is currently awaiting his appointment.  Patient also scheduled to have colonoscopy in May.  He reports fatigue, denies chest pain but does feel dyspnea at times.  Denies tearing type chest or abdominal pain.  Denies any fevers or chills.  Denies melena or hematochezia.  Denies dysuria urgency or frequency.    Physical Exam     ED Triage Vitals   BP 04/09/25 1303 120/80   Pulse 04/09/25 1303 106   Resp 04/09/25 1303 18   Temp 04/09/25 1306 97.9 °F (36.6 °C)   Temp src 04/09/25 1306 Oral   SpO2 04/09/25 1303 98 %   O2 Device 04/09/25 1253 None (Room air)     Vital Signs  BP: 93/67  Pulse: 103  Resp: 18  Temp: 98.2 °F (36.8 °C)  Temp src: Oral  MAP (mmHg): 74    Oxygen Therapy  SpO2: 96 %  O2 Device: None (Room air)  Pulse Oximetry Type: Continuous  Oximetry Probe Site Changed: No  Pulse Ox Probe Location: Left hand    Physical Exam  GENERAL: Patient is awake, alert,  in no acute distress.  HEENT:  no scleral icterus.  Mucous membranes are moderately dry.    NECK: Neck is supple, there is no nuchal rigidity.    HEART: Regular rate and rhythm, no murmurs.  LUNGS: Clear to auscultation bilaterally.  No Rales, no rhonchi, no wheezing, no stridor.  ABDOMEN: Soft, nondistended, upper abdominal tender, bowel sounds are present, no rebound, no rigidity, no guarding.no pulsatile masses. No CVA tenderness  EXTREMITIES: No peripheral edema, no calf tenderness    ED Course     Labs Reviewed   COMP METABOLIC PANEL (14) - Abnormal; Notable for the following components:       Result Value    Sodium 131 (*)     Chloride 91 (*)     CO2 20.0 (*)     Anion Gap 20 (*)     BUN 28 (*)     Creatinine 1.54 (*)     Calcium, Total 11.5 (*)     eGFR-Cr 49 (*)     AST 45 (*)     All other components within normal limits   CBC WITH DIFFERENTIAL WITH PLATELET - Abnormal; Notable for the following components:    Lymphocyte Absolute 0.88 (*)     All other components within normal limits   TROPONIN I HIGH SENSITIVITY - Abnormal; Notable for the following components:    Troponin I (High Sensitivity) 56 (*)     All other components within normal limits   PRO BETA NATRIURETIC PEPTIDE - Abnormal; Notable for the following components:    Pro-Beta Natriuretic Peptide 907 (*)     All other components within normal limits   PTT, ACTIVATED - Abnormal; Notable for the following components:    PTT 22.6 (*)     All other components within normal limits   URINALYSIS WITH CULTURE REFLEX - Abnormal; Notable for the following components:    Spec Gravity >1.030 (*)     Ketones Urine 20 (*)     Protein Urine Trace (*)     RBC Urine 3-5 (*)     All other components within normal limits   LIPID PANEL - Abnormal; Notable for the following components:    Cholesterol, Total 220 (*)     HDL Cholesterol 30 (*)     Triglycerides 292 (*)     LDL Cholesterol 137 (*)     VLDL 55 (*)     Non HDL Chol 190 (*)     All other components within  normal limits   PSA TOTAL, DIAGNOSTIC - Abnormal; Notable for the following components:    Total PSA  11.86 (*)     All other components within normal limits   LDH - Abnormal; Notable for the following components:    LDH 1,470 (*)     All other components within normal limits   TROPONIN I HIGH SENSITIVITY - Abnormal; Notable for the following components:    Troponin I (High Sensitivity) 80 (*)     All other components within normal limits   LIPASE - Normal   PROTHROMBIN TIME (PT) - Normal   CEA - Normal   AFP, TUMOR MARKER, SERUM - Normal   CARBOHYDRATE ANTIGEN 19-9   TROPONIN I HIGH SENSITIVITY   TROPONIN I HIGH SENSITIVITY     EKG  Rate, intervals and axes as noted on EKG Report.  Rate: 95  Rhythm: Sinus Rhythm  Reading: Normal sinus rhythm, no ST elevation    MDM      Differential diagnosis before testing includes but not limited to pneumonia, pulmonary embolism, malignancy, electrolyte abnormality, anemia, which is a medical condition that poses a threat to life/function    Radiographic images  I personally reviewed the radiographs and my individual interpretation shows chest x-ray no pneumothorax  I also reviewed the official reports that showed chest x-ray: Mild bibasilar linear opacities, which likely reflect atelectasis/scarring.  Correlate clinically for superimposed infection/inflammation.  No focal consolidation.  No pleural effusion.  No pneumothorax.  No pulmonary edema.  The   cardiomediastinal silhouette is within normal limits.  No acute osseous finding     CTA chest: Findings concerning for extensive metastatic disease with probable peritoneal carcinomatosis present as described above.  There is evidence of metastatic disease within the chest with probable mediastinal lymphadenopathy present.      Nodular opacity at the left lung base may represent atelectasis with pneumonia or metastatic disease not entirely excluded.      There is no pulmonary embolism to the first subsegmental arterial level.      Discussion of management (consult/physicians, social work, pharmacy,ect) Dr. Yao hospitalist, Dr. Johansen oncology    Course of Events during Emergency Room Visit include IV established, patient placed on cardiac monitor and pulse ox.  Patient given IV fluid hydration.  CBC was unremarkable, troponin was elevated 56, lipase 45.  Chemistry sodium 131 potassium 4.5 bicarb 20 BUN 28 creatinine 1.54 glucose 73.  Urinalysis negative nitrate leukocyte esterase.  Chest x-ray and CT performed, CT concerning for extensive metastatic disease with probable peritoneal carcinomatosis.  Discussed with oncology and hospitalist.  Discussed all results with the patient, will admit for further evaluation and treatment.    Shared decision making was utilized     Disposition:  Admission  I have discussed with the patient the results of test, differential diagnosis, and treatment plan. They expressed clear understanding of these instructions and agrees to the plan provided.     Admission disposition: 4/9/2025  4:12 PM    Medical Decision Making  Disposition and Plan     Clinical Impression:  1. Peritoneal carcinomatosis (HCC)    2. Mediastinal lymphadenopathy    3. Hyponatremia    4. Hypercalcemia    5. Elevated troponin    6. Abdominal pain, generalized       Disposition:  Admit  4/9/2025  4:12 pm    Mka Mcallister, DO on 4/9/2025  9:08 PM      History and Physical     Chief Complaint: Abdominal pain     History of Present Illness:   69 year old male with PMHX HTN, lymphoma who presents for abdominal pain.      Patient states that he went to his PCP for routine screening on March 17, got elevated PSA level and was advised to follow-up with urology.  States that since March 17 he has been having gradual onset of right upper quadrant abdominal pain, abdominal fullness and early satiety, weight loss, night sweats and day sweats.  He notes that he cannot sleep at night more than 45 minutes at a time due to bilateral shoulder pain  radiating down the arm.  Does have a history of non-Hodgkin's lymphoma, lost to follow-up after remission in 2014.  Currently denies any associated chest pain, shortness of breath.      BP 99/70  Pulse 100  Temp 97.9 °F (36.6 °C) (Oral)  Resp 20  Ht 6'  Wt 176 lb (79.8 kg)  SpO2 94%  BMI 23.87 kg/m²     Lab 04/09/25  1314   WBC 9.0   HGB 16.0   MCV 89.1   .0   INR 1.12      GLU 73   BUN 28*   CREATSERUM 1.54*   CA 11.5*   ALB 4.4   *   K 4.5   CL 91*   CO2 20.0*   ALKPHO 73   AST 45*   ALT 24   BILT 1.1   TP 6.7     PTP 14.5   INR 1.12       ASSESSMENT / PLAN:      # Abdominal pain  # New peritoneal carcinomatosis  # Hx Follicular non-hodgkin's lymphoma    - Pain control with PO/IV meds     - CTA C/A/P with no PE, does show findings c/f extensive metastatic disease with probable peritoneal carcinomatosis with large 6.1cm x 6.2cm mesenteric mass   - suspect related to known NHL; also has elevated PSA PTA   - Defer additional imaging per Oncology consult     # Hyponatremia - IVF, trend chem  # Metabolic acidosis - IVF, trend chem  # Hypercalcemia - IVF  # LISSETT - IVF, trend chem     # NSTEMI, likely type II - trend trops  # Elevated BNP    - TTE ordered     # CAD   # HTN - holding home meds, resume as able       MDM : Patient's ER labs, imaging reviewed. AM labs ordered. ER management discussed with ED physician, decision made for patient to be admitted to the hospital for further medical management       4/10/2025  Hematology/Oncology  Reason for Consultation:  Abdominal pain, abnormal imaging.     History of Present Illness:  69 year old male that presented to the ER yesterday with worsening abdominal, flank, shoulder pain for about 3 to 4 weeks.  He has been more bloated, last week.  Night sweats.  CT in the ER yesterday showed extensive carcinomatosis with abdominal adenopathy.  Consult requested for same.     Past history significant for follicular lymphoma of scalp, initially detected in 2007.   Received 4 doses of rituximab-followed by RT.  Recently found to have elevated PSA.  Has not seen urology.  Has had poor appetite, night sweats.  No change in bowel habits.  Last colonoscopy 8 years back.  Works as a  and second job in [employer].  Lives alone.     Blood pressure 100/68, pulse 91, temp 97.8 °F (36.6 °C), Oral, resp. rate 18, height 6', weight 178 lb 9.6 oz, SpO2 95%.       Component Value Date     WBC 9.7 04/10/2025     HGB 14.6 04/10/2025     HCT 41.8 04/10/2025     .0 04/10/2025     CREATSERUM 1.31 04/10/2025     BUN 26 04/10/2025      04/10/2025     K 4.4 04/10/2025     CL 94 04/10/2025     CO2 26.0 04/10/2025     GLU 89 04/10/2025     CA 10.9 04/10/2025     ALB 3.9 04/10/2025     ALKPHO 66 04/10/2025     BILT 0.7 04/10/2025     TP 6.0 04/10/2025     AST 42 04/10/2025     ALT 22 04/10/2025     PTT 22.6 04/09/2025     INR 1.17 04/10/2025     PTP 15.0 04/10/2025     LIP 45 04/09/2025     PSA 11.86 04/09/2025     MG 2.0 04/10/2025     PHOS 4.0 04/10/2025       Assessment/Plan:  # Abnormal imaging: Patient with abdominal pain, weight loss, night sweats.  Previous h/o follicular lymphoma scalp in 2007, limited disease.  Recently found to have elevated PSA.  Imaging is concerning for metastatic malignancy with significant carcinomatosis, abdominal adenopathy.  LDH and PSA are elevated.  Recommend biopsy of abdominal carcinomatosis/lymphadenopathy.  He is agreeable.     Discussed the pathology will not be back for a few days.  Recommend discharging once procedure done and pain is controlled.  Will contact them for follow-up outpatient once results are available.     # Abdominal pain: Wishes to be conservative with narcotics.  Norco does help.  He would be open to trying tramadol.      Hospitalist Progress Note   Pain better, now only 2 of 10     Temp:  [97.8 °F (36.6 °C)-98.3 °F (36.8 °C)] 97.8 °F (36.6 °C)  Pulse:  [] 97  Resp:  [14-27] 15  BP: ()/(58-07)  131/76  SpO2:  [93 %-98 %] 95 %    Lab 04/09/25  1314 04/10/25  0541   WBC 9.0 9.7   HGB 16.0 14.6   MCV 89.1 90.1   .0 293.0   INR 1.12 1.17      GLU 73 89   BUN 28* 26*   CREATSERUM 1.54* 1.31*   CA 11.5* 10.9*   ALB 4.4 3.9   * 132*   K 4.5 4.4   CL 91* 94*   CO2 20.0* 26.0   ALKPHO 73 66   AST 45* 42*   ALT 24 22   BILT 1.1 0.7   TP 6.7 6.0     PTP 14.5 15.0*   INR 1.12 1.17     Lab 04/09/25  1314   PBNP 907*     Lab 04/09/25  1314   LDH 1,470*      Lab 04/09/25 2001 04/09/25  2346 04/10/25  0541   TROPHS 80* 124* 155*       Assessment & Plan:  # Abdominal pain in context of New peritoneal carcinomatosis   - Pain control with PO/IV meds     - CTA C/A/P with no PE, does show findings c/f extensive metastatic disease with probable peritoneal carcinomatosis with large 6.1cm x 6.2cm mesenteric mass   -biopsy planned for today     # Hx Follicular non-hodgkin's lymphoma      # Hyponatremia - IVF, trend chem     # Metabolic acidosis - resolved     # Hypercalcemia - IVF, improving with hydration     # LISSETT - IVF, improving     # NSTEMI, likely type II -no chest pain; not ACS imo     # Elevated BNP    - TTE ordered     # CAD      # HTN - holding home meds, resume as able     **Certification  PHYSICIAN Certification of Need for Inpatient Hospitalization - Initial Certification  Patient will require inpatient services that will reasonably be expected to span two midnight's based on the clinical documentation in H+P.   Based on patients current state of illness, I anticipate that, after discharge, patient will require TBD    MEDICATIONS ADMINISTERED:  HYDROcodone-acetaminophen (Norco) 5-325 MG per tab 1 tablet       Date Action Dose Route User    4/10/2025 0027 Given 1 tablet Oral Nida Clarke RN    4/9/2025 2010 Given 1 tablet Oral Nida Clarke RN          HYDROcodone-acetaminophen (Norco) 5-325 MG per tab 2 tablet       Date Action Dose Route User    4/10/2025 0807 Given 2 tablet Oral Parus,  SIL Hilario          iopamidol 76% (ISOVUE-370) injection for power injector       Date Action Dose Route User    4/9/2025 1523 Given 100 mL Intravenous Adelaide Beckwith L          lactated ringers infusion       Date Action Dose Route User    4/10/2025 1343 New Bag (none) Intravenous Yanelis Hoover RN    4/9/2025 1845 New Bag (none) Intravenous Lyn Crowley RN          sodium chloride 0.9% infusion       Date Action Dose Route User    4/9/2025 1604 New Bag 125 mL/hr Intravenous Nico Galvez RN     sodium chloride 0.9 % IV bolus 1,000 mL  Dose: 1,000 mL  Freq: Once Route: IV  Last Dose: Stopped (04/09/25 1506)  Start: 04/09/25 1334 End: 04/09/25 1506    1333 DM-New Bag   1506 DM-Stopped             Vitals       Date/Time Temp Pulse Resp BP SpO2 Weight O2 Device O2 Flow Rate (L/min) [facility] [place]    04/10/25 1300 97.8 °F (36.6 °C) 91 16 104/68 95 % -- None (Room air) --     04/10/25 0800 97.8 °F (36.6 °C) 97 15 131/76 95 % -- None (Room air) --     04/10/25 0439 97.8 °F (36.6 °C) 91 18 100/68 95 % -- None (Room air) --     04/10/25 0027 98.3 °F (36.8 °C) 90 18 111/58 96 % -- None (Room air) --     04/09/25 2300 -- 99 -- -- 93 % -- -- --     04/09/25 1915 98.2 °F (36.8 °C) 103 18 93/67 96 % 178 lb 9.6 oz (81 kg) None (Room air) -- AP    04/09/25 1815 -- 93 20 101/74 94 % -- -- --     04/09/25 1810 -- -- -- -- -- -- None (Room air) --     04/09/25 1758 -- 100 20 112/76 97 % -- -- --     04/09/25 1743 -- 99 14 122/81 96 % -- -- -- DM    04/09/25 1731 -- -- -- -- -- 176 lb (79.8 kg) -- -- SP    04/09/25 1633 -- 94 18 96/68 95 % -- -- -- DM    04/09/25 1615 -- 93 18 102/66 96 % -- -- -- DM    04/09/25 1600 -- 96 18 97/66 95 % -- -- -- DM    04/09/25 1505 -- 100 20 99/70 -- -- -- -- DM    04/09/25 1504 -- 97 -- 88/63 -- -- -- -- DM    04/09/25 1502 -- 105 27 89/67 -- -- -- -- DM    04/09/25 1433 -- 95 18 103/61 94 % -- -- -- DM    04/09/25 1403 -- 103 18 102/73 94 % -- -- -- DM    04/09/25 1345 -- 102 22  105/65 94 % -- -- -- DM    04/09/25 1330 -- 92 18 95/61 94 % -- -- -- DM    04/09/25 1325 -- 96 20 92/64 96 % -- -- -- DM    04/09/25 1324 -- 94 19 92/63 95 % -- -- -- DM    04/09/25 1306 97.9 °F (36.6 °C) -- -- -- -- -- -- -- DM    04/09/25 1303 -- 106 18 120/80 98 % 176 lb (79.8 kg) None (Room air) -- DM    04/09/25 1253 -- -- -- -- -- -- None (Room air) -- DM         FOR REVIEW/APPROVAL OF INPT ADMISSION

## 2025-04-10 NOTE — DISCHARGE INSTRUCTIONS
Discharge/After Visit [facility] - Department of Radiology  Biopsy - Renal (Kidney), Liver, Lung, Bone, Retroperitoneal Location    Post Sedation  Follow these guidelines:  You should be watched overnight by a responsible adult. This person should make sure your condition remains stable.   Do not drink any alcohol for 24 hours after the procedure.  Don’t drive, operate dangerous machinery, make important business or personal decisions, or sign legal documents for 24 hours after the procedure.  Note: Your healthcare provider may tell you not to take any medicine by mouth for pain or sleep for a period of time. These medicines may react with the medicine you were given during your procedure. This could cause a much stronger response than usual.     Activity:  Take it easy for the rest of the day after your biopsy.   You may be sore at the site of the biopsy for the next 5 days.   Do not do any strenuous exercises or lift over five pounds for the next 24 hours.     Biopsy Site:  Keep a bandage on the biopsy site for 24 hours after the procedure.   You may shower after 24 hours, but no soaking in a bathtub for 48 hours.     Lung Biopsy: If chest pain or shortness of breath occurs, go to the nearest Emergency Room.   Liver Biopsy: If there is any increase in right-sided back, shoulder, or abdominal pain, go to the nearest Emergency Room. Call your doctor for unusual dizziness or weakness.   Renal Biopsy: Report any bloody urine, bleeding at the site, swelling, or pain to your ordering provider,      Diet: Drink plenty of fluids and resume your usual home diet. A slow return to normal foods minimizes nausea.    Pain Management:   You may use over-the-counter or prescribed pain relief medication if it is not contraindicated by another condition.     Medications:  You may resume your usual home medications. You may resume blood thinners 24 hours after the procedure if there is no bleeding from/hematoma at  the puncture site or bloody urine (Renal Biopsy only)     When to seek medical advice:  Call the provider that ordered the biopsy with any questions and to discuss test results. These may also be available in your [facility] My Chart. You may also contact the Radiology Nurse at [phone], M-F, 8-5 with any additional questions or concerns.  Dial [phone] and ask the  to page the on-call IR Radiologist if any of these occur:    A change in color or temperature of the area where the biopsy was performed.  You develop increasing pain or shortness of breath.  Unusual drowsiness, weakness, or dizziness.  Unusual vomiting.     IF YOU FEEL YOU ARE EXPERIENCING AN EMERGENCY,   CALL 911 OR GO TO THE NEAREST EMERGENCY ROOM      4.2.24 MO/  Radiology

## 2025-04-10 NOTE — PLAN OF CARE
Problem: Patient/Family Goals  Goal: Patient/Family Long Term Goal  Description: Patient's Long Term Goal: Interventions:- - See additional Care Plan goals for specific interventions  Outcome: Progressing  Goal: Patient/Family Short Term Goal  Description: Patient's Short Term Goal: Interventions: - - See additional Care Plan goals for specific interventions  Outcome: Progressing     Problem: CARDIOVASCULAR - ADULT  Goal: Absence of cardiac arrhythmias or at baseline  Description: INTERVENTIONS:- Continuous cardiac monitoring, monitor vital signs, obtain 12 lead EKG if indicated- Evaluate effectiveness of antiarrhythmic and heart rate control medications as ordered- Initiate emergency measures for life threatening arrhythmias- Monitor electrolytes and administer replacement therapy as ordered  Outcome: Progressing     Problem: PAIN - ADULT  Goal: Verbalizes/displays adequate comfort level or patient's stated pain goal  Description: INTERVENTIONS:- Encourage pt to monitor pain and request assistance- Assess pain using appropriate pain scale- Administer analgesics based on type and severity of pain and evaluate response- Implement non-pharmacological measures as appropriate and evaluate response- Consider cultural and social influences on pain and pain management- Manage/alleviate anxiety- Utilize distraction and/or relaxation techniques- Monitor for opioid side effects- Notify MD/LIP if interventions unsuccessful or patient reports new pain- Anticipate increased pain with activity and pre-medicate as appropriate  Outcome: Progressing     Patient alert and oriented, patient reports improvement in pain level s/p medication. NPO for biopsy this afternoon, ok for sips of water w medication per radiology nurse. Patient ambulates in room, safety precautions in place. Patient updated progressing and in agreement with POC. Troponin elevated at 145, message to cardiac APN to inform, also requested cardiac evaluation as pt  will undergo sedation for biopsy today. APN will review. Ok per cardiology for patient to proceed with biopsy.   Patient returned from biopsy, alert and oriented, vitals stable. Patient tolerating diet. Biopsy site clean dry and intact.   Patient cleared from all services for DC. Site remains CDI. AVS reviewed at length w pt, pt states that all questions have been answered. Education on new medication provided and DC instructions. Patient updated and in agreement with POC and DC plan. Patient eager to DC home.   DC pending neighbor coming to  patient for discharge.    Patient discharged at approx 1750 in wheelchair accompanied by staff to private vehicle driven by neighbor, all belongings w patient.

## 2025-04-10 NOTE — DIETARY NOTE
[facility]   part of [facility]    NUTRITION ASSESSMENT    Unable to diagnose malnutrition criteria at this time.    NUTRITION INTERVENTION:    Meal and Snacks - Currently NPO for possible. biopsy.  Medical Food Supplements - Will evaluate need when diet is advanced. Rationale/use for oral supplements discussed.  Nutrition Education - Frequent high calorie/protein meals/snacks. Pt/family receptive to education, no barriers noted. Declined handouts.    PATIENT STATUS:     04/10/25 68 yo male. Presented with abdominal and shoulder pain. Decreased PO intake and weight loss. ER CT concerning for metastatic malignancy with significant carcinomatosis. Plan for biopsy today and may discharge after procedure.  MST triggered to be seen. Visited patient in room, reports decreased PO intake/appetite for about 1 month. He has been drinking a home made 8-12 oz protein shake (banana, egg protein powder, almond milk and yogurt) for breakfast and bagel with cream cheese with dinner. Discussed adding snacks to meal schedule to maximize nutrition intake. Recommended that snacks/meals are high in calories and protein to prevent further weight loss. Patient is a  and is know which foods are good source of protein, declined handout. Offered ONS, declined since plan is to discharge after procedure.   Patient reported  +- 1-2 lbs, current weight is -7 lbs/3% weight loss. Non significant per standards.    Pmhx - Includes HTN, folicular lymphoma of scalp.    ANTHROPOMETRICS:  Ht: 182.9 cm (6')  Wt: 81 kg (178 lb 9.6 oz).   BMI: Body mass index is 24.22 kg/m².  IBW: 80.9 kg      WEIGHT HISTORY:   Weight loss: Yes, Non-severe Wt loss of 7 lbs, 3%, over 1 months     8/13 83.9 kg (185 lbs)    Wt Readings from Last 10 Encounters:   04/09/25 81 kg (178 lb 9.6 oz)   04/08/25 80 kg (176 lb 6.4 oz)   09/01/22 79 kg (174 lb 3.2 oz)   02/07/22 83 kg (183 lb)   07/19/21 87.5 kg (193 lb)   07/26/16 86.2 kg (190 lb)    02/29/16 87.1 kg (192 lb)   02/23/15 87.1 kg (192 lb)   05/03/13 91.6 kg (202 lb)   02/06/07 88.5 kg (195 lb)        NUTRITION:  Diet:       Procedures    NPO      Food Allergies: No  Cultural/Ethnic/Adventist Preferences Addressed: Yes    Percent Meals Eaten (last 3 days)       None            GI system review: abdominal pain and distention Last BM Date: 04/09/25  Skin and wounds: intact    NUTRITION RELATED PHYSICAL FINDINGS:     1. Body Fat/Muscle Mass: unable to assess     2. Fluid Accumulation: none    NUTRITION PRESCRIPTION:   81 kg Actual Body Weight  Calories: 2025 - 2430calories/day (25-30 kcal/kg)  Protein: 97 - 122 grams protein/day (1.2-1.5 grams protein per kg)  Fluid: ~1 ml/kcal or per MD discretion    NUTRITION DIAGNOSIS/PROBLEM:  Inadequate energy intake related to altered GI function/GI disorder and insufficient appetite resulting in inadequate nutrition intake as evidenced by documented/reported insufficient oral intake and documented/reported unintentional weight loss      MONITOR AND EVALUATE/NUTRITION GOALS:  Return to PO intake or advance diet in 24-48 hrs - New      MEDICATIONS:  Gtt: LR @ 100 mL/hr    LABS:  Na++:132, Chloride 94, BUN:26, Cr:1.31, AST: 42, Triglycerides 292 (4/9)    Pt is at Moderate nutrition risk    Fatoumata Medellin RDN, LDN, [facility]  Clinical Dietitian   [id number]

## 2025-04-10 NOTE — CONSULTS
[facility]  Cardiology Consultation    Lencho Torres Patient Status:  Inpatient    1955 MRN [id number]   Location [facility] 4NW-A Attending Manuel Chávez MD   Hosp Day # 1 PCP Shayan Harris MD     Reason for Consultation:  Preop cardiovascular risk assessment    History of Present Illness:  Lencho Torres is a a(n) 69 year old male who presents with multiple complaints including abdominal pain, weight loss, right shoulder pain.    Patient has been not been feeling well since March.  He had PSA levels done which were elevated.  Since then, he has had gradual onset of right upper quadrant abdominal pain, abdominal fullness.  Weight loss, night sweats.  States he has had trouble sleeping.  As such, denies chest pressure, chest heaviness, chest pain.  Denies shortness of breath or dyspnea on exertion.  Denies lightheadedness or presyncope.  Denies syncope.  Denies orthopnea, PND or lower limb edema.  He is very active.  Works as a .  Exercises regularly with both weight and aerobic exercises.    Denies ongoing tobacco use.  Smoking approximately 40 years ago.  He does have a 20-year-smoking history.  Denies alcohol use.    Pertinent past medical history includes diagnosis of follicle non-Hodgkin lymphoma.  In the emergency room, he had a CT performed which showed extensive metastatic disease with probable peritoneal carcinomatosis with a large mesenteric mass.  He is being scheduled for a biopsy.  Cardiology consulted for perioperative cardiovascular risk assessment.    Of note, he had an elevated calcium in the past.  He was advised statin.  Patient states he is not in favor of taking statin.  He refused statins due to potential side effects.    In the hospital, his troponins were mildly elevated.    History:  Past Medical History[1]  Past Surgical History[2]  Family History[3]   reports that he quit smoking about 39 years ago. His smoking use included cigarettes. He started  smoking about 61 years ago. He has a 22 pack-year smoking history. He has never used smokeless tobacco. He reports current alcohol use. He reports that he does not use drugs.    Allergies:  Allergies[4]    Medications:  Current Hospital Medications[5]    Review of Systems:  A comprehensive review of systems was negative if not otherwise mention in above HPI.    /68 (BP Location: Right arm)   Pulse 91   Temp 97.8 °F (36.6 °C) (Oral)   Resp 16   Ht 182.9 cm (6')   Wt 178 lb 9.6 oz (81 kg)   SpO2 95%   BMI 24.22 kg/m²   Temp (24hrs), Av °F (36.7 °C), Min:97.8 °F (36.6 °C), Max:98.3 °F (36.8 °C)       Intake/Output Summary (Last 24 hours) at 4/10/2025 1414  Last data filed at 4/10/2025 0600  Gross per 24 hour   Intake 2100 ml   Output --   Net 2100 ml     Wt Readings from Last 3 Encounters:   25 178 lb 9.6 oz (81 kg)   25 176 lb 6.4 oz (80 kg)   22 174 lb 3.2 oz (79 kg)       Physical Exam:   General: Alert and oriented x 3. No apparent distress. No respiratory or constitutional distress.  HEENT: Normocephalic  Neck: No JVD  Cardiac: Regular rate and rhythm. S1, S2 normal. No murmur  Lungs: Clear without wheezes, rales, rhonchi or dullness.   Abdomen: Soft, non-tender.   Extremities: Without clubbing, cyanosis or edema.  Peripheral pulses are 2+.  Neurologic: Alert and oriented, normal affect.  Skin: Warm and dry.     Laboratory Data:  Lab Results   Component Value Date    WBC 9.7 04/10/2025    HGB 14.6 04/10/2025    HCT 41.8 04/10/2025    .0 04/10/2025    CREATSERUM 1.31 04/10/2025    BUN 26 04/10/2025     04/10/2025    K 4.4 04/10/2025    CL 94 04/10/2025    CO2 26.0 04/10/2025    GLU 89 04/10/2025    CA 10.9 04/10/2025    ALB 3.9 04/10/2025    ALKPHO 66 04/10/2025    BILT 0.7 04/10/2025    TP 6.0 04/10/2025    AST 42 04/10/2025    ALT 22 04/10/2025    INR 1.17 04/10/2025    PTP 15.0 04/10/2025    MG 2.0 04/10/2025    PHOS 4.0 04/10/2025     Recent Labs   Lab  04/09/25  2346 04/10/25  0541 04/10/25  1158   TROPHS 124* 155* 145*         Imaging:  Reviewed  Impression:    Preop cardiovascular risk assessment  Coronary calcification  Elevated troponin -likely nonischemic myocardial injury in setting of peritoneal carcinomatosis and LISSETT  Hypertension    Recommendations:    Patient presents for multiple vague symptoms.  He has been diagnosed with probable metastatic disease.  He has a mesenteric mass with possible peritoneal carcinomatosis.  He is scheduled for biopsy later today.  From a cardiac standpoint, he appears stable.  Denies exertional chest pain or pressure.  There is no sign or symptoms of acute heart failure, coronary syndrome, valvular heart disease or arrhythmias.  He is euvolemic on examination.  At this point, okay to proceed with biopsy.  He would be at low to moderate risk for perioperative cardiovascular events given nature of surgery, age, comorbidities and activity level.    Echocardiogram was personally reviewed.  Preserved LV function.  Mitral valve was not well-visualized but shows significant mitral annular calcification.    Patient is scheduled for discharge after biopsy.  Will follow-up as an outpatient.    Thank you for allowing me to participate in the care of your patient.    Juan Antonio Baker MD  4/10/2025  2:14 PM       [1]   Past Medical History:   Hx of lymphoma, non-Hodgkins    CUtaneous, b cell (venogopal)    Unspecified essential hypertension   [2]   Past Surgical History:  Procedure Laterality Date    Other surgical history  '12    Pana tooth    Other surgical history Right     lasik   [3]   Family History  Problem Relation Age of Onset    Hypertension Brother         half brother    Lipids Brother     Stroke Mother    [4]   Allergies  Allergen Reactions    Ibuprofen WHEEZING   [5]   Current Facility-Administered Medications:     sodium chloride 0.9% infusion, 125 mL/hr, Intravenous, Continuous    lactated ringers infusion, ,  Intravenous, Continuous    melatonin tab 3 mg, 3 mg, Oral, Nightly PRN    polyethylene glycol (PEG 3350) (Miralax) 17 g oral packet 17 g, 17 g, Oral, Daily PRN    sennosides (Senokot) tab 17.2 mg, 17.2 mg, Oral, Nightly PRN    bisacodyl (Dulcolax) 10 MG rectal suppository 10 mg, 10 mg, Rectal, Daily PRN    ondansetron (Zofran) 4 MG/2ML injection 4 mg, 4 mg, Intravenous, Q6H PRN    metoclopramide (Reglan) 5 mg/mL injection 5 mg, 5 mg, Intravenous, Q8H PRN    benzonatate (Tessalon) cap 200 mg, 200 mg, Oral, TID PRN    guaiFENesin (Robitussin) 100 MG/5 ML oral liquid 200 mg, 200 mg, Oral, Q4H PRN    glycerin-hypromellose- (Artificial Tears) 0.2-0.2-1 % ophthalmic solution 1 drop, 1 drop, Both Eyes, QID PRN    sodium chloride (Saline Mist) 0.65 % nasal solution 1 spray, 1 spray, Each Nare, Q3H PRN    acetaminophen (Tylenol) tab 650 mg, 650 mg, Oral, Q4H PRN **OR** HYDROcodone-acetaminophen (Norco) 5-325 MG per tab 1 tablet, 1 tablet, Oral, Q4H PRN **OR** HYDROcodone-acetaminophen (Norco) 5-325 MG per tab 2 tablet, 2 tablet, Oral, Q4H PRN    HYDROmorphone (Dilaudid) 1 MG/ML injection 0.2 mg, 0.2 mg, Intravenous, Q2H PRN **OR** HYDROmorphone (Dilaudid) 1 MG/ML injection 0.4 mg, 0.4 mg, Intravenous, Q2H PRN **OR** HYDROmorphone (Dilaudid) 1 MG/ML injection 0.8 mg, 0.8 mg, Intravenous, Q2H PRN    traZODone (Desyrel) tab 50 mg, 50 mg, Oral, Nightly PRN

## 2025-04-10 NOTE — PROGRESS NOTES
Lencho Torres for image guided retroperitoneal and/or abdominal mass biopsy.  Informed consent obtained by Dr. Scott. Positioned pt for procedure. VS monitored and documented in Flowsheet.     Obtained biopsy. Procedure completed. Specimen sent to Pathology.    Presently denies pain. Tolerated procedure well.   Transported pt to  404 accompanied by Piero MUJICA. Bedside report given to SIL Reynolds.

## 2025-04-11 ENCOUNTER — PATIENT OUTREACH (OUTPATIENT)
Dept: CASE MANAGEMENT | Age: 70
End: 2025-04-11

## 2025-04-11 ENCOUNTER — PATIENT MESSAGE (OUTPATIENT)
Dept: INTERNAL MEDICINE CLINIC | Facility: CLINIC | Age: 70
End: 2025-04-11

## 2025-04-11 NOTE — PROGRESS NOTES
Provider Clarification   Additional information about the patient's elevated Troponin Values:  Elevated Troponins due to Type 2 MI confirmed please specify cause if known: due to peritoneal carcinomatosis and pain    This note is part of the patient's medical record.

## 2025-04-11 NOTE — PROGRESS NOTES
NCM attempted to contact the patient for MONICA. No answer after multiple rings and no VM turned on. NCM will try again another time.

## 2025-04-11 NOTE — PAYOR COMM NOTE
--------------  DISCHARGE REVIEW    Payor: JESSI PARKER [facility]  Subscriber #:  [id number]  Authorization Number: [id number]    Admit date: 25  Admit time:   6:40 PM  Discharge Date: 4/10/2025  5:50 PM     Admitting Physician: Arben Yao MD  Attending Physician:  No att. providers found  Primary Care Physician: Shayan Harris MD          Discharge Summary Notes        Discharge Summary signed by Manuel Chávez MD at 4/10/2025  3:57 PM       Author: Manuel Chávez MD Specialty: HOSPITALIST, Internal Medicine Author Type: Physician    Filed: 4/10/2025  3:57 PM Date of Service: 4/10/2025  9:20 AM Status: Addendum    : Manuel Chávez MD (Physician)    Related Notes: Original Note by Manuel Chávez MD (Physician) filed at 4/10/2025  3:57 PM           [facility]     Lencho Torres Patient Status:  Inpatient    1955 MRN [id number]   Location [facility] 4NW-A Attending Manuel Chávez MD   Hosp Day # 1 PCP Shayan Harris MD     Date of Admission: 2025  Date of Discharge: 4/10/2025  Discharge Disposition: home  Chief complaint:   Chief Complaint   Patient presents with    Abdomen/Flank Pain    Shoulder Pain    Difficulty Breathing         Discharge Diagnoses and Brief Synopsis:  # Abdominal pain in context of New peritoneal carcinomatosis; s/p biopsy  6 x 20g core of RUQ omental/peritoneal soft tissue abnormality ; results to be followed up outpatient; see IR report     # Hx Follicular non-hodgkin's lymphoma      # Hyponatremia      # Metabolic acidosis      # Hypercalcemia     # LISSETT      # NSTEMI, likely type II -no chest pain; not ACS imo; echo with preserved LV function. Mitral valve was not well-visualized but shows significant mitral annular calcification.     # CAD      # HTN         Lab/Test results pending at Discharge:   none        Admission History of Present Illness (author of HPI not necessarily myself; see H&P author): Lencho Torres is a 69 year old male  with PMHX HTN, lymphoma who presents for abdominal pain.      Patient states that he went to his PCP for routine screening on March 17, got elevated PSA level and was advised to follow-up with urology.  States that since March 17 he has been having gradual onset of right upper quadrant abdominal pain, abdominal fullness and early satiety, weight loss, night sweats and day sweats.  He notes that he cannot sleep at night more than 45 minutes at a time due to bilateral shoulder pain radiating down the arm.  Does have a history of non-Hodgkin's lymphoma, lost to follow-up after remission in 2014.  Currently denies any associated chest pain, shortness of breath.      Lace+ Score: 59  59-90 High Risk  29-58 Medium Risk  0-28   Low Risk       TCM Follow-Up Recommendation:  LACE > 58: High Risk of readmission after discharge from the hospital.  **Certification    Admission date was 4/9/2025.  Inpatient stay was shorter than expected.  Patient's Peritoneal carcinomatosis (HCC) was initially serious enough to expect a more lengthy hospitalization but patient improved faster than expected.                 Discharge Medication List:     Discharge Medications        START taking these medications        Instructions Prescription details   HYDROcodone-acetaminophen 5-325 MG Tabs  Commonly known as: Norco      Take 1 tablet by mouth every 6 (six) hours as needed for Pain.   Quantity: 20 tablet  Refills: 0            STOP taking these medications      lisinopril 10 MG Tabs  Commonly known as: Zestril                  Where to Get Your Medications        These medications were sent to [facility] [address] [place] [address] [phone], [phone]  [address] [place] [address]      Phone: [phone]   HYDROcodone-acetaminophen 5-325 MG Tabs         [place]    Follow-up appointment:   Shayan Harris MD  [address] [address] [phone]    Follow up in 1  week(s)      Mandi Johansen MD  [address]  [facility]  [place] [address]  [phone]    Follow up  call for appointment    Juan Antonio Baker MD  [address]                   [facility] [address]  [phone]    Follow up  Follow up    Appointments for Next 30 Days 4/10/2025 - 5/10/2025        Date Arrival Time Visit Type Length Department Provider     4/25/2025 10:15 AM  [facility] MRI PROSTATE W/WO [5297] 90 min [facility] EH MR RM3 (3T WIDE)    Patient Instructions:     You may be subject to a fee if you do not show up for your appointment or you cancel within 24 hours of your appointment.    Please arrive 30 minutes prior to your scheduled appointment time.  You will need time to change your clothes, fill out screening forms, use the restroom, and may need an IV if your exam requires contrast.  If you arrive too late, your appointment may need to be rescheduled.    IF YOU REQUIRE ORAL SEDATION FOR YOUR MRI: Your physician is responsible for giving you a prescription for oral medication which you would fill at your local pharmacy. If you will be taking oral sedation, you must bring a  who will drive you home (the  does not necessarily have to stay throughout the procedure).        Location Instructions:     You may be subject to a fee if you do not show up for your appointment or you cancel within 24 hours of your appointment.  Your appointment will be at [facility] located at [address] [place] [address]. To reach Outpatient Registration, you may park or  in the South Parking Garage. Enter the double doors located on the ground floor and proceed to the lobby past the Information Desk to Outpatient Registration.&nbsp; The phone number for this location is [phone].  Because of the nature of the Emergency Department, please be advised of the possibility your appointment may be delayed at this location.  Due to safety  reasons you will be required to change into a gown. You will be asked to remove all metallic items, such as watches, jewelry, hairpins, eyeglasses, hearing aids, and continuous glucose monitoring devices. Your purse, wallet or other personal items will remain in a secure locker or with your family during your exam.  Please bring your insurance card and photo ID. You will also need to bring your doctor's order unless your physician's office submitted the order electronically or faxed the order. Without the order, your test may be delayed or postponed.  Children: Children under the age of 12 must have another adult caregiver with them.&nbsp; Please do not bring your child/children without a caregiver.&nbsp; Because of the highly sensitive equipment and privacy of all our patients, children will not be permitted in the exam rooms, unless otherwise noted and in accordance with departmental policy.  PATIENT RESPONSIBILITY ESTIMATE  - (Estimate) We will provide you with your estimated remaining deductible and coinsurance balance for your services at the time of check in.  - (Payment) Please be aware that you may be asked for payment at the time of service.  Masks are optional for all patients and visitors, unless otherwise indicated.                      Vital signs:  Temp:  [97.8 °F (36.6 °C)-98.3 °F (36.8 °C)] 97.8 °F (36.6 °C)  Pulse:  [] 98  Resp:  [11-20] 19  BP: ()/(58-83) 108/66  SpO2:  [93 %-97 %] 94 %    Physical Exam:    General: No acute distress.   Respiratory: Clear to auscultation bilaterally. No wheezes. No rhonchi.  Cardiovascular: S1, S2. Regular rate and rhythm. No murmurs.  Abdomen: Soft, nontender, nondistended.    Extremities: No edema.  -----------------------------------------------------------------------------------------------  PATIENT DISCHARGE INSTRUCTIONS: See electronic chart    Manuel Mcanelly, MD    Time spent:   31 minutes      Electronically signed by Manuel Chávez MD on  4/10/2025  3:57 PM         REVIEWER COMMENTS

## 2025-04-11 NOTE — TELEPHONE ENCOUNTER
This does not apply as patient was not seen at the ER on same day as our visit. Patient when to ED the following day.

## 2025-04-15 ENCOUNTER — PATIENT MESSAGE (OUTPATIENT)
Dept: INTERNAL MEDICINE CLINIC | Facility: CLINIC | Age: 70
End: 2025-04-15

## 2025-04-15 DIAGNOSIS — C78.6 PERITONEAL CARCINOMATOSIS (HCC): Primary | ICD-10-CM

## 2025-04-16 ENCOUNTER — TELEPHONE (OUTPATIENT)
Age: 70
End: 2025-04-16

## 2025-04-16 NOTE — TELEPHONE ENCOUNTER
Patient requesting referral to Dr Charlene Taylor for peritoneal carcinomatotis.     Referral pended.

## 2025-04-16 NOTE — TELEPHONE ENCOUNTER
Called patient to discuss pathology results. Left message indicating that I will have PSRs schedule him to see Dr Taylor to discuss next steps.

## 2025-04-17 ENCOUNTER — TELEPHONE (OUTPATIENT)
Age: 70
End: 2025-04-17

## 2025-04-17 ENCOUNTER — OFFICE VISIT (OUTPATIENT)
Age: 70
End: 2025-04-17
Attending: STUDENT IN AN ORGANIZED HEALTH CARE EDUCATION/TRAINING PROGRAM
Payer: MEDICARE

## 2025-04-17 VITALS
SYSTOLIC BLOOD PRESSURE: 102 MMHG | RESPIRATION RATE: 18 BRPM | HEART RATE: 119 BPM | OXYGEN SATURATION: 96 % | BODY MASS INDEX: 23 KG/M2 | DIASTOLIC BLOOD PRESSURE: 70 MMHG | WEIGHT: 166.19 LBS | TEMPERATURE: 97 F

## 2025-04-17 DIAGNOSIS — C78.6 PERITONEAL CARCINOMATOSIS (HCC): ICD-10-CM

## 2025-04-17 DIAGNOSIS — E87.1 HYPONATREMIA: ICD-10-CM

## 2025-04-17 DIAGNOSIS — N17.9 AKI (ACUTE KIDNEY INJURY): ICD-10-CM

## 2025-04-17 DIAGNOSIS — C83.33 DIFFUSE LARGE B-CELL LYMPHOMA OF INTRA-ABDOMINAL LYMPH NODES (HCC): Primary | ICD-10-CM

## 2025-04-17 DIAGNOSIS — R10.84 ABDOMINAL PAIN, GENERALIZED: ICD-10-CM

## 2025-04-17 RX ORDER — ALLOPURINOL 300 MG/1
300 TABLET ORAL DAILY
Qty: 15 TABLET | Refills: 0 | Status: ON HOLD | OUTPATIENT
Start: 2025-04-17

## 2025-04-17 NOTE — TELEPHONE ENCOUNTER
Called and spoke with patient at the request of Dr. Taylor. Informed him that his kidney numbers have increased and Dr. Taylor wants to start him on treatment as soon as possible. Asked him if he would be willing to come in as a direct admit to the hospital tomorrow to start chemotherapy after his PET. Patient stated he would be willing. Informed him to go to his PET scan at 11 am as previously discussed, and then we will have him go to be admitted after. Informed him I would send him a [facility] with further instructions once the admit is set up. Patient stated understanding and thanked me for the call.

## 2025-04-17 NOTE — TRANSFER CENTER NOTE
DIRECT ADMISSION    Admitting MD: Dr. Henry Yao  Called in by: Joana JAFFE  Call back #: [phone]  Date of admission: 4/18/2025  Diagnosis: Diffuse Large B cell lymphoma  Specialist MD:  Dr. Taylor  Hospitalist assigned: Dr. Henry Yao  Type of admission: INPT  Type of bed: Medical  Time of admission: 1300  Insurance Verification complete: Yes    Patient to have Pet scan at 11am prior to admission  Plan for archop chemo

## 2025-04-18 ENCOUNTER — HOSPITAL ENCOUNTER (OUTPATIENT)
Dept: NUCLEAR MEDICINE | Facility: HOSPITAL | Age: 70
Discharge: HOME | End: 2025-04-18
Attending: STUDENT IN AN ORGANIZED HEALTH CARE EDUCATION/TRAINING PROGRAM
Payer: MEDICARE

## 2025-04-18 PROBLEM — Z51.11 ADMISSION FOR CHEMOTHERAPY: Status: ACTIVE | Noted: 2025-01-01

## 2025-04-18 PROBLEM — N28.9 RENAL INSUFFICIENCY: Status: ACTIVE | Noted: 2025-01-01

## 2025-04-18 PROBLEM — C83.30 DIFFUSE LARGE B CELL LYMPHOMA (HCC): Status: ACTIVE | Noted: 2025-01-01

## 2025-04-18 PROBLEM — N28.9 RENAL INSUFFICIENCY: Status: ACTIVE | Noted: 2025-04-18

## 2025-04-18 PROBLEM — C83.30 DIFFUSE LARGE B CELL LYMPHOMA (HCC): Status: ACTIVE | Noted: 2025-04-18

## 2025-04-18 PROBLEM — E79.0 ELEVATED URIC ACID IN BLOOD: Status: ACTIVE | Noted: 2025-01-01

## 2025-04-18 PROBLEM — Z91.89 AT HIGH RISK OF TUMOR LYSIS SYNDROME: Status: ACTIVE | Noted: 2025-01-01

## 2025-04-18 PROBLEM — E79.0 ELEVATED URIC ACID IN BLOOD: Status: ACTIVE | Noted: 2025-04-18

## 2025-04-18 PROBLEM — Z91.89 AT HIGH RISK OF TUMOR LYSIS SYNDROME: Status: ACTIVE | Noted: 2025-04-18

## 2025-04-18 PROBLEM — Z51.11 ADMISSION FOR CHEMOTHERAPY: Status: ACTIVE | Noted: 2025-04-18

## 2025-04-18 NOTE — PLAN OF CARE
NURSING ADMISSION NOTE      Patient admitted via Wheelchair  Oriented to room.  Safety precautions initiated.  Bed in low position.  Call light in reach.

## 2025-04-18 NOTE — PROGRESS NOTES
Pharmacy Chemotherapy Clarification    BSA: 1.87 m2    4/19/25  Tylenol 650 mg po prior to Truxima  Benadryl 25 mg po prior to Truxima    Truxima 700 mg (375 mg/m2) in  ml over titrated rate based on concentration of 1.2 mg/ml, every 30 minutes based on tolerance     Zofran 16 mg in  ml over 15 min prior to chemo  Emend 150 mg in  ml over 20 min prior to chemo    Doxorubicin 94 mg (50 mg/m2) iv push over 10 min    Vincristine 2 mg (capped at 2 mg) in NS 50 ml over 5 min    Cytoxan 1400 mg (750 mg/m2) in  ml over 30 min      Prednisone 100 mg po daily x5 days (4/19 - 4/23)    Suze MedleyD

## 2025-04-18 NOTE — H&P
[facility]     Lencho Torres Patient Status:  Inpatient    1955 MRN [id number]   Location [facility] 4NW-A Attending Henry Yao, DO   Hosp Day # 0 PCP Shayan Harris MD     Chief Complaint: direct admission for chemotherapy    Subjective:    History of Present Illness:     Lencho Torres is a 69 year old male with pmhx of diffuse large B cell lymphoma, HTN who is directly admitted for chemotherapy infusion therapy and monitoring. He reports he is at his baseline. No chest pain/pressure, SOB/SWAN, LE swelling, fevers/chills, n/v/d, cough/congestion, URI/flu-like symptoms, recent sick contacts. He has some chronic abdominal pain and swelling, but he reports no new symptoms or change in his symptoms.     History/Other:    Past Medical History:  Past Medical History[1]  Past Surgical History:   Past Surgical History[2]   Family History:   Family History[3]  Social History:    reports that he quit smoking about 39 years ago. His smoking use included cigarettes. He started smoking about 62 years ago. He has a 22 pack-year smoking history. He has never used smokeless tobacco. He reports current alcohol use. He reports that he does not use drugs.     Allergies: Allergies[4]    Medications:  Medications Ordered Prior to Encounter[5]    Review of Systems:   A comprehensive review of systems was completed.    Pertinent positives and negatives noted in the HPI.    Objective:   Physical Exam:    Wt 147 lb 3.2 oz (66.8 kg)   BMI 19.96 kg/m²   General: No acute distress, Alert  Respiratory: No rhonchi, no wheezes  Cardiovascular: S1, S2. Regular rate and rhythm  Abdomen: Soft, Non-tender, +distended (reports chronic and at baseline), positive bowel sounds  Neuro: No new focal deficits  Extremities: No edema    Results:    Labs:      Labs Last 24 Hours:    No results for input(s): \"RBC\", \"HGB\", \"HCT\", \"MCV\", \"MCH\", \"MCHC\", \"RDW\", \"NEPRELIM\", \"WBC\", \"PLT\" in the last 168 hours.    Recent Labs    Lab 04/17/25  1436   GLU 71   BUN 37*   CREATSERUM 1.52*   EGFRCR 49*   CA 12.1*   ALB 4.5   *   K 5.2*   CL 91*   CO2 22.0   ALKPHO 99   AST 61*   ALT 28   BILT 0.8   TP 7.0       Estimated Glomerular Filtration Rate: 49 mL/min/1.73m2 (A) (result from lab).    Lab Results   Component Value Date    INR 1.17 04/10/2025    INR 1.12 04/09/2025       No results for input(s): \"TROP\", \"TROPHS\", \"CK\" in the last 168 hours.    No results for input(s): \"TROP\", \"PBNP\" in the last 168 hours.    No results for input(s): \"PCT\" in the last 168 hours.    Imaging: Imaging data reviewed in Epic.    Assessment & Plan:      #Diffuse large B cell lymphoma  -admit for inpatient chemotherapy infusion and close monitoring  -acyclovir, allopurinol, steroids  -MRI brain ordered  -CBC, CMP ordered  -follow CMP, LDH, uric acid, phos, mg q6h  -oncology consulted    #HTN  -controlled  -not on medications      Plan of care discussed with pt, RN    Lauren Dueñas,     Supplementary Documentation:     The 21st Century Cures Act makes medical notes like these available to patients in the interest of transparency. Please be advised this is a medical document. Medical documents are intended to carry relevant information, facts as evident, and the clinical opinion of the practitioner. The medical note is intended as peer to peer communication and may appear blunt or direct. It is written in medical language and may contain abbreviations or verbiage that are unfamiliar.                                       [1]   Past Medical History:   Hx of lymphoma, non-Hodgkins    CUtaneous, b cell (venogopal)    Unspecified essential hypertension   [2]   Past Surgical History:  Procedure Laterality Date    Other surgical history  '12    [place] tooth    Other surgical history Right     lasik   [3]   Family History  Problem Relation Age of Onset    Hypertension Brother         half brother    Lipids Brother     Stroke Mother    [4]    Allergies  Allergen Reactions    Ibuprofen WHEEZING   [5]   Current Facility-Administered Medications on File Prior to Encounter   Medication Dose Route Frequency Provider Last Rate Last Admin    [] midazolam (Versed) 2 MG/2ML injection 1 mg  1 mg Intravenous Q5 Min PRN Trav Scott MD   0.5 mg at 04/10/25 1527    [] fentaNYL (Sublimaze) 50 mcg/mL injection 50 mcg  50 mcg Intravenous Q5 Min PRN Trav Scott MD   25 mcg at 04/10/25 1525    [COMPLETED] sodium chloride 0.9 % IV bolus 1,000 mL  1,000 mL Intravenous Once Mak Mcallister DO   Stopped at 25 1506    [COMPLETED] iopamidol 76% (ISOVUE-370) injection for power injector  100 mL Intravenous ONCE PRN Mak Mcallister DO   100 mL at 25 1523    [] sodium chloride 0.9% infusion   Intravenous Continuous Mak Mcallister DO         Current Outpatient Medications on File Prior to Encounter   Medication Sig Dispense Refill    allopurinol 300 MG Oral Tab Take 1 tablet (300 mg total) by mouth daily. 15 tablet 0    HYDROcodone-acetaminophen 5-325 MG Oral Tab Take 1 tablet by mouth every 6 (six) hours as needed for Pain. 20 tablet 0

## 2025-04-18 NOTE — CONSULTS
Hem/Onc Report of Consultation    Patient Name: Lencho Torres   YOB: 1955   Medical Record Number: [id number]   CSN: [id number]   Consulting Physician: Dr. Charlene Taylor  Referring Provider(s): Dr. Henry Yao  Date of Consultation: 4/18/2025     The 21st Century Cures Act makes medical notes like these available to patients in the interest of transparency. Please be advised this is a medical document. Medical documents are intended to carry relevant information, facts as evident, and the clinical opinion of the practitioner. The medical note is intended as peer to peer communication and may appear blunt or direct. It is written in medical language and may contain abbreviations or verbiage that are unfamiliar.     Reason for Consultation: Eval and Treat DLBCL    Chief Complaint: No chief complaint on file.      Oncology/Hematology History:    2007, diagnosed with follicular lymphoma of scalp treated with 4 doses of rituximab followed by RT.  Had complete response and was on surveillance.  Recently found to have an elevated PSA.     2025 presented to the ER with worsening abdominal pain for 3 to 4 weeks.  CT showed extensive carcinomatosis with abdominal adenopathy.  Patient underwent IR guided biopsy of the peritoneal carcinomatosis That revealed diffuse large B-cell lymphoma, germinal center phenotype.     LDH elevated at 1470  Was noted to be hyponatremic, mild renal insufficiency with hypercalcemia on admission last week. CBC  Reveals normal blood counts, mild monocytosis.    April 18, 2025- Direct admission for Cycle one of R-remington-CHOP (remington omitted cycle one to begin with cycle 2 OP)    History of Present Illness    Lencho Torres is a 69 year old male with the above oncology history under the kind care of Dr. Charlene Taylor for a new diagnosis of diffuse large B cell lymphoma who presents for a direct admission to receive cycle 1 of R-chop (polatuzimab to be given outpatient).     Patient was seen  in clinic yesterday by Dr. Taylor and is here today for treatment given high risk for tumor lysis syndrome. Patient presented shortly after completing PET scan, no acute complaints. Doing ok overall.      Past Medical History:  Past Medical History[1]    Past Surgical History:  Past Surgical History[2]    Family Medical History:  Family History[3]    Psychosocial History:  Social History     Socioeconomic History    Marital status: Single     Spouse name: Not on file    Number of children: Not on file    Years of education: Not on file    Highest education level: Not on file   Occupational History    Occupation:    Tobacco Use    Smoking status: Former     Current packs/day: 0.00     Average packs/day: 1 pack/day for 22.0 years (22.0 ttl pk-yrs)     Types: Cigarettes     Start date: 5/3/1963     Quit date: 5/3/1985     Years since quittin.9    Smokeless tobacco: Never   Vaping Use    Vaping status: Never Used   Substance and Sexual Activity    Alcohol use: Yes     Alcohol/week: 0.0 standard drinks of alcohol     Comment: occ, cage 16    Drug use: No    Sexual activity: Not on file   Other Topics Concern    Not on file   Social History Narrative    Not on file     Social Drivers of Health     Food Insecurity: No Food Insecurity (2025)    NCSS - Food Insecurity     Worried About Running Out of Food in the Last Year: No     Ran Out of Food in the Last Year: No   Transportation Needs: No Transportation Needs (2025)    NCSS - Transportation     Lack of Transportation: No   Housing Stability: Not At Risk (2025)    NCSS - Housing/Utilities     Has Housing: Yes     Worried About Losing Housing: No     Unable to Get Utilities: No       Allergies:   Allergies[4]    Current Medications:  Current Medications[5]    Home Medications:  Medications Ordered Prior to this Encounter[6]    Review of Systems:  A comprehensive 14 point review of systems was completed.  Pertinent positives and  negatives noted in the the HPI.    Allergies:Allergies[7]      Vital Signs:  Wt 66.8 kg (147 lb 3.2 oz)   BMI 19.96 kg/m²     Last 3 Weights   04/18/25 1341 66.8 kg (147 lb 3.2 oz)   04/18/25 1000 75.4 kg (166 lb 3.6 oz)   04/17/25 1314 75.4 kg (166 lb 3.2 oz)   04/09/25 1915 81 kg (178 lb 9.6 oz)   04/09/25 1731 79.8 kg (176 lb)   04/09/25 1303 79.8 kg (176 lb)       Physical Examination:  General: Patient is alert and oriented x 3, not in acute distress.  Vital Signs: Wt 66.8 kg (147 lb 3.2 oz)   BMI 19.96 kg/m²   HEENT: EOMs intact. PERRL. Oropharynx is clear.   Chest: Clear to auscultation, respirations non-labored   Heart: Regular rate and rhythm.   Abdomen: Soft, non tender, non-distended with present bowel sounds.  Extremities: No lower extremity edema.  Neurological: Grossly intact.   Psych/Depression: mood and affect are appropriate    Labs:  Recent Results (from the past 24 hours)   Uric Acid    Collection Time: 04/17/25  2:36 PM   Result Value Ref Range    Uric Acid 22.6 (H) 3.7 - 9.2 mg/dL   LDH    Collection Time: 04/17/25  2:36 PM   Result Value Ref Range    LDH 2,571 (H) 120 - 246 U/L   Beta-2 Microglobulin, Serum    Collection Time: 04/17/25  2:36 PM   Result Value Ref Range    Beta 2 Microglobulin 14.05 (H) 1.00 - 2.40   HEPATITIS B PROFILE [E]    Collection Time: 04/17/25  2:36 PM   Result Value Ref Range    Hbsag Screen Index <0.10     Hepatitis B Surface Antigen Nonreactive Nonreactive     Hepatitis B Surface Antibody Nonreactive (A) Reactive     Heptatitis B Surface Ab Quant <3.10 mIU/mL    Hepatitis B Core Ab,Total Nonreactive Nonreactive    HCV ANTIBODY [E]    Collection Time: 04/17/25  2:36 PM   Result Value Ref Range    Hepatitis C Virus Nonreactive Nonreactive    COMP METABOLIC PANEL [E]    Collection Time: 04/17/25  2:36 PM   Result Value Ref Range    Glucose 71 70 - 99 mg/dL    Sodium 133 (L) 136 - 145 mmol/L    Potassium 5.2 (H) 3.5 - 5.1 mmol/L    Chloride 91 (L) 98 - 112 mmol/L     CO2 22.0 21.0 - 32.0 mmol/L    Anion Gap 20 (H) 0 - 18 mmol/L    BUN 37 (H) 9 - 23 mg/dL    Creatinine 1.52 (H) 0.70 - 1.30 mg/dL    Calcium, Total 12.1 (H) 8.7 - 10.6 mg/dL    Calculated Osmolality 283 275 - 295 mOsm/kg    eGFR-Cr 49 (L) >=60 mL/min/1.73m2    AST 61 (H) <34 U/L    ALT 28 10 - 49 U/L    Alkaline Phosphatase 99 45 - 117 U/L    Bilirubin, Total 0.8 0.2 - 1.1 mg/dL    Total Protein 7.0 5.7 - 8.2 g/dL    Albumin 4.5 3.2 - 4.8 g/dL    Globulin  2.5 2.0 - 3.5 g/dL    A/G Ratio 1.8 1.0 - 2.0    Patient Fasting for CMP? No    HIV Ag/Ab Combo    Collection Time: 04/17/25  2:36 PM   Result Value Ref Range    HIV Antigen Antibody Combo Non-Reactive Non-Reactive   HIV AG AB COMBO    Collection Time: 04/17/25  2:36 PM   Result Value Ref Range    HIV AG/AB COMBO EXT Non-Reactive Non-Reactive   POCT Glucose    Collection Time: 04/18/25 10:46 AM   Result Value Ref Range    POC Glucose 66 (L) 70 - 99 mg/dL     CBC:    Lab Results   Component Value Date    WBC 9.7 04/10/2025    WBC 9.0 04/09/2025    WBC 7.7 03/17/2025     Lab Results   Component Value Date    HGB 14.6 04/10/2025    HGB 16.0 04/09/2025    HGB 17.4 (H) 03/17/2025      Lab Results   Component Value Date    .0 04/10/2025    .0 04/09/2025     03/17/2025       Radiology:    No results found.      Impression/Plan    DLBCL, GCB subtype    - Hx of follicular lymphoma to scalp; DLBCL possible transformed from FL   - Given aggressive nature, concern for double hit, FISH panel is in process   - PET scan today raises concern for dural or parenchymal involvement of DLBCL    * MRI brain now to further assess     * Diagnostic LP for cytology and cell count tomorrow, will need PT/INR, CBC in am; HOLD LOVENOX for LP   - Due to high IPI, treatment with Rob-RCHP recommended   - STAT PICC line placement now   - ECHO completed 4/10/25, EF 65-70%, Hep C, HIV negative   - Rasburicase administration this evening with plan to transition to allopurinal  pending response   - Begin CHP portion first the morning of 4/19/25 followed by rituximab (patient high risk for rituximab reaction); provided dedicated chemoteaching and obtained consent   - Prophylaxis with acyclovir  mg BID    High Risk for Tumor Lysis Syndrome   - Rasburicase 3 mg IV now, follow uric acid, LDH, Mg,Phos, CMP, now then q6 hours for 4 occurrences, additional timing to be determined thereafter   - If uric acid remains elevated after 24 hours, can consider an additional dose of rasburicase 3 mg    Renal Insufficiency   - Multifactorial due to decreased PO intake, elevated uric acid, hypercalcemia    Is this a shared or split note between Advanced Practice Provider and Physician? No      Case discussed with Dr. Taylor, who is in agreement with the plan as outlined above.         Electronically Signed by:      Elvia Mast, MURIEL, AOCNP Nurse Practitioner  Hematology and Oncology               [1]   Past Medical History:   Hx of lymphoma, non-Hodgkins    CUtaneous, b cell (venogopal)    Unspecified essential hypertension   [2]   Past Surgical History:  Procedure Laterality Date    Other surgical history  '12    [place] tooth    Other surgical history Right     lasik   [3]   Family History  Problem Relation Age of Onset    Hypertension Brother         half brother    Lipids Brother     Stroke Mother    [4]   Allergies  Allergen Reactions    Ibuprofen WHEEZING   [5]    fosaprepitant (Emend) 150 mg in sodium chloride 0.9% 150 mL IVPB  150 mg Intravenous Once    ondansetron (Zofran) 16 mg in sodium chloride 0.9% 108 mL IVPB  16 mg Intravenous Once    prochlorperazine (Compazine) 10 MG/2ML injection 10 mg  10 mg Intravenous Q6H PRN    acetaminophen (Tylenol) tab 650 mg  650 mg Oral Once    diphenhydrAMINE (Benadryl) cap/tab 25 mg  25 mg Oral Once    riTUXimab-abbs (Truxima) 375 mg/m2 in sodium chloride 0.9% 500 mL infusion  375 mg/m2 Intravenous Once    acetaminophen (Tylenol) tab 650 mg  650  mg Oral Once PRN    diphenhydrAMINE (Benadryl) 50 mg/mL  injection 50 mg  50 mg Intravenous Once PRN    methylPREDNISolone sodium succinate (Solu-MEDROL) injection 125 mg  125 mg Intravenous Once PRN    EPINEPHrine (Adrenalin) 1 MG/ML injection (Allergic Reaction Kit) 0.3 mg  0.3 mg Intramuscular Once PRN    DOXOrubicin (Adriamycin) 2 mg/mL IV syringe 50 mg/m2  50 mg/m2 Intravenous Once    vinCRIStine (Oncovin) 1.4 mg/m2 in sodium chloride 0.9% 50 mL IVPB  1.4 mg/m2 Intravenous Once    cyclophosphamide (Cytoxan) 750 mg/m2 in sodium chloride 0.9% 250 mL infusion  750 mg/m2 Intravenous Once    predniSONE (Deltasone) tab 100 mg  100 mg Oral Once    [START ON 2025] predniSONE (Deltasone) tab 100 mg  100 mg Oral Daily with breakfast    rasburicase 3 mg in sodium chloride 0.9% 50 mL IVPB  3 mg Intravenous Once    acyclovir (Zovirax) tab 400 mg  400 mg Oral BID    [START ON 2025] allopurinol (Zyloprim) tab 300 mg  300 mg Oral Daily    sodium chloride 0.9% infusion   Intravenous Continuous    lidocaine PF (Xylocaine-MPF) 1% injection  5 mL Intradermal Once   [6]   Current Facility-Administered Medications on File Prior to Encounter   Medication    [] midazolam (Versed) 2 MG/2ML injection 1 mg    [] fentaNYL (Sublimaze) 50 mcg/mL injection 50 mcg    [COMPLETED] sodium chloride 0.9 % IV bolus 1,000 mL    [COMPLETED] iopamidol 76% (ISOVUE-370) injection for power injector    [] sodium chloride 0.9% infusion     Current Outpatient Medications on File Prior to Encounter   Medication Sig    allopurinol 300 MG Oral Tab Take 1 tablet (300 mg total) by mouth daily.    HYDROcodone-acetaminophen 5-325 MG Oral Tab Take 1 tablet by mouth every 6 (six) hours as needed for Pain.   [7]   Allergies  Allergen Reactions    Ibuprofen WHEEZING

## 2025-04-18 NOTE — CM/SW NOTE
04/18/25 1600   CM/SW Referral Data   Referral Source    Reason for Referral Discharge planning   Informant Patient     Pt is a 69 year old male with pmhx of diffuse large B cell lymphoma, admitted for chemo.     Acknowledged order for SDOH for transportation. Met with pt to introduce self and role. Pt is iADLs and driving. Pt stating that he has a car and drives. Per pt that if he is too weak to drive he has a good neighbor and a friend who are always available to him. Informed pt that I could provide additional transportation resources and pt declined. Pt stating that his friend will be transporting him home at discharge. No additional needs identified at this time.     RN to contact CM if needs arise.     Marilu Steinberg, MSN RN,   [phone]

## 2025-04-18 NOTE — PLAN OF CARE
Chemo education provided, chemo consent received. Hospitalist consulted. Admission navigator completed. PICC line placed to [facility], labs sent. Patient updated progressing and in agreement with POC. Per oncology APN, chemotherapy to start tomorrow. Safety precautions in place. 2 person skin check performed by writer and floor RN Quin. Pressure points intact, small abraison to r elbow, generalized redness, abrasion present PTA to R knee bandaid applied by patient.   MRI screening form completed for brain MRI. Call from lab that glucose was 61, juice provided to pt, bedside POC check at 89, pt w minimal to no PO intake, Dr. Dueñas informed, orders received to modify current fluid order to D5 0.9 @ 150.

## 2025-04-19 PROBLEM — G89.3 CANCER RELATED PAIN: Status: ACTIVE | Noted: 2025-04-19

## 2025-04-19 PROBLEM — E88.3: Status: ACTIVE | Noted: 2025-01-01

## 2025-04-19 PROBLEM — E88.3: Status: ACTIVE | Noted: 2025-04-19

## 2025-04-19 PROBLEM — G89.3 CANCER RELATED PAIN: Status: ACTIVE | Noted: 2025-01-01

## 2025-04-19 NOTE — PROGRESS NOTES
[facility]   part of [facility]     Hospitalist Progress Note     Lencho Torres Patient Status:  Inpatient    1955 MRN [id number]   [facility] 4NW-A Attending Paul Yao, DO   Hosp Day # 1 PCP Shayan Harris MD     Chief Complaint: Direct admission for chemotherapy    Subjective:     Patient resting in bed and feels well overall.  He reports pain is controlled    Objective:    Review of Systems:   A comprehensive review of systems was completed; pertinent positive and negatives stated in subjective.    Vital signs:  Temp:  [97.7 °F (36.5 °C)-98.6 °F (37 °C)] 97.9 °F (36.6 °C)  Pulse:  [] 106  Resp:  [16-18] 16  BP: (112-124)/(70-91) 112/75  SpO2:  [92 %-95 %] 92 %    Physical Exam:    General: No acute distress  Respiratory: No wheezes, no rhonchi  Cardiovascular: S1, S2, regular rate and rhythm  Abdomen: Soft, Non-tender, non-distended, positive bowel sounds  Neuro: No new focal deficits.   Extremities: No edema    Diagnostic Data:    Labs:  Recent Labs   Lab 25  1653 25  0549   WBC 10.2 11.1*   HGB 15.4 14.8   MCV 91.2 89.7   .0 218.0   INR  --  1.12       Recent Labs   Lab 25  1625 25  2253 25  0549   GLU 61* 87 93   BUN 49* 48* 44*   CREATSERUM 1.61* 1.54* 1.47*   CA 11.8* 11.0* 11.2*   ALB 4.2 3.9 3.9   * 131* 134*   K 5.1 5.3* 4.8   CL 88* 90* 92*   CO2 21.0 23.0 25.0   ALKPHO 91 92 90   AST 58* 58* 71*   ALT 28 27 27   BILT 0.9 0.6 0.7   TP 6.4 6.1 6.0       Estimated Glomerular Filtration Rate: 51 mL/min/1.73m2 (A) (result from lab).    No results for input(s): \"TROP\", \"TROPHS\", \"CK\" in the last 168 hours.    Recent Labs   Lab 25  0549   PTP 14.5   INR 1.12                  Microbiology    No results found for this visit on 25.      Imaging: Reviewed in Epic.    Medications: Scheduled Medications[1]    Assessment & Plan:      #Diffuse large B cell lymphoma  -acyclovir, allopurinol, steroids  -MRI brain shows no  metastatic disease, high FLAIR signal abnormality in left frontal lobe  -Possible plan for LP today  -Plan to start inpatient chemotherapy afterwards  -oncology following    #HTN  -controlled  -not on medications      Paul Yao DO    Supplementary Documentation:     Quality:  DVT Mechanical Prophylaxis:   SCDs,    DVT Pharmacologic Prophylaxis   Medication    [Held by provider] enoxaparin (Lovenox) 40 MG/0.4ML SUBQ injection 40 mg                Code Status: Not on file  [place]: No urinary catheter in place  [place] Duration (in days):   Central line:    KAY:     Discharge is dependent on: Clinical improvement  At this point Mr. Torres is expected to be discharge to: Home    The 21st Century Cures Act makes medical notes like these available to patients in the interest of transparency. Please be advised this is a medical document. Medical documents are intended to carry relevant information, facts as evident, and the clinical opinion of the practitioner. The medical note is intended as peer to peer communication and may appear blunt or direct. It is written in medical language and may contain abbreviations or verbiage that are unfamiliar.              **Certification      PHYSICIAN Certification of Need for Inpatient Hospitalization - Initial Certification    Patient will require inpatient services that will reasonably be expected to span two midnight's based on the clinical documentation in H+P.   Based on patients current state of illness, I anticipate that, after discharge, patient will require TBD.           [1]    fosaprepitant (Emend) 150 mg in sodium chloride 0.9% 150 mL IVPB  150 mg Intravenous Q24H    ondansetron (Zofran) 16 mg in sodium chloride 0.9% 108 mL IVPB  16 mg Intravenous Q24H    acetaminophen  650 mg Oral Q24H    diphenhydrAMINE  25 mg Oral Q24H    riTUXimab-abbs (Truxima) 700 mg in sodium chloride 0.9% 570 mL infusion  375 mg/m2 Intravenous Q24H    DOXOrubicin  50 mg/m2 Intravenous Q24H     vinCRIStine (Oncovin) 2 mg in sodium chloride 0.9% 52 mL IVPB  2 mg Intravenous Q24H    cyclophosphamide (Cytoxan) 1,400 mg in sodium chloride 0.9% 257 mL infusion  750 mg/m2 Intravenous Q24H    acyclovir  400 mg Oral BID    allopurinol  300 mg Oral Daily    [START ON 4/20/2025] predniSONE  100 mg Oral Daily with breakfast    predniSONE  100 mg Oral Daily with breakfast    [Held by provider] enoxaparin  40 mg Subcutaneous Daily

## 2025-04-19 NOTE — DIETARY NOTE
[facility]   part of [facility]    NUTRITION ASSESSMENT    Pt meets moderate malnutrition criteria at this time.    NUTRITION INTERVENTION:    Meal and Snacks - Monitor PO intake  Medical Food Supplements - Ensure Plus High Protein BID. Rationale/use for oral supplements discussed.      PATIENT STATUS:04/19/25 69 year old male admitted 4/18. Pt has new diagnosis of diffuse large B cell lymphoma and is a direct admission to receive cycle 1 of R-chop  Consult received today for wt loss.  Visited patient in room, reports decreased PO intake/appetite. +Bloating with meals. Likes to drink smoothies at home. Ensure +HP order by MD, modified order to BID as pt states he wont take more than that.  Now with 12 lbs/6% weight loss x 1 month-significant per standards.    Pmhx - Includes HTN, folicular lymphoma of scalp.    ANTHROPOMETRICS:  Ht: 182.9 cm (6')  Wt: 74.6 kg (164 lb 7.4 oz).   BMI: Body mass index is 22.31 kg/m².  IBW: 80.9 kg      WEIGHT HISTORY:   Weight loss: Yes, Severe Wt loss of 12 lbs, 6%, over 1 months       Wt Readings from Last 10 Encounters:   04/19/25 74.6 kg (164 lb 7.4 oz)   04/17/25 75.4 kg (166 lb 3.2 oz)   04/09/25 81 kg (178 lb 9.6 oz)   04/08/25 80 kg (176 lb 6.4 oz)   09/01/22 79 kg (174 lb 3.2 oz)   02/07/22 83 kg (183 lb)   07/19/21 87.5 kg (193 lb)   07/26/16 86.2 kg (190 lb)   02/29/16 87.1 kg (192 lb)   02/23/15 87.1 kg (192 lb)        NUTRITION:  Diet:       Procedures    Regular/General diet Is Patient on Accuchecks? No      Food Allergies: No  Cultural/Ethnic/Taoist Preferences Addressed: Yes    Percent Meals Eaten (last 3 days)       Date/Time Percent Meals Eaten (%)    04/18/25 1526 1 %     Percent Meals Eaten (%): ice cream at 04/18/25 1526            GI system review: abdominal pain and distention Last BM Date: 04/18/25  Skin and wounds: intact    NUTRITION RELATED PHYSICAL FINDINGS:     1. Body Fat/Muscle Mass: mild depletion body fat Orbital fat pad, Buccal fat pad,  and Triceps and mild muscle depletion Temple region, Clavicle region, and Shoulder/Acromion process     2. Fluid Accumulation: none    NUTRITION PRESCRIPTION:   74.6 kg Actual Body Weight  Calories: 1840-8783 calories/day (25-30 kcal/kg)  Protein:  grams protein/day (1.2-1.5 grams protein per kg)  Fluid: ~1 ml/kcal or per MD discretion    NUTRITION DIAGNOSIS/PROBLEM:  Malnutrition related to altered GI function/GI disorder and insufficient appetite resulting in inadequate nutrition intake as evidenced by documented/reported insufficient oral intake and documented/reported unintentional weight loss      MONITOR AND EVALUATE/NUTRITION GOALS:  PO intake of 75% of meals TID - New  PO intake of 75% of oral nutrition supplement/s - New  Weight stable within 1 to 2 lbs during admission - New      MEDICATIONS:  Gtt: D5 + NS @150    LABS:  Na++:136, Chloride 94,, BUN:44 Cr:1.277      Pt is at Moderate nutrition risk      Katie Lozano RD, LDN  Clinical Nutrition  [id number]

## 2025-04-19 NOTE — PROGRESS NOTES
Hematology/Oncology Progress Note    Patient Name: Lencho Torres  Medical Record Number: [id number]    YOB: 1955     Reason for Consultation:  Lencho Torres was seen today for the diagnosis of DLBCL, tumor lysis syndrome, LISSETT    Interval events: Patient reports pain is adequately controlled with oxycodone at this time.  Awaiting lumbar puncture this morning to evaluate for possible CNS disease after PET scan showed possible concern for this as below.  No neurologic changes.    Inpatient Meds:  Scheduled Medications[1]  Medication Infusions[2]  PRN Meds:  PRN Medications[3]  Allergies:   Allergies[4]    Vital Signs:  Height: --  Weight: 74.6 kg (164 lb 7.4 oz) (04/19 0700)  BSA (Calculated - sq m): --  Pulse: 100 (04/19 1051)  BP: 132/84 (04/19 1051)  Temp: 97.9 °F (36.6 °C) (04/19 0351)  Do Not Use - Resp Rate: --  SpO2: 95 % (04/19 1051)  Wt Readings from Last 6 Encounters:   04/19/25 74.6 kg (164 lb 7.4 oz)   04/17/25 75.4 kg (166 lb 3.2 oz)   04/09/25 81 kg (178 lb 9.6 oz)   04/08/25 80 kg (176 lb 6.4 oz)   09/01/22 79 kg (174 lb 3.2 oz)   02/07/22 83 kg (183 lb)     Physical Examination:  General: Patient is alert and oriented, not in acute distress  CV: Regular rate and rhythm, no murmurs, no LE edema  Respiratory: Lungs clear to auscultation bilaterally  GI/Abd: Soft, +mild tenderness.   Skin: no rashes or petechiae  Lines: +PICC    Laboratory:  Recent Labs   Lab 04/18/25  1653 04/19/25  0549   WBC 10.2 11.1*   HGB 15.4 14.8   HCT 45.5 42.8   .0 218.0   MCV 91.2 89.7   RDW 11.9 11.9   NEPRELIM 7.61 8.84*     Recent Labs   Lab 04/18/25  1625 04/18/25  2253 04/19/25  0549   * 131* 134*   K 5.1 5.3* 4.8   CL 88* 90* 92*   CO2 21.0 23.0 25.0   BUN 49* 48* 44*   CREATSERUM 1.61* 1.54* 1.47*   GLU 61* 87 93   CA 11.8* 11.0* 11.2*   PHOS 5.3* 4.3 3.4   TP 6.4 6.1 6.0   ALB 4.2 3.9 3.9   ALKPHO 91 92 90   AST 58* 58* 71*   ALT 28 27 27   BILT 0.9 0.6 0.7     Recent Labs   Lab 04/19/25  0549   INR  1.12     Imaging:    PET/CT 4/18: CONCLUSION:     1. Widespread, intensely hypermetabolic disease involving bone marrow, pleura, omentum, peritoneum, mesentery, and multiple lymph nodes of the neck and thorax concordant with biopsy-proven diffuse large B-cell lymphoma.    2. Focal hypermetabolism appears localized intracranially within left posterior fossa suspicious for either dural or intraparenchymal metastasis.  This may be better assessed with brain MRI.     MRI brain 4/19: CONCLUSION:     1. No evidence of parenchymal metastatic disease.    2. The 8 mm high FLAIR signal abnormality in the left frontal lobe subcortical white matter the demonstrates likely T2 shine through on diffusion-weighted images is likely sequelae of a subacute/chronic infarct.  There is volume loss in this region.    This is highly unlikely to represent a nonenhancing focus of brain metastatic disease.  However, continued follow-up as needed may be done.     Impression & Plan:     *DLBCL, arising from histologic transformation from FL  - Following with Dr. Taylor.  High tumor burden with spontaneous tumor lysis syndrome requiring inpatient chemotherapy for ongoing close monitoring and management.  - PET/CT scan suggested possible left posterior fossa CNS disease, MRI brain performed which does not appear to be as suspicious for this though patient to undergo LP today for CSF analysis to further evaluate.  - Following LP today, patient is to start cycle 1 R-CHOP chemotherapy today as inpatient.  Rituximab can be delayed until tomorrow.  As an outpatient, Dr. Taylor is planning to switch to R-PolaCHP.    *tumor lysis syndrome  - Markedly elevated uric acid with renal insufficiency even prior to starting chemotherapy. S/p rasburicase 3 mg yesterday with minimal improvement in uric acid.  Repeat labs this afternoon and if not further improved will give another dose of rasburicase and consider increasing allopurinol to 300 mg  BID.  -IVF  -Continue to monitor TLS labs closely    *LISSETT  - Likely related to tumor lysis syndrome, hypercalcemia  -IVF  - Monitor BUN/Cre closely    *Hypercalcemia  - Likely related to underlying lymphoma.  Anticipate improvement with response to treatment.  Ongoing IVF.  Monitor CMP.    *Cancer related pain  - Extensive malignant peritoneal involvement causing pain.  - Oxycodone 5mg prn    Johann Dhaliwal MD  Hematology/Medical Oncology  [facility] spent >50 minutes today face to face with the patient as well as on coordination of care of complex diagnosis and management as above.        [1]    fosaprepitant (Emend) 150 mg in sodium chloride 0.9% 150 mL IVPB  150 mg Intravenous Q24H    ondansetron (Zofran) 16 mg in sodium chloride 0.9% 108 mL IVPB  16 mg Intravenous Q24H    acetaminophen  650 mg Oral Q24H    diphenhydrAMINE  25 mg Oral Q24H    riTUXimab-abbs (Truxima) 700 mg in sodium chloride 0.9% 570 mL infusion  375 mg/m2 Intravenous Q24H    DOXOrubicin  50 mg/m2 Intravenous Q24H    vinCRIStine (Oncovin) 2 mg in sodium chloride 0.9% 52 mL IVPB  2 mg Intravenous Q24H    cyclophosphamide (Cytoxan) 1,400 mg in sodium chloride 0.9% 257 mL infusion  750 mg/m2 Intravenous Q24H    acyclovir  400 mg Oral BID    allopurinol  300 mg Oral Daily    [START ON 4/20/2025] predniSONE  100 mg Oral Daily with breakfast    predniSONE  100 mg Oral Daily with breakfast    [Held by provider] enoxaparin  40 mg Subcutaneous Daily   [2]    dextrose 5%-sodium chloride 0.9% 150 mL/hr at 04/18/25 1903   [3]   prochlorperazine    acetaminophen    diphenhydrAMINE    methylPREDNISolone    EPINEPHrine    melatonin    ondansetron    polyethylene glycol (PEG 3350)    sennosides    bisacodyl    fleet enema    HYDROcodone-acetaminophen    oxyCODONE  [4]   Allergies  Allergen Reactions    Ibuprofen WHEEZING

## 2025-04-19 NOTE — PROGRESS NOTES
Resumed care at 2200, pt A&Ox4 on room air, complaints of pain to shoulders, prn pain medication given as needed. Tolerating medications well per mar. IV fluids infusing. CMP, LDH, uric acid, phos, and mag labs drawn Q6hr. Pt npo at midnight, plan for 1st round of chemo, lumbar puncture and MRI of brain. Call light within reach, frequent checks made, needs met.     MRI called to confirm order was correct because it was ordered without contrast. I messaged the hospitalist and she confirmed that the patient needed mri with contrast, order changed mri completed.

## 2025-04-20 PROBLEM — N17.9 ACUTE KIDNEY INJURY: Status: ACTIVE | Noted: 2025-04-09

## 2025-04-20 PROBLEM — E83.39 HYPERPHOSPHATEMIA: Status: ACTIVE | Noted: 2025-04-20

## 2025-04-20 PROBLEM — T45.1X5A TUMOR LYSIS SYNDROME FOLLOWING ANTINEOPLASTIC DRUG THERAPY (HCC): Status: ACTIVE | Noted: 2025-04-20

## 2025-04-20 PROBLEM — T45.1X5A TUMOR LYSIS SYNDROME FOLLOWING ANTINEOPLASTIC DRUG THERAPY (HCC): Status: ACTIVE | Noted: 2025-01-01

## 2025-04-20 PROBLEM — E88.3 TUMOR LYSIS SYNDROME FOLLOWING ANTINEOPLASTIC DRUG THERAPY (HCC): Status: ACTIVE | Noted: 2025-04-20

## 2025-04-20 PROBLEM — E88.3 TUMOR LYSIS SYNDROME FOLLOWING ANTINEOPLASTIC DRUG THERAPY (HCC): Status: ACTIVE | Noted: 2025-01-01

## 2025-04-20 PROBLEM — N17.9 ACUTE KIDNEY INJURY: Status: ACTIVE | Noted: 2025-01-01

## 2025-04-20 PROBLEM — E87.5 HYPERKALEMIA: Status: ACTIVE | Noted: 2025-01-01

## 2025-04-20 PROBLEM — E83.39 HYPERPHOSPHATEMIA: Status: ACTIVE | Noted: 2025-01-01

## 2025-04-20 PROBLEM — E87.5 HYPERKALEMIA: Status: ACTIVE | Noted: 2025-04-20

## 2025-04-20 NOTE — PROGRESS NOTES
[facility]   part of [facility]     Hospitalist Progress Note     Lencho Torres Patient Status:  Inpatient    1955 MRN [id number]   [facility] 4NW-A Attending Paul Yao, DO   Hosp Day # 2 PCP Shayan Harris MD     Chief Complaint: Direct admission for chemotherapy    Subjective:     Patient resting in bed and reports feeling better than yesterday.  He tolerated chemotherapy well yesterday    Objective:    Review of Systems:   A comprehensive review of systems was completed; pertinent positive and negatives stated in subjective.    Vital signs:  Temp:  [97.6 °F (36.4 °C)-98.3 °F (36.8 °C)] 97.7 °F (36.5 °C)  Pulse:  [] 83  Resp:  [16-18] 16  BP: (109-138)/(66-86) 109/66  SpO2:  [91 %-96 %] 93 %    Physical Exam:    General: No acute distress  Respiratory: No wheezes, no rhonchi  Cardiovascular: S1, S2, regular rate and rhythm  Abdomen: Soft, Non-tender, non-distended, positive bowel sounds  Neuro: No new focal deficits.   Extremities: No edema    Diagnostic Data:    Labs:  Recent Labs   Lab 25  1653 25  0549 25  0511   WBC 10.2 11.1* 11.3*   HGB 15.4 14.8 14.0   MCV 91.2 89.7 92.4   .0 218.0 174.0   BAND  --   --  5   INR  --  1.12  --        Recent Labs   Lab 25  0549 25  1211 25  0511 25  0633   GLU 93 105* 152*  --    BUN 44* 44* 41*  --    CREATSERUM 1.47* 1.27 1.29  --    CA 11.2* 10.7* 9.7  --    ALB 3.9 3.8 3.7  --    * 136 135*  --    K 4.8 4.4 6.9* 6.9*   CL 92* 94* 99  --    CO2 25.0 25.0 21.0  --    ALKPHO 90 97 106  --    AST 71* 77* 67*  --    ALT 27 28 27  --    BILT 0.7 0.8 0.4  --    TP 6.0 5.9 5.8  --        Estimated Glomerular Filtration Rate: 60 mL/min/1.73m2 (result from lab).    No results for input(s): \"TROP\", \"TROPHS\", \"CK\" in the last 168 hours.    Recent Labs   Lab 25  0549   PTP 14.5   INR 1.12                  Microbiology    No results found for this visit on 25.      Imaging: Reviewed  in Epic.    Medications: Scheduled Medications[1]    Assessment & Plan:      #Diffuse large B cell lymphoma  -acyclovir, allopurinol, steroids  -MRI brain shows no metastatic disease, high FLAIR signal abnormality in left frontal lobe  -S/p LP on 4/19 prior to starting chemo  -CSF labs pending  -Started cycle 1 R-CHOP chemotherapy on 4/19  -Plan for rituximab today  -oncology following    #Tumor lysis syndrome  #Hyperkalemia  #Hyperphosphatemia  -S/p rasburicase on 4/18 and 4/19  -Elevated uric acid and creatinine level improved  -IV fluids  -Allopurinol  -Calcium gluconate, insulin, D50, Lokelma given this morning  -Nephrology following    #LISSETT, improved  -In the setting of tumor lysis syndrome  -IV fluids    #Hypercalcemia  -In the setting of lymphoma  -Nephrology following    #Cancer-related pain  -Oxycodone as needed    #HTN  -controlled  -not on medications      Paul Yao, DO    Supplementary Documentation:     Quality:  DVT Mechanical Prophylaxis:   SCDs,    DVT Pharmacologic Prophylaxis   Medication    [Held by provider] enoxaparin (Lovenox) 40 MG/0.4ML SUBQ injection 40 mg                Code Status: Not on file  [place]row[place]: No urinary catheter in place  [place] Duration (in days):   Central line:    KAY:     Discharge is dependent on: Clinical improvement  At this point Mr. Torres is expected to be discharge to: Home    The 21st Century Cures Act makes medical notes like these available to patients in the interest of transparency. Please be advised this is a medical document. Medical documents are intended to carry relevant information, facts as evident, and the clinical opinion of the practitioner. The medical note is intended as peer to peer communication and may appear blunt or direct. It is written in medical language and may contain abbreviations or verbiage that are unfamiliar.     Dietitian Malnutrition Assessment    Evaluation for Malnutrition:                       RD Malnutrition Care Plan:      Body  Fat/Muscle Mass:          Physician Assessment     Patient has a diagnosis of moderate malnutrition        **Certification      PHYSICIAN Certification of Need for Inpatient Hospitalization - Initial Certification    Patient will require inpatient services that will reasonably be expected to span two midnight's based on the clinical documentation in H+P.   Based on patients current state of illness, I anticipate that, after discharge, patient will require TBD.           [1]    sodium zirconium cyclosilicate  10 g Oral Q8H    allopurinol  300 mg Oral BID    acetaminophen  650 mg Oral Q24H    diphenhydrAMINE  25 mg Oral Q24H    riTUXimab-abbs (Truxima) 700 mg in sodium chloride 0.9% 570 mL infusion  375 mg/m2 Intravenous Q24H    acyclovir  400 mg Oral BID    predniSONE  100 mg Oral Daily with breakfast    [Held by provider] enoxaparin  40 mg Subcutaneous Daily

## 2025-04-20 NOTE — CONSULTS
[facility]  Report of Consultation    Lencho Torres Patient Status:  Inpatient    1955 MRN [id number]   Location [facility] 4NW-A Attending Paul Yao, DO   Hosp Day # 2 PCP Shayan Harris MD     Reason for Consultation:  Hyperkalemia, hyperphos    History of Present Illness:  Lencho Torres is a 69 year old male with history of DLBCL and HTN who presented as direct admission for cycle 1 of R-CHOP. Nephrology consulted for TLS.     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 Due to high risk of TLS, he was admitted for first cycle. Noted to have elevated uric acid and was given rasburicase x2 with improvement in uric acid. Serum creatinine baseline ~0.8-0.9 mg/dL, up to 1.52 mg/dL on arrival. Renal function has been slowly improving. Received vincristine, cytoxan and doxorubicin yesterday. Has had intermittent low grade hyperkalemia in the past but K had been well-controlled prior to this AM when it was noted to be 6.9. Phos elevated to 11.3. Had been on D5W-0.9NS prior to initiation of chemo.    History:  Past Medical History[1]  Past Surgical History[2]  Family History[3]  Denies family history of kidney disease.    reports that he quit smoking about 39 years ago. His smoking use included cigarettes. He started smoking about 62 years ago. He has a 22 pack-year smoking history. He has never used smokeless tobacco. He reports current alcohol use. He reports that he does not use drugs.    Allergies:  Allergies[4]    Medications:  Current Hospital Medications[5]  Prior to Admission Medications[6]    Review of Systems:  Please see HPI for pertinent positives. 10 point review of systems otherwise reviewed and negative.     Physical Exam:  /69 (BP Location: Left arm)   Pulse 102   Temp 97.9 °F (36.6 °C) (Oral)   Resp 16   Wt 168 lb 3.2 oz (76.3 kg)   SpO2 92%   BMI 22.81 kg/m²   Temp (24hrs), Av.9 °F (36.6 °C), Min:97.7 °F  (36.5 °C), Max:98.3 °F (36.8 °C)       Intake/Output Summary (Last 24 hours) at 4/20/2025 1234  Last data filed at 4/20/2025 0800  Gross per 24 hour   Intake 6051 ml   Output 1145 ml   Net 4906 ml     Wt Readings from Last 3 Encounters:   04/20/25 168 lb 3.2 oz (76.3 kg)   04/17/25 166 lb 3.2 oz (75.4 kg)   04/09/25 178 lb 9.6 oz (81 kg)     General: awake, alert  HEENT: No scleral icterus, MMM  Neck: Supple, no DULCE or thyromegaly  Cardiac: Tachycardia, regular rhythm, S1, S2 normal  Lungs: Clear to auscultation  Abdomen: Soft, non-tender.   Extremities: Without clubbing, cyanosis; no edema  Neurologic: Cranial nerves grossly intact, moving all extremities  Skin: Warm and dry, no rashes      Laboratory Data:    Recent Labs   Lab 04/18/25  1653 04/19/25  0549 04/20/25  0511   WBC 10.2 11.1* 11.3*   HGB 15.4 14.8 14.0   MCV 91.2 89.7 92.4   .0 218.0 174.0   BAND  --   --  5   INR  --  1.12  --        Recent Labs   Lab 04/18/25  1625 04/18/25  2253 04/19/25  0549 04/19/25  1211 04/20/25  0511 04/20/25  0633   * 131* 134* 136 135*  --    K 5.1 5.3* 4.8 4.4 6.9* 6.9*   CL 88* 90* 92* 94* 99  --    CO2 21.0 23.0 25.0 25.0 21.0  --    BUN 49* 48* 44* 44* 41*  --    CREATSERUM 1.61* 1.54* 1.47* 1.27 1.29  --    CA 11.8* 11.0* 11.2* 10.7* 9.7  --    MG 2.6 2.4 2.4  --  2.4  --    PHOS 5.3* 4.3 3.4 3.0 10.6* 11.3*   GLU 61* 87 93 105* 152*  --        Recent Labs   Lab 04/18/25  1625 04/18/25  2253 04/19/25  0549 04/19/25  1211 04/20/25  0511   ALT 28 27 27 28 27   AST 58* 58* 71* 77* 67*   ALB 4.2 3.9 3.9 3.8 3.7   LDH 2,315* 2,514* 3,279* 3,556* 3,553*       Recent Labs   Lab 04/18/25  1807 04/20/25  0707 04/20/25  0907 04/20/25  1008 04/20/25  1120   PGLU 89 159* 140* 173* 197*       Imaging:  All imaging studies reviewed.    Assessment / Plan:    1) Hyperkalemia: Due to severe TLS. Was admitted for chemo initiation in setting of newly diagnosed aggressive DLBCL with high tumor burden. Received chemo yesterday  and now with significant hyperkalemia. Had been on D5W-0.9NS prior to chemo initiation, needs aggressive hydration - switched to 0.9NS. Medical management with lokelma, calcium gluconate, insulin/glucose ordered. Continue to follow closely given high risk of worsening hyperkalemia especially given plan for rituxan today per heme. Risk of worsening TLS present so will need to follow labs closely. Discussed that if K continues to rise and is refractory to medical management, may need dialysis.     2) Hyperphos: 2/2 above. Start aggressive hydration and phos binder ordered. Follow closely    3) DLBCL: admitted for R-CHOP, received vincristine, cytoxan and doxorubicin 4/19. Rituxan given this AM per heme.    4) Hyperuricemia: 2/2 TLS. S/p rasburicase x2. Improved.     5) Acute kidney injury: baseline serum creatinine ~0.8-0.9 mg/dL. Worsening serum creatinine 2/2 likely TLS and hypercalcemia. Had been slowly improving. Continue aggressive hydration.    Thank you for allowing me to participate in this patient's care. Please feel free to call me with any questions or concerns.     Terese Yao MD  04/20/25         [1]   Past Medical History:   Hx of lymphoma, non-Hodgkins    CUtaneous, b cell (venogopal)    Unspecified essential hypertension   [2]   Past Surgical History:  Procedure Laterality Date    Other surgical history  '12    [place] tooth    Other surgical history Right     lasik   [3]   Family History  Problem Relation Age of Onset    Hypertension Brother         half brother    Lipids Brother     Stroke Mother    [4]   Allergies  Allergen Reactions    Ibuprofen WHEEZING   [5]   Current Facility-Administered Medications:     sodium chloride 0.9% infusion, , Intravenous, Continuous    sodium zirconium cyclosilicate (Lokelma) oral packet 10 g, 10 g, Oral, Q8H    allopurinol (Zyloprim) tab 300 mg, 300 mg, Oral, BID    acetaminophen (Tylenol) tab 650 mg, 650 mg, Oral, Once PRN    diphenhydrAMINE (Benadryl) 50 mg/mL   injection 50 mg, 50 mg, Intravenous, Once PRN    riTUXimab-abbs (Truxima) 700 mg in sodium chloride 0.9% 570 mL infusion, 375 mg/m2, Intravenous, Q24H    prochlorperazine (Compazine) 10 MG/2ML injection 10 mg, 10 mg, Intravenous, Q6H PRN    methylPREDNISolone sodium succinate (Solu-MEDROL) injection 125 mg, 125 mg, Intravenous, Once PRN    EPINEPHrine (Adrenalin) 1 MG/ML injection (Allergic Reaction Kit) 0.3 mg, 0.3 mg, Intramuscular, Once PRN    acyclovir (Zovirax) tab 400 mg, 400 mg, Oral, BID    predniSONE (Deltasone) tab 100 mg, 100 mg, Oral, Daily with breakfast    melatonin tab 3 mg, 3 mg, Oral, Nightly PRN    ondansetron (Zofran) 4 MG/2ML injection 4 mg, 4 mg, Intravenous, Q6H PRN    [Held by provider] enoxaparin (Lovenox) 40 MG/0.4ML SUBQ injection 40 mg, 40 mg, Subcutaneous, Daily    polyethylene glycol (PEG 3350) (Miralax) 17 g oral packet 17 g, 17 g, Oral, Daily PRN    sennosides (Senokot) tab 17.2 mg, 17.2 mg, Oral, Nightly PRN    bisacodyl (Dulcolax) 10 MG rectal suppository 10 mg, 10 mg, Rectal, Daily PRN    fleet enema (Fleet) rectal enema 133 mL, 1 enema, Rectal, Once PRN    HYDROcodone-acetaminophen (Norco) 5-325 MG per tab 1 tablet, 1 tablet, Oral, Q6H PRN    oxyCODONE immediate release tab 5 mg, 5 mg, Oral, Q4H PRN    dextrose 5%-sodium chloride 0.9% infusion, , Intravenous, Continuous  [6]   No current outpatient medications on file.

## 2025-04-20 NOTE — PROGRESS NOTES
Hematology/Oncology Progress Note    Patient Name: Lencho Torres  Medical Record Number: [id number]    YOB: 1955     Reason for Consultation:  Lencho Torres was seen today for the diagnosis of DLBCL, tumor lysis syndrome, LISSETT    Interval events: Patient tolerated chemotherapy well yesterday, reports feeling the best he has felt in a long time this morning.  Pain is less.  No nausea or vomiting.  Has developed hyperphosphatemia and hyperkalemia related tumor lysis syndrome this morning but his uric acid level and creatinine are improved.      Inpatient Meds:  Scheduled Medications[1]  Medication Infusions[2]  PRN Meds:  PRN Medications[3]  Allergies:   Allergies[4]    Vital Signs:  Height: --  Weight: 76.3 kg (168 lb 3.2 oz) (04/20 0500)  BSA (Calculated - sq m): --  Pulse: 83 (04/20 0500)  BP: 109/66 (04/20 0500)  Temp: 97.7 °F (36.5 °C) (04/20 0500)  Do Not Use - Resp Rate: --  SpO2: 93 % (04/20 0500)  Wt Readings from Last 6 Encounters:   04/20/25 76.3 kg (168 lb 3.2 oz)   04/17/25 75.4 kg (166 lb 3.2 oz)   04/09/25 81 kg (178 lb 9.6 oz)   04/08/25 80 kg (176 lb 6.4 oz)   09/01/22 79 kg (174 lb 3.2 oz)   02/07/22 83 kg (183 lb)     Physical Examination:  General: Patient is alert and oriented, not in acute distress  CV: Regular rate and rhythm, no murmurs, no LE edema  Respiratory: Lungs clear to auscultation bilaterally  GI/Abd: Soft, +mild tenderness.   Skin: no rashes or petechiae  Lines: +PICC    Laboratory:  Recent Labs   Lab 04/18/25  1653 04/19/25  0549 04/20/25  0511   WBC 10.2 11.1* 11.3*   HGB 15.4 14.8 14.0   HCT 45.5 42.8 41.3   .0 218.0 174.0   MCV 91.2 89.7 92.4   RDW 11.9 11.9 11.9   NEPRELIM 7.61 8.84* 9.53*     Recent Labs   Lab 04/19/25  0549 04/19/25  1211 04/20/25  0511 04/20/25  0633   * 136 135*  --    K 4.8 4.4 6.9* 6.9*   CL 92* 94* 99  --    CO2 25.0 25.0 21.0  --    BUN 44* 44* 41*  --    CREATSERUM 1.47* 1.27 1.29  --    GLU 93 105* 152*  --    CA 11.2* 10.7* 9.7  --     PHOS 3.4 3.0 10.6* 11.3*   TP 6.0 5.9 5.8  --    ALB 3.9 3.8 3.7  --    ALKPHO 90 97 106  --    AST 71* 77* 67*  --    ALT 27 28 27  --    BILT 0.7 0.8 0.4  --      Recent Labs   Lab 04/19/25  0549   INR 1.12     Imaging:    PET/CT 4/18: CONCLUSION:     1. Widespread, intensely hypermetabolic disease involving bone marrow, pleura, omentum, peritoneum, mesentery, and multiple lymph nodes of the neck and thorax concordant with biopsy-proven diffuse large B-cell lymphoma.    2. Focal hypermetabolism appears localized intracranially within left posterior fossa suspicious for either dural or intraparenchymal metastasis.  This may be better assessed with brain MRI.     MRI brain 4/19: CONCLUSION:     1. No evidence of parenchymal metastatic disease.    2. The 8 mm high FLAIR signal abnormality in the left frontal lobe subcortical white matter the demonstrates likely T2 shine through on diffusion-weighted images is likely sequelae of a subacute/chronic infarct.  There is volume loss in this region.    This is highly unlikely to represent a nonenhancing focus of brain metastatic disease.  However, continued follow-up as needed may be done.     Impression & Plan:     *DLBCL, arising from histologic transformation from FL  - Following with Dr. Taylor.  High tumor burden with spontaneous tumor lysis syndrome requiring inpatient chemotherapy for ongoing close monitoring and management.  - PET/CT scan suggested possible left posterior fossa CNS disease, therefore MRI brain performed which does not appear to be as suspicious for this.  S/p LP 4/19 prior to starting chemo- CSF analysis pending  - Started cycle 1 R-CHOP chemotherapy 4/19, will receive the rituximab today as inpatient.  As an outpatient, Dr. Taylor is planning to switch to R-PolaCHP.    *tumor lysis syndrome, hyperkalemia, hyperphosphatemia  - Presented with markedly elevated uric acid with renal insufficiency even prior to starting chemotherapy. S/p  rasburicase 3 mg 4/18 and 6 mg 4/19 with improvement.    - Today has developed hyperkalemia, hyperphosphatemia related to tumor lysis syndrome upon starting chemotherapy.  Will consult renal to assist in managing.  -cont allopurinol 300 mg BID.  -cont IVF  -Continue to monitor TLS labs closely    *LISSETT  - Likely related to tumor lysis syndrome, hypercalcemia  -IVF  -better.  Continue to monitor BUN/Cre closely    *Hypercalcemia  - Likely related to underlying lymphoma.  Lower with onset of hyperphosphatemia as part of TLS.  Renal is now following.  Ongoing IVF.  Monitor CMP.    *Cancer related pain  - Extensive malignant peritoneal involvement causing pain.  - Oxycodone 5mg prn    Will continue to follow, Dr. Taylor will be back to round tomorrow    Johann Dhaliwal MD  Hematology/Medical Oncology  [facility]       [1]    sodium zirconium cyclosilicate  10 g Oral Q8H    allopurinol  300 mg Oral BID    acetaminophen  650 mg Oral Q24H    diphenhydrAMINE  25 mg Oral Q24H    riTUXimab-abbs (Truxima) 700 mg in sodium chloride 0.9% 570 mL infusion  375 mg/m2 Intravenous Q24H    acyclovir  400 mg Oral BID    predniSONE  100 mg Oral Daily with breakfast    [Held by provider] enoxaparin  40 mg Subcutaneous Daily   [2]    sodium chloride 150 mL/hr at 04/20/25 0733    dextrose 5%-sodium chloride 0.9% Stopped (04/20/25 0733)   [3]   dextrose    acetaminophen    diphenhydrAMINE    prochlorperazine    methylPREDNISolone    EPINEPHrine    melatonin    ondansetron    polyethylene glycol (PEG 3350)    sennosides    bisacodyl    fleet enema    HYDROcodone-acetaminophen    oxyCODONE  [4]   Allergies  Allergen Reactions    Ibuprofen WHEEZING

## 2025-04-20 NOTE — PLAN OF CARE
Assumed care of pt at 0700.  Elevated potassium and phosphorus with this morning labs.  Nephro consulted.  Ca gluconate, inuslin and dextrose given per orders.  Blood sugars monitored every one hour per protocol til noon.  Repeat labs this afternoon.  Dr. Dhaliwal, Dr. Yao ([facility]) and Dr. Yao (hospitalist) today. Ate good breakfast today, after eating pt having complaints of abdominal pain. Declined lunch and dinner meals.  Applesauce Renvela started, given with applesauce.  Bilateral shoulder pain R>L, 7-8/10.  Oxy IR give per MAR with mild relief.  Abdomen distended, soft with active bowel sounds.  Complaints of constipation. Miralax given.  Scheduled rituxan given, tolerated treatment without incident.  Good blood return from both red and purple lumens.  Flushed well.  IVF infusing at 150cc/hr.  Updated pt re: plan of care.  Verbalizes understanding.  Meds per MAR.  Safety measures in place.     Problem: GASTROINTESTINAL - ADULT  Goal: Maintains adequate nutritional intake (undernourished)  Description: INTERVENTIONS:- Monitor percentage of each meal consumed- Identify factors contributing to decreased intake, treat as appropriate- Assist with meals as needed- Monitor I&O, WT and lab values- Obtain nutritional consult as needed- Optimize oral hygiene and moisture- Encourage food from home; allow for food preferences- Enhance eating environment  Outcome: Not Progressing     Problem: METABOLIC/FLUID AND ELECTROLYTES - ADULT  Goal: Electrolytes maintained within normal limits  Description: INTERVENTIONS:- Monitor labs and rhythm and assess patient for signs and symptoms of electrolyte imbalances- Administer electrolyte replacement as ordered- Monitor response to electrolyte replacements, including rhythm and repeat lab results as appropriate- Fluid restriction as ordered- Instruct patient on fluid and nutrition restrictions as appropriate  Outcome: Not Progressing     Problem: SKIN/TISSUE INTEGRITY - ADULT  Goal:  Incision(s), wounds(s) or drain site(s) healing without S/S of infection  Description: INTERVENTIONS:- Assess and document risk factors for pressure ulcer development- Assess and document skin integrity- Assess and document dressing/incision, wound bed, drain sites and surrounding tissue- Implement wound care per orders- Initiate isolation precautions as appropriate- Initiate Pressure Ulcer prevention bundle as indicated  Outcome: Progressing     Problem: HEMATOLOGIC - ADULT  Goal: Maintains hematologic stability  Description: INTERVENTIONS- Assess for signs and symptoms of bleeding or hemorrhage- Monitor labs and vital signs for trends- Administer supportive blood products/factors, fluids and medications as ordered and appropriate- Administer supportive blood products/factors as ordered and appropriate  Outcome: Progressing     Problem: PAIN - ADULT  Goal: Verbalizes/displays adequate comfort level or patient's stated pain goal  Description: INTERVENTIONS:- Encourage pt to monitor pain and request assistance- Assess pain using appropriate pain scale- Administer analgesics based on type and severity of pain and evaluate response- Implement non-pharmacological measures as appropriate and evaluate response- Consider cultural and social influences on pain and pain management- Manage/alleviate anxiety- Utilize distraction and/or relaxation techniques- Monitor for opioid side effects- Notify MD/LIP if interventions unsuccessful or patient reports new pain- Anticipate increased pain with activity and pre-medicate as appropriate  Outcome: Progressing

## 2025-04-20 NOTE — PLAN OF CARE
Patient is alert and oriented, ambulatory to the bathroom. Tolerated chemo well yesterday, plan for Rituximab today at 10am. PRN Oxy given x1 for pain in both shoulders and back. IVF infusing. No new complaints. Safety precautions in place, call light within reach, plan of care ongoing.     Problem: GASTROINTESTINAL - ADULT  Goal: Maintains adequate nutritional intake (undernourished)  Description: INTERVENTIONS:- Monitor percentage of each meal consumed- Identify factors contributing to decreased intake, treat as appropriate- Assist with meals as needed- Monitor I&O, WT and lab values- Obtain nutritional consult as needed- Optimize oral hygiene and moisture- Encourage food from home; allow for food preferences- Enhance eating environment  Outcome: Progressing     Problem: HEMATOLOGIC - ADULT  Goal: Maintains hematologic stability  Description: INTERVENTIONS- Assess for signs and symptoms of bleeding or hemorrhage- Monitor labs and vital signs for trends- Administer supportive blood products/factors, fluids and medications as ordered and appropriate- Administer supportive blood products/factors as ordered and appropriate  Outcome: Progressing     Problem: PAIN - ADULT  Goal: Verbalizes/displays adequate comfort level or patient's stated pain goal  Description: INTERVENTIONS:- Encourage pt to monitor pain and request assistance- Assess pain using appropriate pain scale- Administer analgesics based on type and severity of pain and evaluate response- Implement non-pharmacological measures as appropriate and evaluate response- Consider cultural and social influences on pain and pain management- Manage/alleviate anxiety- Utilize distraction and/or relaxation techniques- Monitor for opioid side effects- Notify MD/LIP if interventions unsuccessful or patient reports new pain- Anticipate increased pain with activity and pre-medicate as appropriate  Outcome: Progressing

## 2025-04-20 NOTE — PLAN OF CARE
Patient c/o moderate pain on right shoulder and abdomen. Patient's abdomen distended, soft, bowel sounds hypoactive. Patient with poor appetite, Lumbar puncture done in IR, procedure tolerated well, puncture site dressing clean, dry and intact. Patient denies headache, no active bleeding noted from LP site. Chemo started, Doxorubicin,  Vincristine and Cytoxan given this afternoon, tolerated well, no adverse reaction noted., PICC line with good blood return. Patient's vital signs stable. Oxycodone given as needed, with mild relief.   Problem: GASTROINTESTINAL - ADULT  Goal: Maintains adequate nutritional intake (undernourished)  Description: INTERVENTIONS:- Monitor percentage of each meal consumed- Identify factors contributing to decreased intake, treat as appropriate- Assist with meals as needed- Monitor I&O, WT and lab values- Obtain nutritional consult as needed- Optimize oral hygiene and moisture- Encourage food from home; allow for food preferences- Enhance eating environment  Outcome: Not Progressing     Problem: METABOLIC/FLUID AND ELECTROLYTES - ADULT  Goal: Electrolytes maintained within normal limits  Description: INTERVENTIONS:- Monitor labs and rhythm and assess patient for signs and symptoms of electrolyte imbalances- Administer electrolyte replacement as ordered- Monitor response to electrolyte replacements, including rhythm and repeat lab results as appropriate- Fluid restriction as ordered- Instruct patient on fluid and nutrition restrictions as appropriate  Outcome: Progressing     Problem: METABOLIC/FLUID AND ELECTROLYTES - ADULT  Goal: Hemodynamic stability and optimal renal function maintained  Description: INTERVENTIONS:- Monitor labs and assess for signs and symptoms of volume excess or deficit- Monitor intake, output and patient weight- Monitor urine specific gravity, serum osmolarity and serum sodium as indicated or ordered- Monitor response to interventions for patient's volume status,  including labs, urine output, blood pressure (other measures as available)- Encourage oral intake as appropriate- Instruct patient on fluid and nutrition restrictions as appropriate  Outcome: Progressing     Problem: HEMATOLOGIC - ADULT  Goal: Maintains hematologic stability  Description: INTERVENTIONS- Assess for signs and symptoms of bleeding or hemorrhage- Monitor labs and vital signs for trends- Administer supportive blood products/factors, fluids and medications as ordered and appropriate- Administer supportive blood products/factors as ordered and appropriate  Outcome: Progressing

## 2025-04-21 PROBLEM — E83.51 HYPOCALCEMIA: Status: ACTIVE | Noted: 2025-04-21

## 2025-04-21 PROBLEM — E88.3 TUMOR LYSIS SYNDROME (HCC): Status: ACTIVE | Noted: 2025-01-01

## 2025-04-21 PROBLEM — E88.3 TUMOR LYSIS SYNDROME (HCC): Status: ACTIVE | Noted: 2025-04-20

## 2025-04-21 PROBLEM — E83.51 HYPOCALCEMIA: Status: ACTIVE | Noted: 2025-01-01

## 2025-04-21 NOTE — PAYOR COMM NOTE
PLEASE GIVE RECONSIDERATION/APPROVAL TO THIS INPT ADMISSION      ADMISSION REVIEW     Payor: JESSI PARKER [facility] [place]  Subscriber #:  [id number]  Authorization Number: [id number]    Admit date: 4/18/25  Admit time: 12:33 PM       REVIEW DOCUMENTATION:  Hem/Onc Report of Consultation     Reason for Consultation: Eval and Treat DLBCL     Chief Complaint: No chief complaint on file.     Oncology/Hematology History:     2007, diagnosed with follicular lymphoma of scalp treated with 4 doses of rituximab followed by RT.  Had complete response and was on surveillance.  Recently found to have an elevated PSA.     2025 presented to the ER with worsening abdominal pain for 3 to 4 weeks.  CT showed extensive carcinomatosis with abdominal adenopathy.  Patient underwent IR guided biopsy of the peritoneal carcinomatosis That revealed diffuse large B-cell lymphoma, germinal center phenotype.     LDH elevated at 1470  Was noted to be hyponatremic, mild renal insufficiency with hypercalcemia on admission last week. CBC  Reveals normal blood counts, mild monocytosis.     April 18, 2025- Direct admission for Cycle one of R-[place]-CHOP ([place] omitted cycle one to begin with cycle 2 OP)    History of Present Illness     Lencho Torres is a 69 year old male with the above oncology history under the kind care of Dr. Charlene Taylor for a new diagnosis of diffuse large B cell lymphoma who presents for a direct admission to receive cycle 1 of R-chop (polatuzimab to be given outpatient).      Patient was seen in clinic yesterday by Dr. Taylor and is here today for treatment given high risk for tumor lysis syndrome. Patient presented shortly after completing PET scan, no acute complaints. Doing ok overall.      Impression/Plan     DLBCL, GCB subtype                 - Hx of follicular lymphoma to scalp; DLBCL possible transformed from FL                - Given aggressive nature, concern for double hit, FISH panel is in process                - PET scan today  raises concern for dural or parenchymal involvement of DLBCL                              * MRI brain now to further assess                               * Diagnostic LP for cytology and cell count tomorrow, will need PT/INR, CBC in am; HOLD LOVENOX for LP                - Due to high IPI, treatment with Rob-RCHP recommended                - STAT PICC line placement now                - ECHO completed 4/10/25, EF 65-70%, Hep C, HIV negative                - Rasburicase administration this evening with plan to transition to allopurinal pending response                - Begin CHP portion first the morning of 4/19/25 followed by rituximab (patient high risk for rituximab reaction); provided dedicated chemoteaching and obtained consent                - Prophylaxis with acyclovir  mg BID     High Risk for Tumor Lysis Syndrome                - Rasburicase 3 mg IV now, follow uric acid, LDH, Mg,Phos, CMP, now then q6 hours for 4 occurrences, additional timing to be determined thereafter                - If uric acid remains elevated after 24 hours, can consider an additional dose of rasburicase 3 mg     Renal Insufficiency                - Multifactorial due to decreased PO intake, elevated uric acid, hypercalcemia    History and Physical   Chief Complaint: direct admission for chemotherapy    History of Present Illness:   69 year old male with pmhx of diffuse large B cell lymphoma, HTN who is directly admitted for chemotherapy infusion therapy and monitoring. He reports he is at his baseline. No chest pain/pressure, SOB/SWAN, LE swelling, fevers/chills, n/v/d, cough/congestion, URI/flu-like symptoms, recent sick contacts. He has some chronic abdominal pain and swelling, but he reports no new symptoms or change in his symptoms.     Lab 04/17/25  1436   GLU 71   BUN 37*   CREATSERUM 1.52*   EGFRCR 49*   CA 12.1*   ALB 4.5   *   K 5.2*   CL 91*   CO2 22.0   ALKPHO 99   AST 61*   ALT 28   BILT 0.8   TP 7.0      Assessment & Plan:  #Diffuse large B cell lymphoma  -admit for inpatient chemotherapy infusion and close monitoring  -acyclovir, allopurinol, steroids  -MRI brain ordered  -CBC, CMP ordered  -follow CMP, LDH, uric acid, phos, mg q6h  -oncology consulted    #HTN  -controlled  -not on medications    4/19/2025  Hematology/Oncology Progress Note   reports pain is adequately controlled with oxycodone at this time. Awaiting lumbar puncture this morning to evaluate for possible CNS disease after PET scan showed possible concern for this as below. No neurologic changes.     Lab 04/18/25  1653 04/19/25  0549   WBC 10.2 11.1*   HGB 15.4 14.8   HCT 45.5 42.8   .0 218.0   MCV 91.2 89.7   RDW 11.9 11.9   NEPRELIM 7.61 8.84*      Lab 04/18/25  1625 04/18/25  2253 04/19/25  0549   * 131* 134*   K 5.1 5.3* 4.8   CL 88* 90* 92*   CO2 21.0 23.0 25.0   BUN 49* 48* 44*   CREATSERUM 1.61* 1.54* 1.47*   GLU 61* 87 93   CA 11.8* 11.0* 11.2*   PHOS 5.3* 4.3 3.4   TP 6.4 6.1 6.0   ALB 4.2 3.9 3.9   ALKPHO 91 92 90   AST 58* 58* 71*   ALT 28 27 27   BILT 0.9 0.6 0.7     Lab 04/19/25  0549   INR 1.12     PET/CT 4/18: CONCLUSION:     1. Widespread, intensely hypermetabolic disease involving bone marrow, pleura, omentum, peritoneum, mesentery, and multiple lymph nodes of the neck and thorax concordant with biopsy-proven diffuse large B-cell lymphoma.    2. Focal hypermetabolism appears localized intracranially within left posterior fossa suspicious for either dural or intraparenchymal metastasis.  This may be better assessed with brain MRI.      MRI brain 4/19: CONCLUSION:     1. No evidence of parenchymal metastatic disease.    2. The 8 mm high FLAIR signal abnormality in the left frontal lobe subcortical white matter the demonstrates likely T2 shine through on diffusion-weighted images is likely sequelae of a subacute/chronic infarct.  There is volume loss in this region.    This is highly unlikely to represent a nonenhancing  focus of brain metastatic disease.  However, continued follow-up as needed may be done.     Impression & Plan:   *DLBCL, arising from histologic transformation from FL  - Following with Dr. Taylor.  High tumor burden with spontaneous tumor lysis syndrome requiring inpatient chemotherapy for ongoing close monitoring and management.  - PET/CT scan suggested possible left posterior fossa CNS disease, MRI brain performed which does not appear to be as suspicious for this though patient to undergo LP today for CSF analysis to further evaluate.  - Following LP today, patient is to start cycle 1 R-CHOP chemotherapy today as inpatient.  Rituximab can be delayed until tomorrow.  As an outpatient, Dr. Taylor is planning to switch to R-PolaCHP.     *tumor lysis syndrome  - Markedly elevated uric acid with renal insufficiency even prior to starting chemotherapy. S/p rasburicase 3 mg yesterday with minimal improvement in uric acid.  Repeat labs this afternoon and if not further improved will give another dose of rasburicase and consider increasing allopurinol to 300 mg BID.  -IVF  -Continue to monitor TLS labs closely     *LISSETT  - Likely related to tumor lysis syndrome, hypercalcemia  -IVF  - Monitor BUN/Cre closely     *Hypercalcemia  - Likely related to underlying lymphoma.  Anticipate improvement with response to treatment.  Ongoing IVF.  Monitor CMP.     *Cancer related pain  - Extensive malignant peritoneal involvement causing pain.  - Oxycodone 5mg prn    4/20/2025  Nephrology   Reason for Consultation:  Hyperkalemia, hyperphos     History of Present Illness:  69 year old male with history of DLBCL and HTN who presented as direct admission for cycle 1 of R-CHOP. Nephrology consulted for TLS.      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 Due to high risk of TLS, he was admitted for first cycle. Noted to have elevated uric acid and was given  rasburicase x2 with improvement in uric acid. Serum creatinine baseline ~0.8-0.9 mg/dL, up to 1.52 mg/dL on arrival. Renal function has been slowly improving. Received vincristine, cytoxan and doxorubicin yesterday. Has had intermittent low grade hyperkalemia in the past but K had been well-controlled prior to this AM when it was noted to be 6.9. Phos elevated to 11.3. Had been on D5W-0.9NS prior to initiation of chemo.    4/20/2025 0800      Gross per 24 hour   Intake 6051 ml   Output 1145 ml   Net 4906 ml     Lab 04/18/25  1653 04/19/25  0549 04/20/25  0511   WBC 10.2 11.1* 11.3*   HGB 15.4 14.8 14.0   MCV 91.2 89.7 92.4   .0 218.0 174.0   BAND  --   --  5   INR  --  1.12  --      Lab 04/18/25  1625 04/18/25  2253 04/19/25  0549 04/19/25  1211 04/20/25  0511 04/20/25  0633   * 131* 134* 136 135*  --    K 5.1 5.3* 4.8 4.4 6.9* 6.9*   CL 88* 90* 92* 94* 99  --    CO2 21.0 23.0 25.0 25.0 21.0  --    BUN 49* 48* 44* 44* 41*  --    CREATSERUM 1.61* 1.54* 1.47* 1.27 1.29  --    CA 11.8* 11.0* 11.2* 10.7* 9.7  --    MG 2.6 2.4 2.4  --  2.4  --    PHOS 5.3* 4.3 3.4 3.0 10.6* 11.3*   GLU 61* 87 93 105* 152*  --       Lab 04/18/25  1625 04/18/25  2253 04/19/25  0549 04/19/25  1211 04/20/25  0511   ALT 28 27 27 28 27   AST 58* 58* 71* 77* 67*   ALB 4.2 3.9 3.9 3.8 3.7   LDH 2,315* 2,514* 3,279* 3,556* 3,553*      Lab 04/18/25  1807 04/20/25  0707 04/20/25  0907 04/20/25  1008 04/20/25  1120   PGLU 89 159* 140* 173* 197*     Assessment / Plan:     1) Hyperkalemia: Due to severe TLS. Was admitted for chemo initiation in setting of newly diagnosed aggressive DLBCL with high tumor burden. Received chemo yesterday and now with significant hyperkalemia. Had been on D5W-0.9NS prior to chemo initiation, needs aggressive hydration - switched to 0.9NS. Medical management with lokelma, calcium gluconate, insulin/glucose ordered. Continue to follow closely given high risk of worsening hyperkalemia especially given plan for  rituxan today per heme. Risk of worsening TLS present so will need to follow labs closely. Discussed that if K continues to rise and is refractory to medical management, may need dialysis.      2) Hyperphos: 2/2 above. Start aggressive hydration and phos binder ordered. Follow closely     3) DLBCL: admitted for R-CHOP, received vincristine, cytoxan and doxorubicin 4/19. Rituxan given this AM per heme.     4) Hyperuricemia: 2/2 TLS. S/p rasburicase x2. Improved.      5) Acute kidney injury: baseline serum creatinine ~0.8-0.9 mg/dL. Worsening serum creatinine 2/2 likely TLS and hypercalcemia. Had been slowly improving. Continue aggressive hydration.    4/21/2025  IR Procedure Note   Procedure: Temporary dialysis catheter placement     Pre-Procedure Diagnosis:  Hyperkalemia     Post-Procedure Diagnosis: Same     Anesthesia:  Local     Findings:  patent right internal jugular vein     Nephrology Progress Note   4/21/2025 0543      Gross per 24 hour   Intake 3003 ml   Output 1150 ml   Net 1853 ml     Lab 04/18/25  1653 04/19/25  0549 04/20/25  0511 04/21/25  0453   WBC 10.2 11.1* 11.3* 9.0   HGB 15.4 14.8 14.0 12.6*   MCV 91.2 89.7 92.4 92.0   .0 218.0 174.0 137.0*   BAND  --   --  5  --    INR  --  1.12  --   --      Lab 04/18/25  1625 04/18/25  2253 04/19/25  0549 04/19/25  1211 04/20/25  0511 04/20/25  0633 04/20/25  1401 04/20/25  2129 04/21/25  0453   * 131* 134* 136 135*  --  142 138 140   K 5.1 5.3* 4.8 4.4 6.9* 6.9* 4.7 6.0* 5.8*   CL 88* 90* 92* 94* 99  --  106 101 102   CO2 21.0 23.0 25.0 25.0 21.0  --  19.0* 20.0* 20.0*   BUN 49* 48* 44* 44* 41*  --  58* 80* 77*   CREATSERUM 1.61* 1.54* 1.47* 1.27 1.29  --  1.02 1.34* 1.42*   CA 11.8* 11.0* 11.2* 10.7* 9.7  --  7.6* 7.6* 6.9*   MG 2.6 2.4 2.4  --  2.4  --   --   --   --    PHOS 5.3* 4.3 3.4 3.0 10.6* 11.3* 10.4*  --  17.4*   GLU 61* 87 93 105* 152*  --  149* 226* 224*      Lab 04/18/25  2253 04/19/25  0549 04/19/25  1211 04/20/25  0511  04/21/25  0453   ALT 27 27 28 27 63*   AST 58* 71* 77* 67* 377*   ALB 3.9 3.9 3.8 3.7 3.4   LDH 2,514* 3,279* 3,556* 3,553* >4,500*      Lab 04/18/25  1807 04/20/25  0707 04/20/25  0907 04/20/25  1008 04/20/25  1120   PGLU 89 159* 140* 173* 197*     Impression/Plan:  1) Hyperkalemia: Due to severe TLS. Was admitted for chemo initiation in setting of newly diagnosed aggressive DLBCL with high tumor burden. Received chemo yesterday and now with significant hyperkalemia. Had been on D5W-0.9NS prior to chemo initiation, needs aggressive hydration - switched to 0.9NS. Medical management with lokelma, calcium gluconate, insulin/glucose ordered. Continue to follow closely given high risk of worsening hyperkalemia especially given plan for rituxan per heme. Risk of worsening TLS present so will need to follow labs closely. K remains up but better.  That being said will plan HD today     2) Hyperphos: 2/2 above. Start aggressive hydration and phos binder ordered. Phos markedly higher today and although phos generally has poor clearance with HD will dialyze today in part due to worsening ca level (as well as persistently high K) .  That being said would expect some degree of phos clearance     3) DLBCL: admitted for R-CHOP, received vincristine, cytoxan and doxorubicin 4/19. Rituxan given this AM per heme.     4) Hyperuricemia: 2/2 TLS. S/p rasburicase x2. Improved.      5) Acute kidney injury: baseline serum creatinine ~0.8-0.9 mg/dL. Worsening serum creatinine 2/2 likely TLS and hypercalcemia. Relatively stable today.  HD not for LISSETT. Continue aggressive hydration.     MEDICATIONS ADMINISTERED:  Medications 04/18/25 04/19/25 04/20/25 04/21/25   acetaminophen (Tylenol) tab 650 mg  Dose: 650 mg  Freq: Every 24 hours Route: OR  Start: 04/20/25 0930 End: 04/20/25 1004   Admin Instructions:   Give 30 minutes before rituximab      1004 EL-Given          acyclovir (Zovirax) tab 400 mg  Dose: 400 mg  Freq: 2 times daily Route:  OR  Start: 04/18/25 1015   Order specific questions:       1542 AP-Given   2012 KK-Given      1140 AJ-Given   2032 KK-Given      1003 EL-Given   2124 SM-Given      0820 AF-Given   2100        allopurinol (Zyloprim) tab 300 mg  Dose: 300 mg  Freq: 2 times daily Route: OR  Start: 04/19/25 2100 2032 KK-Given       1003 EL-Given   2124 SM-Given      0821 AF-Given   2100        allopurinol (Zyloprim) tab 300 mg  Dose: 300 mg  Freq: Daily Route: OR  Start: 04/19/25 0930 End: 04/19/25 1144 1139 AJ-Given   1144-D/C'd        calcium gluconate 1g in 100mL iso-NaCl IVPB premix  Dose: 1 g  Freq: Once Route: IV  Last Dose: 1 g (04/20/25 0738)  Start: 04/20/25 0715 End: 04/20/25 0748   Admin Instructions:   Do not administer if patient is on Digoxin      0738 KK-New Bag          cyclophosphamide (Cytoxan) 1,400 mg in sodium chloride 0.9% 257 mL infusion  Dose: 750 mg/m2  Weight Dosing Info: 66.8 kg  Freq: Every 24 hours Route: IV  Last Dose: 1,400 mg (04/19/25 1742)  Start: 04/19/25 1530 End: 04/19/25 1812   Admin Instructions:   CAUTION: CHEMOTHERAPY     1742 AJ/AF-New Bag           dextrose 50% injection  mL  Dose:  mL  Freq: Once Route: IV  Start: 04/20/25 0715 End: 04/20/25 0755   Admin Instructions:   If blood glucose < 100, give 2 amps (100mL) D50 with IV insulin;  If blood glucose 100-250, give 1 amp (50 mL) D50 with IV insulin;  If blood glucose > 250, notify provider.      0755 KK-Given          diphenhydrAMINE (Benadryl) cap/tab 25 mg  Dose: 25 mg  Freq: Every 24 hours Route: OR  Start: 04/20/25 0930 End: 04/20/25 1003   Admin Instructions:   Give 30 minutes before rituximab      1003 EL-Given          DOXOrubicin (Adriamycin) 2 mg/mL IV syringe 94 mg 47 mL  Dose: 50 mg/m2  Weight Dosing Info: 66.8 kg  Freq: Every 24 hours Route: IV  Start: 04/19/25 1500 End: 04/19/25 1701   Admin Instructions:   CAUTION: CHEMOTHERAPY. Administer slowly into freely running IV of NS D5W over not less than 3 to 5  minutes.     1651 AJ/MK-New Bag           fosaprepitant (Emend) 150 mg in sodium chloride 0.9% 150 mL IVPB  Dose: 150 mg  Freq: Every 24 hours Route: IV  Last Dose: 150 mg (04/19/25 1532)  Start: 04/19/25 1430 End: 04/19/25 1552   Admin Instructions:   Infusion to be completed 30 minutes prior to chemotherapy.     1532 AJ-New Bag           heparin (Porcine) 1000 UNIT/ML injection 1,500 Units  Dose: 1.5 mL  Freq: Once (expires in 48 hrs) Route: IF  Start: 04/21/25 0915 End: 04/23/25 0915   Admin Instructions:   Instill 1.5 ml in each port unless otherwise indicated on catheter       1900-AF         heparin (Porcine) 5000 UNIT/ML injection  Start: 04/21/25 0829 End: 04/21/25 0829   Admin Instructions:   Carlitos Rodríguez: cabinet override          heparin (Porcine) 5000 UNIT/ML injection  Start: 04/21/25 0820 End: 04/21/25 0820   Admin Instructions:   Carlitos Rodríguez: cabinet override          insulin regular human (Novolin R, Humulin R) 100 UNIT/ML injection 10 Units  Dose: 10 Units  Freq: Once Route: IV  Start: 04/20/25 0715 End: 04/20/25 0755      0755 KK/TM-Given          lidocaine (Xylocaine) 1 % injection  Start: 04/21/25 0820 End: 04/21/25 0820   Admin Instructions:   Carlitos Rodríguez: cabinet override          lidocaine PF (Xylocaine-MPF) 1% injection  Dose: 5 mL  Freq: Once (expires in 24 hrs) Route: ID  Start: 04/18/25 1345 End: 04/18/25 1407   Admin Instructions:   May use up to 5ml of 1% Lidocaine (without epinephrine) injected intradermally unless contraindicated for PICC placement    1407 LW-Given            ondansetron (Zofran) 16 mg in sodium chloride 0.9% 108 mL IVPB  Dose: 16 mg  Freq: Every 24 hours Route: IV  Last Dose: 16 mg (04/19/25 1606)  Start: 04/19/25 1430 End: 04/19/25 1621   Admin Instructions:   Infusion to be completed 30 minutes prior to chemotherapy.     1606 AJ-New Bag           predniSONE (Deltasone) tab 100 mg  Dose: 100 mg  Freq: Daily with breakfast Route: OR  Start: 04/19/25 0800  End: 04/19/25 1141     1141 AJ-Given           predniSONE (Deltasone) tab 100 mg  Dose: 100 mg  Freq: Daily with breakfast Route: OR  Start: 04/20/25 0800 End: 04/24/25 0759      1004 EL-Given       0820 AF-Given         rasburicase 3 mg in sodium chloride 0.9% 50 mL IVPB  Dose: 3 mg  Freq: Every 24 hours Route: IV  Last Dose: 3 mg (04/18/25 1801)  Start: 04/18/25 1800 End: 04/18/25 1831   Admin Instructions:   Flush with NS 15 ml before and after administration   Order specific questions:       1801 AP-New Bag            rasburicase 6 mg in sodium chloride 0.9% 50 mL IVPB  Dose: 6 mg  Freq: Once Route: IV  Last Dose: 6 mg (04/19/25 1437)  Start: 04/19/25 1400 End: 04/19/25 1507   Admin Instructions:   Flush with NS 15 ml before and after administration   Order specific questions:        1437 AJ-New Bag           riTUXimab-abbs (Truxima) 700 mg in sodium chloride 0.9% 570 mL infusion  Dose: 375 mg/m2  Weight Dosing Info: 74.6 kg  Freq: Every 24 hours Route: IV  Start: 04/20/25 1000 End: 04/20/25 1510   Admin Instructions:   For rituximab 1.2 mg/ml initial infusion:    Increase rate every 30 min as tolerated  50 mg/hr = 42 ml/hr  100 mg/hr = 83 ml/hr  150 mg/hr = 125 ml/hr  200 mg/hr = 167 ml/hr  250 mg/hr = 208 ml/hr  300 mg/hr = 250 ml/hr  350 mg/hr = 292 ml/hr  400 mg/hr = 333 ml/hr   Order specific questions:         1110 EL-New Bag          riTUXimab-abbs (Truxima) 700 mg in sodium chloride 0.9% 570 mL infusion  Dose: 375 mg/m2  Weight Dosing Info: 66.8 kg  Freq: Every 24 hours Route: IV  Start: 04/19/25 1600 End: 04/19/25 1418   Admin Instructions:   For rituximab 1.2 mg/ml initial infusion:    Increase rate every 30 min as tolerated  50 mg/hr = 42 ml/hr  100 mg/hr = 83 ml/hr  150 mg/hr = 125 ml/hr  200 mg/hr = 167 ml/hr  250 mg/hr = 208 ml/hr  300 mg/hr = 250 ml/hr  350 mg/hr = 292 ml/hr  400 mg/hr = 333 ml/hr   Order specific questions:        1418-D/C'd     1520 AD-Rate/Dose Change          sevelamer  carbonate (Renvela) tab 800 mg  Dose: 800 mg  Freq: 3 times daily with meals Route: OR  Start: 04/20/25 1300      1324 EL-Given   1916 EL-Given      0820 AF-Given   1223 AF-Given     1700         sodium zirconium cyclosilicate (Lokelma) oral packet 10 g  Dose: 10 g  Freq: Every 8 hours Route: OR  Start: 04/20/25 0715 End: 04/22/25 1514   Admin Instructions:   Empty entire contents of the packet(s) into a glass with 3 tablespoons (45 mL) of water. Stir well and drink immediately; if powder remains in the glass, add water, stir and drink immediately; repeat until no powder remains. Administer other oral medications 2 hours before or 2 hours after dose.      0745 KK-Given   1604 EL-Given     2240 SM-Given       0551 SM-Given   1516 AF-Given     2315         vinCRIStine (Oncovin) 2 mg in sodium chloride 0.9% 52 mL IVPB  Dose: 2 mg  Freq: Every 24 hours Route: IV  Start: 04/19/25 1515 End: 04/19/25 1715   Admin Instructions:   Administer via gravity  CAUTION: CHEMOTHERAPY. Must be diluted in IVPB; VESICANT     1710 AJ/MK-New Bag             dextrose 5%-sodium chloride 0.9% infusion  Rate: 150 mL/hr  Freq: Continuous Route: IV  Last Dose: Stopped (04/20/25 0733)  Start: 04/18/25 1845    1903 AP-New Bag       1153 AJ-New Bag       0515 KK-New Bag   0733 KK-Stopped         sodium chloride 0.9% infusion  Rate: 150 mL/hr  Freq: Continuous Route: IV  Start: 04/20/25 0715      0733 KK-New Bag   1528 AD-New Bag         sodium chloride 0.9% infusion  Rate: 150 mL/hr  Freq: Continuous Route: IV  Start: 04/18/25 1315 End: 04/18/25 1836    1526 AP-New Bag   1836-D/C'd           gadoterate meglumine (Dotarem) 10 MMOL/20ML injection 20 mL  Dose: 20 mL  Freq: IMG once as needed Route: IV  PRN Reason: contrast  Start: 04/19/25 0440 End: 04/19/25 0440   Admin Instructions:   Keep at room temp; Administer per Protocol     0440 HZ-Given           HYDROcodone-acetaminophen (Norco) 5-325 MG per tab 1 tablet  Dose: 1 tablet  Freq: Every 6  hours PRN Route: OR  PRN Reasons: moderate pain,mild pain,severe pain  Start: 04/18/25 1654    1757 AP-Given         (1523 AF)-Not Given         oxyCODONE immediate release tab 5 mg  Dose: 5 mg  Freq: Every 4 hours PRN Route: OR  PRN Reason: moderate pain  Start: 04/18/25 1802    2242 AB-Given       0551 AB-Given   1420 AJ-Given      0237 KK-Given   1012 EL-Given     1608 EL-Given       1528 AF-Given         polyethylene glycol (PEG 3350) (Miralax) 17 g oral packet 17 g  Dose: 17 g  Freq: DAILY PRN Route: OR  PRN Reason: constipation  PRN Comment: If no bowel movement in last 24 hours  Start: 04/18/25 1447   Admin Instructions:   Use prior to Senokot, Dulcolax, or Fleet's Enema.  1 packet=17gm=1 heaping tablespoon; Dissolve in 8 oz of water      1432 EL-Given            Vitals (last day)       Date/Time Temp Pulse Resp BP SpO2 Weight O2 Device O2 Flow Rate (L/min) [facility]    04/21/25 1230 -- 93 18 130/90 94 % -- None (Room air) --     04/21/25 0819 97.8 °F (36.6 °C) 87 20 120/76 94 % -- None (Room air) --     04/21/25 0440 97.8 °F (36.6 °C) 85 -- 126/80 100 % 169 lb 9.6 oz (76.9 kg) None (Room air) --     04/21/25 0440 -- -- 18 -- -- -- -- --     04/21/25 0034 97.7 °F (36.5 °C) 84 20 118/72 93 % -- None (Room air) --     04/20/25 2037 98 °F (36.7 °C) 90 18 110/65 92 % -- None (Room air) --     04/20/25 1610 98.1 °F (36.7 °C) 116 20 113/76 91 % -- None (Room air) --     04/20/25 1120 97.9 °F (36.6 °C) 102 16 112/69 92 % -- None (Room air) --     04/20/25 0500 97.7 °F (36.5 °C) 83 16 109/66 93 % -- None (Room air) --     04/20/25 0500 -- -- -- -- -- 168 lb 3.2 oz (76.3 kg) -- --     04/20/25 0222 -- 87 -- -- 93 % -- -- --     04/20/25 0051 98 °F (36.7 °C) 84 16 120/82 92 % -- None (Room air) --      04/19/25 1950 97.8 °F (36.6 °C) 94 18 126/81 94 % -- None (Room air) --    04/19/25 1730 -- 91 -- 121/73 91 % -- -- --    04/19/25 1700 -- 93 -- 114/86 94 % -- -- --    04/19/25 1630 -- 98 --  114/82 92 % -- -- --    04/19/25 1600 98.3 °F (36.8 °C) 99 18 128/72 93 % -- None (Room air) --    04/19/25 1531 -- 101 -- -- 92 % -- -- --    04/19/25 1530 -- 102 -- 124/83 93 % -- -- --    04/19/25 1500 -- 106 -- 125/84 94 % -- -- --    04/19/25 1430 -- 101 -- 128/76 93 % -- -- --    04/19/25 1400 -- 102 -- 138/81 94 % -- -- --    04/19/25 1330 -- 98 -- 130/75 94 % -- -- --    04/19/25 1300 97.9 °F (36.6 °C) 97 18 129/78 92 % -- None (Room air) --    04/19/25 1230 -- 102 -- 117/83 93 % -- -- --    04/19/25 1200 -- 96 -- 123/73 93 % -- -- --    04/19/25 1146 -- 104 -- -- 95 % -- -- --    04/19/25 1145 -- 105 -- 124/79 96 % -- -- --    04/19/25 1137 97.6 °F (36.4 °C) 98 18 112/81 94 % -- None (Room air) --    04/19/25 1130 -- 103 -- 118/78 93 % -- -- --    04/19/25 1051 -- 100 -- 132/84 95 % -- None (Room air) --    04/19/25 0700 -- -- -- -- -- 164 lb 7.4 oz (74.6 kg) -- --    04/19/25 0351 97.9 °F (36.6 °C) 106 -- 112/75 92 % -- None (Room air) --    04/19/25 0020 98.6 °F (37 °C) 100 -- 124/72 95 % -- None (Room air) --    04/18/25 2056 97.7 °F (36.5 °C) 96 -- 119/78 95 % -- None (Room air) --    04/18/25 1601 97.7 °F (36.5 °C) 92 16 114/70 95 % -- None (Room air) --    04/18/25 1341 -- -- -- -- -- 147 lb 3.2 oz (66.8 kg) -- --    04/18/25 1330 98.1 °F (36.7 °C) 99 18 121/91 Abnormal  94 % -- None (Room air) --    04/18/25 1000 -- -- -- -- -- 166 lb 3.6 oz (75.4 kg) -- -- JS         PLEASE GIVE RECONSIDERATION/APPROVAL TO THIS INPT ADMISSION

## 2025-04-21 NOTE — PROGRESS NOTES
[facility]   part of [facility]     Hospitalist Progress Note     Lencho Torres Patient Status:  Inpatient    1955 MRN [id number]   Location [facility] 4NW-A Attending Paul Yao, DO   Hosp Day # 3 PCP Shayan Harris MD     Chief Complaint: Direct admission for chemotherapy    Subjective:     Resting in bed, dry mouth and persistent abdominal pain. Feels tired. Temporary catheter placed earlier for HD    Objective:    Review of Systems:   A comprehensive review of systems was completed; pertinent positive and negatives stated in subjective.    Vital signs:  Temp:  [97.7 °F (36.5 °C)-98.1 °F (36.7 °C)] 97.8 °F (36.6 °C)  Pulse:  [] 85  Resp:  [16-20] 18  BP: (110-126)/(65-80) 126/80  SpO2:  [91 %-100 %] 100 %    Physical Exam:    General: No acute distress  Respiratory: No wheezes, no rhonchi  Cardiovascular: S1, S2, regular rate and rhythm  Abdomen: Soft, Non-tender, +distended, positive bowel sounds  Neuro: No new focal deficits.   Extremities: No edema    Diagnostic Data:    Labs:  Recent Labs   Lab 25  1653 25  0549 25  0511 25  0453   WBC 10.2 11.1* 11.3* 9.0   HGB 15.4 14.8 14.0 12.6*   MCV 91.2 89.7 92.4 92.0   .0 218.0 174.0 137.0*   BAND  --   --  5  --    INR  --  1.12  --   --        Recent Labs   Lab 25  1211 25  0511 25  0633 25  1401 25  2129 25  0453   * 152*  --  149* 226* 224*   BUN 44* 41*  --  58* 80* 77*   CREATSERUM 1.27 1.29  --  1.02 1.34* 1.42*   CA 10.7* 9.7  --  7.6* 7.6* 6.9*   ALB 3.8 3.7  --   --   --  3.4    135*  --  142 138 140   K 4.4 6.9*   < > 4.7 6.0* 5.8*   CL 94* 99  --  106 101 102   CO2 25.0 21.0  --  19.0* 20.0* 20.0*   ALKPHO 97 106  --   --   --  209*   AST 77* 67*  --   --   --  377*   ALT 28 27  --   --   --  63*   BILT 0.8 0.4  --   --   --  0.3   TP 5.9 5.8  --   --   --  5.3*    < > = values in this interval not displayed.       Estimated Glomerular Filtration  Rate: 53 mL/min/1.73m2 (A) (result from lab).    No results for input(s): \"TROP\", \"TROPHS\", \"CK\" in the last 168 hours.    Recent Labs   Lab 04/19/25  0549   PTP 14.5   INR 1.12        Microbiology    No results found for this visit on 04/18/25.      Imaging: Reviewed in Epic.    Medications: Scheduled Medications[1]    Assessment & Plan:      #Diffuse large B cell lymphoma  -acyclovir, allopurinol, steroids  -MRI brain shows no metastatic disease, high FLAIR signal abnormality in left frontal lobe  -S/p LP on 4/19 prior to starting chemo  -CSF labs pending  -Started cycle 1 R-CHOP chemotherapy on 4/19  -Plan for rituximab today  -oncology following    #Tumor lysis syndrome  #Hyperkalemia  #Hyperphosphatemia  -S/p rasburicase on 4/18 and 4/19  -IV fluids  -Allopurinol  -Calcium gluconate, insulin, D50, Lokelma  -temporary catheter placed for HD this morning   -Nephrology following    #LISSETT, improved  -In the setting of tumor lysis syndrome  -IV fluids  -HD as above    #Hypercalcemia d/t TLS  -IVF, HD, management as above   -Nephrology following    #Cancer-related pain  -Oxycodone as needed    #HTN  -controlled  -not on medications    Lauren Dueñas,       Supplementary Documentation:     Quality:  DVT Mechanical Prophylaxis:   SCDs,    DVT Pharmacologic Prophylaxis   Medication    [Held by provider] enoxaparin (Lovenox) 40 MG/0.4ML SUBQ injection 40 mg                Code Status: Not on file  [place]: No urinary catheter in place  [place] Duration (in days):   Central line:    KAY:     Discharge is dependent on: Clinical improvement  At this point Mr. Torres is expected to be discharge to: Home    The 21st Century Cures Act makes medical notes like these available to patients in the interest of transparency. Please be advised this is a medical document. Medical documents are intended to carry relevant information, facts as evident, and the clinical opinion of the practitioner. The medical note is intended as peer to  peer communication and may appear blunt or direct. It is written in medical language and may contain abbreviations or verbiage that are unfamiliar.     Dietitian Malnutrition Assessment    Evaluation for Malnutrition:                       RD Malnutrition Care Plan:      Body Fat/Muscle Mass:          Physician Assessment     Patient has a diagnosis of moderate malnutrition        **Certification      PHYSICIAN Certification of Need for Inpatient Hospitalization - Initial Certification    Patient will require inpatient services that will reasonably be expected to span two midnight's based on the clinical documentation in H+P.   Based on patients current state of illness, I anticipate that, after discharge, patient will require TBD.           [1]    sodium zirconium cyclosilicate  10 g Oral Q8H    sevelamer carbonate  800 mg Oral TID CC    allopurinol  300 mg Oral BID    acyclovir  400 mg Oral BID    predniSONE  100 mg Oral Daily with breakfast    [Held by provider] enoxaparin  40 mg Subcutaneous Daily

## 2025-04-21 NOTE — PLAN OF CARE
Received pt alert and orientated x4. VSS. No sob on RA. Afebrile. C/o pain, PRN given with good relief. All other meds given per MAR. Up and using the bathroom. Poor PO intake. Temp cath placed, dialysis ordered for today. Fall precautions in place, call light within reach.     Problem: METABOLIC/FLUID AND ELECTROLYTES - ADULT  Goal: Electrolytes maintained within normal limits  Description: INTERVENTIONS:- Monitor labs and rhythm and assess patient for signs and symptoms of electrolyte imbalances- Administer electrolyte replacement as ordered- Monitor response to electrolyte replacements, including rhythm and repeat lab results as appropriate- Fluid restriction as ordered- Instruct patient on fluid and nutrition restrictions as appropriate  Outcome: Progressing  Goal: Hemodynamic stability and optimal renal function maintained  Description: INTERVENTIONS:- Monitor labs and assess for signs and symptoms of volume excess or deficit- Monitor intake, output and patient weight- Monitor urine specific gravity, serum osmolarity and serum sodium as indicated or ordered- Monitor response to interventions for patient's volume status, including labs, urine output, blood pressure (other measures as available)- Encourage oral intake as appropriate- Instruct patient on fluid and nutrition restrictions as appropriate  Outcome: Progressing     Problem: SKIN/TISSUE INTEGRITY - ADULT  Goal: Incision(s), wounds(s) or drain site(s) healing without S/S of infection  Description: INTERVENTIONS:- Assess and document risk factors for pressure ulcer development- Assess and document skin integrity- Assess and document dressing/incision, wound bed, drain sites and surrounding tissue- Implement wound care per orders- Initiate isolation precautions as appropriate- Initiate Pressure Ulcer prevention bundle as indicated  Outcome: Progressing     Problem: HEMATOLOGIC - ADULT  Goal: Maintains hematologic stability  Description: INTERVENTIONS-  Assess for signs and symptoms of bleeding or hemorrhage- Monitor labs and vital signs for trends- Administer supportive blood products/factors, fluids and medications as ordered and appropriate- Administer supportive blood products/factors as ordered and appropriate  Outcome: Progressing     Problem: GASTROINTESTINAL - ADULT  Goal: Maintains adequate nutritional intake (undernourished)  Description: INTERVENTIONS:- Monitor percentage of each meal consumed- Identify factors contributing to decreased intake, treat as appropriate- Assist with meals as needed- Monitor I&O, WT and lab values- Obtain nutritional consult as needed- Optimize oral hygiene and moisture- Encourage food from home; allow for food preferences- Enhance eating environment  Outcome: Progressing     Problem: HEMATOLOGIC - ADULT  Goal: Maintains hematologic stability  Description: INTERVENTIONS- Assess for signs and symptoms of bleeding or hemorrhage- Monitor labs and vital signs for trends- Administer supportive blood products/factors, fluids and medications as ordered and appropriate- Administer supportive blood products/factors as ordered and appropriate  Outcome: Progressing     Problem: PAIN - ADULT  Goal: Verbalizes/displays adequate comfort level or patient's stated pain goal  Description: INTERVENTIONS:- Encourage pt to monitor pain and request assistance- Assess pain using appropriate pain scale- Administer analgesics based on type and severity of pain and evaluate response- Implement non-pharmacological measures as appropriate and evaluate response- Consider cultural and social influences on pain and pain management- Manage/alleviate anxiety- Utilize distraction and/or relaxation techniques- Monitor for opioid side effects- Notify MD/LIP if interventions unsuccessful or patient reports new pain- Anticipate increased pain with activity and pre-medicate as appropriate  Outcome: Progressing

## 2025-04-21 NOTE — PROCEDURES
[facility]   part of [facility]  Procedure Note    Lencho Torres Patient Status:  Inpatient    1955 MRN [id number]   Location [facility] 4NW-A Attending Lauren Dueñas, DO   Hosp Day # 3 PCP Shayan Harris MD     Procedure: Temporary dialysis catheter placement    Pre-Procedure Diagnosis:  Hyperkalemia    Post-Procedure Diagnosis: Same    Anesthesia:  Local    Findings:  patent right internal jugular vein    Specimens: None    Blood Loss:  < 5 cc    Tourniquet Time: None  Complications:  None  Drains:  none    Secondary Diagnosis:  N/A    Kaleigh Lam MD  2025

## 2025-04-21 NOTE — PLAN OF CARE
Pt received A&Ox4. VSS. RA. Tele. Afebrile. Pt c/o shoulder & abd pain, medications given per MAR. IVF. Call light within reach. Fall precautions in place.    Problem: GASTROINTESTINAL - ADULT  Goal: Maintains adequate nutritional intake (undernourished)  Description: INTERVENTIONS:- Monitor percentage of each meal consumed- Identify factors contributing to decreased intake, treat as appropriate- Assist with meals as needed- Monitor I&O, WT and lab values- Obtain nutritional consult as needed- Optimize oral hygiene and moisture- Encourage food from home; allow for food preferences- Enhance eating environment  Outcome: Progressing     Problem: METABOLIC/FLUID AND ELECTROLYTES - ADULT  Goal: Electrolytes maintained within normal limits  Description: INTERVENTIONS:- Monitor labs and rhythm and assess patient for signs and symptoms of electrolyte imbalances- Administer electrolyte replacement as ordered- Monitor response to electrolyte replacements, including rhythm and repeat lab results as appropriate- Fluid restriction as ordered- Instruct patient on fluid and nutrition restrictions as appropriate  Outcome: Progressing  Goal: Hemodynamic stability and optimal renal function maintained  Description: INTERVENTIONS:- Monitor labs and assess for signs and symptoms of volume excess or deficit- Monitor intake, output and patient weight- Monitor urine specific gravity, serum osmolarity and serum sodium as indicated or ordered- Monitor response to interventions for patient's volume status, including labs, urine output, blood pressure (other measures as available)- Encourage oral intake as appropriate- Instruct patient on fluid and nutrition restrictions as appropriate  Outcome: Progressing     Problem: SKIN/TISSUE INTEGRITY - ADULT  Goal: Incision(s), wounds(s) or drain site(s) healing without S/S of infection  Description: INTERVENTIONS:- Assess and document risk factors for pressure ulcer development- Assess and document  skin integrity- Assess and document dressing/incision, wound bed, drain sites and surrounding tissue- Implement wound care per orders- Initiate isolation precautions as appropriate- Initiate Pressure Ulcer prevention bundle as indicated  Outcome: Progressing     Problem: HEMATOLOGIC - ADULT  Goal: Maintains hematologic stability  Description: INTERVENTIONS- Assess for signs and symptoms of bleeding or hemorrhage- Monitor labs and vital signs for trends- Administer supportive blood products/factors, fluids and medications as ordered and appropriate- Administer supportive blood products/factors as ordered and appropriate  Outcome: Progressing     Problem: PAIN - ADULT  Goal: Verbalizes/displays adequate comfort level or patient's stated pain goal  Description: INTERVENTIONS:- Encourage pt to monitor pain and request assistance- Assess pain using appropriate pain scale- Administer analgesics based on type and severity of pain and evaluate response- Implement non-pharmacological measures as appropriate and evaluate response- Consider cultural and social influences on pain and pain management- Manage/alleviate anxiety- Utilize distraction and/or relaxation techniques- Monitor for opioid side effects- Notify MD/LIP if interventions unsuccessful or patient reports new pain- Anticipate increased pain with activity and pre-medicate as appropriate  Outcome: Progressing

## 2025-04-21 NOTE — PROGRESS NOTES
[facility]   part of [facility]     Nephrology Progress Note    Lencho Torres Patient Status:  Inpatient    1955 MRN [id number]   Location [facility] 4NW-A Attending Lauren Dueñas, DO   Hosp Day # 3 PCP Shayan Harris MD       SUBJECTIVE:  Stable this AM        Physical Exam:   /80 (BP Location: Left arm)   Pulse 85   Temp 97.8 °F (36.6 °C) (Oral)   Resp 18   Wt 169 lb 9.6 oz (76.9 kg)   SpO2 100%   BMI 23.00 kg/m²   Temp (24hrs), Av.9 °F (36.6 °C), Min:97.7 °F (36.5 °C), Max:98.1 °F (36.7 °C)       Intake/Output Summary (Last 24 hours) at 2025 0736  Last data filed at 2025 0543  Gross per 24 hour   Intake 3003 ml   Output 1150 ml   Net 1853 ml     Last 3 Weights   25 0440 169 lb 9.6 oz (76.9 kg)   25 0500 168 lb 3.2 oz (76.3 kg)   25 0700 164 lb 7.4 oz (74.6 kg)   25 1341 147 lb 3.2 oz (66.8 kg)   25 1000 166 lb 3.6 oz (75.4 kg)   25 1314 166 lb 3.2 oz (75.4 kg)   25 1915 178 lb 9.6 oz (81 kg)   25 1731 176 lb (79.8 kg)   25 1303 176 lb (79.8 kg)   25 1109 176 lb 6.4 oz (80 kg)   22 1013 174 lb 3.2 oz (79 kg)     General: Alert and oriented in no apparent distress.  HEENT: No scleral icterus, MMM  Neck: Supple, no DULCE or thyromegaly  Cardiac: Regular rate and rhythm, S1, S2 normal, no murmur or rub  Lungs: Clear without wheezes, rales, rhonchi.    Abdomen: Soft, non-tender. + bowel sounds, no palpable organomegaly  Extremities: Without clubbing, cyanosis or edema.  Neurologic:  moving all extremities  Skin: Warm and dry, no rash        Labs:     Recent Labs   Lab 25  1653 25  0549 25  0511 25  0453   WBC 10.2 11.1* 11.3* 9.0   HGB 15.4 14.8 14.0 12.6*   MCV 91.2 89.7 92.4 92.0   .0 218.0 174.0 137.0*   BAND  --   --  5  --    INR  --  1.12  --   --        Recent Labs   Lab 25  1625 25  2253 25  0549 25  1211 25  0511 25  0633  04/20/25  1401 04/20/25  2129 04/21/25  0453   * 131* 134* 136 135*  --  142 138 140   K 5.1 5.3* 4.8 4.4 6.9* 6.9* 4.7 6.0* 5.8*   CL 88* 90* 92* 94* 99  --  106 101 102   CO2 21.0 23.0 25.0 25.0 21.0  --  19.0* 20.0* 20.0*   BUN 49* 48* 44* 44* 41*  --  58* 80* 77*   CREATSERUM 1.61* 1.54* 1.47* 1.27 1.29  --  1.02 1.34* 1.42*   CA 11.8* 11.0* 11.2* 10.7* 9.7  --  7.6* 7.6* 6.9*   MG 2.6 2.4 2.4  --  2.4  --   --   --   --    PHOS 5.3* 4.3 3.4 3.0 10.6* 11.3* 10.4*  --  17.4*   GLU 61* 87 93 105* 152*  --  149* 226* 224*       Recent Labs   Lab 04/18/25  2253 04/19/25  0549 04/19/25  1211 04/20/25  0511 04/21/25  0453   ALT 27 27 28 27 63*   AST 58* 71* 77* 67* 377*   ALB 3.9 3.9 3.8 3.7 3.4   LDH 2,514* 3,279* 3,556* 3,553* >4,500*       Recent Labs   Lab 04/18/25  1807 04/20/25  0707 04/20/25  0907 04/20/25  1008 04/20/25  1120   PGLU 89 159* 140* 173* 197*       Meds:   Current Hospital Medications[1]      Impression/Plan:      1) Hyperkalemia: Due to severe TLS. Was admitted for chemo initiation in setting of newly diagnosed aggressive DLBCL with high tumor burden. Received chemo yesterday and now with significant hyperkalemia. Had been on D5W-0.9NS prior to chemo initiation, needs aggressive hydration - switched to 0.9NS. Medical management with lokelma, calcium gluconate, insulin/glucose ordered. Continue to follow closely given high risk of worsening hyperkalemia especially given plan for rituxan per heme. Risk of worsening TLS present so will need to follow labs closely. K remains up but better.  That being said will plan HD today     2) Hyperphos: 2/2 above. Start aggressive hydration and phos binder ordered. Phos markedly higher today and although phos generally has poor clearance with HD will dialyze today in part due to worsening ca level (as well as persistently high K) .  That being said would expect some degree of phos clearance     3) DLBCL: admitted for R-CHOP, received vincristine, cytoxan  and doxorubicin 4/19. Rituxan given this AM per heme.     4) Hyperuricemia: 2/2 TLS. S/p rasburicase x2. Improved.      5) Acute kidney injury: baseline serum creatinine ~0.8-0.9 mg/dL. Worsening serum creatinine 2/2 likely TLS and hypercalcemia. Relatively stable today.  HD not for LISSETT. Continue aggressive hydration.    Questions/concerns were discussed with patient and/or family by bedside.    D/w heme service      Nico Whitman MD  4/21/2025  7:36 AM         [1]   Current Facility-Administered Medications   Medication Dose Route Frequency    sodium chloride 0.9% infusion   Intravenous Continuous    sodium zirconium cyclosilicate (Lokelma) oral packet 10 g  10 g Oral Q8H    sevelamer carbonate (Renvela) tab 800 mg  800 mg Oral TID CC    allopurinol (Zyloprim) tab 300 mg  300 mg Oral BID    acetaminophen (Tylenol) tab 650 mg  650 mg Oral Once PRN    diphenhydrAMINE (Benadryl) 50 mg/mL  injection 50 mg  50 mg Intravenous Once PRN    prochlorperazine (Compazine) 10 MG/2ML injection 10 mg  10 mg Intravenous Q6H PRN    methylPREDNISolone sodium succinate (Solu-MEDROL) injection 125 mg  125 mg Intravenous Once PRN    EPINEPHrine (Adrenalin) 1 MG/ML injection (Allergic Reaction Kit) 0.3 mg  0.3 mg Intramuscular Once PRN    acyclovir (Zovirax) tab 400 mg  400 mg Oral BID    predniSONE (Deltasone) tab 100 mg  100 mg Oral Daily with breakfast    melatonin tab 3 mg  3 mg Oral Nightly PRN    ondansetron (Zofran) 4 MG/2ML injection 4 mg  4 mg Intravenous Q6H PRN    [Held by provider] enoxaparin (Lovenox) 40 MG/0.4ML SUBQ injection 40 mg  40 mg Subcutaneous Daily    polyethylene glycol (PEG 3350) (Miralax) 17 g oral packet 17 g  17 g Oral Daily PRN    sennosides (Senokot) tab 17.2 mg  17.2 mg Oral Nightly PRN    bisacodyl (Dulcolax) 10 MG rectal suppository 10 mg  10 mg Rectal Daily PRN    fleet enema (Fleet) rectal enema 133 mL  1 enema Rectal Once PRN    HYDROcodone-acetaminophen (Norco) 5-325 MG per tab 1 tablet  1 tablet  Oral Q6H PRN    oxyCODONE immediate release tab 5 mg  5 mg Oral Q4H PRN    dextrose 5%-sodium chloride 0.9% infusion   Intravenous Continuous

## 2025-04-21 NOTE — PROGRESS NOTES
Hematology/Oncology Progress Note    Patient Name: Lencho Torres  Medical Record Number: [id number]    YOB: 1955     Reason for Consultation:  Lencho Torres was seen today for the diagnosis of DLBCL, tumor lysis syndrome, LISSETT    Interval events:    Patient was seen at bedside today. He endorses having multiple episodes of loose stools which resolved,   No nausea/ vomiting. No ferers. Noticed some blood in urine.     Had catheter placed today to start dialsysis.       Inpatient Meds:  Scheduled Medications[1]  Medication Infusions[2]  PRN Meds:  PRN Medications[3]  Allergies:   Allergies[4]    Vital Signs:  Height: --  Weight: 76.9 kg (169 lb 9.6 oz) (04/21 0440)  BSA (Calculated - sq m): --  Pulse: 93 (04/21 1230)  BP: 130/90 (04/21 1230)  Temp: 97.8 °F (36.6 °C) (04/21 0819)  Do Not Use - Resp Rate: --  SpO2: 94 % (04/21 1230)  Wt Readings from Last 6 Encounters:   04/21/25 76.9 kg (169 lb 9.6 oz)   04/17/25 75.4 kg (166 lb 3.2 oz)   04/09/25 81 kg (178 lb 9.6 oz)   04/08/25 80 kg (176 lb 6.4 oz)   09/01/22 79 kg (174 lb 3.2 oz)   02/07/22 83 kg (183 lb)     Physical Examination:  General: Patient is alert and oriented, not in acute distress  CV: Regular rate and rhythm, no murmurs, no LE edema  Respiratory: Lungs clear to auscultation bilaterally  GI/Abd: Soft, +mild tenderness.   Skin: no rashes or petechiae  Lines: +PICC    Laboratory:  Recent Labs   Lab 04/19/25  0549 04/20/25  0511 04/21/25  0453   WBC 11.1* 11.3* 9.0   HGB 14.8 14.0 12.6*   HCT 42.8 41.3 36.7*   .0 174.0 137.0*   MCV 89.7 92.4 92.0   RDW 11.9 11.9 11.7   NEPRELIM 8.84* 9.53* 8.60*     Recent Labs   Lab 04/19/25  1211 04/20/25  0511 04/20/25  0633 04/20/25  1401 04/20/25  2129 04/21/25  0453    135*  --  142 138 140   K 4.4 6.9* 6.9* 4.7 6.0* 5.8*   CL 94* 99  --  106 101 102   CO2 25.0 21.0  --  19.0* 20.0* 20.0*   BUN 44* 41*  --  58* 80* 77*   CREATSERUM 1.27 1.29  --  1.02 1.34* 1.42*   * 152*  --  149* 226*  224*   CA 10.7* 9.7  --  7.6* 7.6* 6.9*   PHOS 3.0 10.6* 11.3* 10.4*  --  17.4*   TP 5.9 5.8  --   --   --  5.3*   ALB 3.8 3.7  --   --   --  3.4   ALKPHO 97 106  --   --   --  209*   AST 77* 67*  --   --   --  377*   ALT 28 27  --   --   --  63*   BILT 0.8 0.4  --   --   --  0.3     Recent Labs   Lab 04/19/25  0549   INR 1.12     Imaging:    PET/CT 4/18: CONCLUSION:     1. Widespread, intensely hypermetabolic disease involving bone marrow, pleura, omentum, peritoneum, mesentery, and multiple lymph nodes of the neck and thorax concordant with biopsy-proven diffuse large B-cell lymphoma.    2. Focal hypermetabolism appears localized intracranially within left posterior fossa suspicious for either dural or intraparenchymal metastasis.  This may be better assessed with brain MRI.     MRI brain 4/19: CONCLUSION:     1. No evidence of parenchymal metastatic disease.    2. The 8 mm high FLAIR signal abnormality in the left frontal lobe subcortical white matter the demonstrates likely T2 shine through on diffusion-weighted images is likely sequelae of a subacute/chronic infarct.  There is volume loss in this region.    This is highly unlikely to represent a nonenhancing focus of brain metastatic disease.  However, continued follow-up as needed may be done.     Impression & Plan:     *DLBCL, arising from histologic transformation from [place]  High tumor burden with spontaneous tumor lysis syndrome requiring inpatient chemotherapy for ongoing close monitoring and management.  - PET/CT scan suggested possible left posterior fossa CNS disease, therefore MRI brain performed which does not appear to be as suspicious for this.  S/p LP 4/19 prior to starting chemo- CSF analysis pending  - Started cycle 1 R-CHOP chemotherapy 4/19  acyclovir 400 mg BID     *Tumor lysis syndrome, hyperkalemia, hyperphosphatemia  - Presented with markedly elevated uric acid with renal insufficiency even prior to starting chemotherapy. S/p rasburicase 3 mg  4/18 and 6 mg 4/19 with improvement.    - Today has developed hyperkalemia, hyperphosphatemia related to tumor lysis syndrome upon starting chemotherapy.    - he is scheduled for HD session today. Lokelma, sevelamer  Monitor electrolytes closely.    *LISSETT  - Likely related to tumor lysis syndrome, hypercalcemia  - HD session todat    *Cancer related pain  - Extensive malignant peritoneal involvement causing pain.  - Oxycodone 5mg prn      Hematology will continue to follow up .     Charlene Taylor MD         [1]    heparin  1.5 mL Intracatheter Once    sodium zirconium cyclosilicate  10 g Oral Q8H    sevelamer carbonate  800 mg Oral TID CC    allopurinol  300 mg Oral BID    acyclovir  400 mg Oral BID    predniSONE  100 mg Oral Daily with breakfast    [Held by provider] enoxaparin  40 mg Subcutaneous Daily   [2]    sodium chloride 150 mL/hr at 04/20/25 1528    dextrose 5%-sodium chloride 0.9% Stopped (04/20/25 0733)   [3]   sodium chloride **AND** albumin human    acetaminophen    diphenhydrAMINE    prochlorperazine    melatonin    ondansetron    polyethylene glycol (PEG 3350)    sennosides    bisacodyl    fleet enema    HYDROcodone-acetaminophen    oxyCODONE  [4]   Allergies  Allergen Reactions    Ibuprofen WHEEZING

## 2025-04-22 PROBLEM — E79.0 HYPERURICEMIA: Status: ACTIVE | Noted: 2025-01-01

## 2025-04-22 PROBLEM — E79.0 HYPERURICEMIA: Status: ACTIVE | Noted: 2025-04-18

## 2025-04-22 NOTE — PROGRESS NOTES
[facility]   part of formerly [facility]     Nephrology Progress Note    Lencho Torres Patient Status:  Inpatient    1955 MRN [id number]   Location [facility] 4NW-A Attending Lauren Dueñas, DO   Hosp Day # 4 PCP Shayan Harris MD       SUBJECTIVE:    No acute events    Current Hospital Medications[1]      Physical Exam:   /82 (BP Location: Left arm)   Pulse 92   Temp 97.7 °F (36.5 °C) (Oral)   Resp 20   Wt 169 lb 9.6 oz (76.9 kg)   SpO2 97%   BMI 23.00 kg/m²   Temp (24hrs), Av.6 °F (36.4 °C), Min:96.7 °F (35.9 °C), Max:98 °F (36.7 °C)       Intake/Output Summary (Last 24 hours) at 2025 0815  Last data filed at 2025 2115  Gross per 24 hour   Intake 2639 ml   Output 175 ml   Net 2464 ml     Last 3 Weights   25 0440 169 lb 9.6 oz (76.9 kg)   25 0500 168 lb 3.2 oz (76.3 kg)   25 0700 164 lb 7.4 oz (74.6 kg)   25 1341 147 lb 3.2 oz (66.8 kg)   25 1000 166 lb 3.6 oz (75.4 kg)   25 1314 166 lb 3.2 oz (75.4 kg)   25 1915 178 lb 9.6 oz (81 kg)   25 1731 176 lb (79.8 kg)   25 1303 176 lb (79.8 kg)   25 1109 176 lb 6.4 oz (80 kg)   22 1013 174 lb 3.2 oz (79 kg)     General: Alert and oriented in no apparent distress.  HEENT: No scleral icterus, MMM  Neck: Supple, no DULCE or thyromegaly  Cardiac: Regular rate and rhythm, S1, S2 normal, no murmur or rub  Lungs: Clear without wheezes, rales, rhonchi.    Abdomen: Soft, non-tender. + bowel sounds, no palpable organomegaly  Extremities: Without clubbing, cyanosis or edema.  Neurologic:  moving all extremities  Skin: Warm and dry, no rash    Recent Labs     25  0511 25  0453   WBC 11.3* 9.0   HGB 14.0 12.6*   MCV 92.4 92.0   .0 137.0*   BAND 5  --        Recent Labs     25  0511 25  0633 25  1401 25  2129 25  0453 25  1656 25  0317   *  --  142 138 140 142 141   K 6.9* 6.9* 4.7 6.0* 5.8* 4.1 3.8   CL 99  --  106  101 102 103 104   CO2 21.0  --  19.0* 20.0* 20.0* 23.0 26.0   BUN 41*  --  58* 80* 77* 66* 42*   CREATSERUM 1.29  --  1.02 1.34* 1.42* 1.02 1.07   CA 9.7  --  7.6* 7.6* 6.9* 7.4* 7.5*   MG 2.4  --   --   --   --   --   --    PHOS 10.6* 11.3* 10.4*  --  17.4*  --  8.6*       Recent Labs     04/19/25  1211 04/20/25  0511 04/21/25  0453 04/22/25  0317   ALT 28 27 63* 50*   AST 77* 67* 377* 167*   ALB 3.8 3.7 3.4 3.3   LDH 3,556* 3,553* >4,500*  --            Impression/Plan:      1.Hyperkalemia: Due to severe TLS. Was admitted for chemo initiation in setting of newly diagnosed aggressive DLBCL with high tumor burden. Received chemo few day ago --> significant hyperkalemia--> s/p HD w/ imrovement   - monitor closely      2.Hyperphos: 2/2 above. S/p HD + on phos binder (renvela); monitor - improved      3. DLBCL: admitted for R-CHOP, received vincristine, cytoxan and doxorubicin 4/19. S/p Rituxam - per heme.     4. Hyperuricemia: 2/2 TLS. S/p rasburicase x2. Improved.      5. Acute kidney injury: baseline serum creatinine ~0.8-0.9 mg/dL. Worsening serum creatinine 2/2 likely TLS and hypercalcemia. Relatively stable today.     - Continue aggressive hydration and monitor     Questions/concerns were discussed with patient and/or family by bedside.       Rodríguez Borja  4/22/2025         [1]   Current Facility-Administered Medications   Medication Dose Route Frequency    sodium chloride 0.9 % IV bolus 100 mL  100 mL Intravenous Q30 Min PRN    And    albumin human (Albumin) 25% injection 25 g  25 g Intravenous PRN Dialysis    sodium chloride 0.9% infusion   Intravenous Continuous    sevelamer carbonate (Renvela) tab 800 mg  800 mg Oral TID CC    allopurinol (Zyloprim) tab 300 mg  300 mg Oral BID    acetaminophen (Tylenol) tab 650 mg  650 mg Oral Once PRN    diphenhydrAMINE (Benadryl) 50 mg/mL  injection 50 mg  50 mg Intravenous Once PRN    prochlorperazine (Compazine) 10 MG/2ML injection 10 mg  10 mg Intravenous Q6H PRN     acyclovir (Zovirax) tab 400 mg  400 mg Oral BID    predniSONE (Deltasone) tab 100 mg  100 mg Oral Daily with breakfast    melatonin tab 3 mg  3 mg Oral Nightly PRN    ondansetron (Zofran) 4 MG/2ML injection 4 mg  4 mg Intravenous Q6H PRN    [Held by provider] enoxaparin (Lovenox) 40 MG/0.4ML SUBQ injection 40 mg  40 mg Subcutaneous Daily    polyethylene glycol (PEG 3350) (Miralax) 17 g oral packet 17 g  17 g Oral Daily PRN    sennosides (Senokot) tab 17.2 mg  17.2 mg Oral Nightly PRN    bisacodyl (Dulcolax) 10 MG rectal suppository 10 mg  10 mg Rectal Daily PRN    fleet enema (Fleet) rectal enema 133 mL  1 enema Rectal Once PRN    HYDROcodone-acetaminophen (Norco) 5-325 MG per tab 1 tablet  1 tablet Oral Q6H PRN    oxyCODONE immediate release tab 5 mg  5 mg Oral Q4H PRN    dextrose 5%-sodium chloride 0.9% infusion   Intravenous Continuous

## 2025-04-22 NOTE — PLAN OF CARE
Pt received A&Ox4. VSS. Afebrile. C/o moderate pain. PRN given with relief. All meds per MAR. IVF infusing @150mL/h. Up ambulating in room with assistance. Voiding. Tolerating diet, but poor appetite. Updated on POC. Call light within reach.    Problem: GASTROINTESTINAL - ADULT  Goal: Maintains adequate nutritional intake (undernourished)  Description: INTERVENTIONS:- Monitor percentage of each meal consumed- Identify factors contributing to decreased intake, treat as appropriate- Assist with meals as needed- Monitor I&O, WT and lab values- Obtain nutritional consult as needed- Optimize oral hygiene and moisture- Encourage food from home; allow for food preferences- Enhance eating environment  Outcome: Progressing     Problem: METABOLIC/FLUID AND ELECTROLYTES - ADULT  Goal: Electrolytes maintained within normal limits  Description: INTERVENTIONS:- Monitor labs and rhythm and assess patient for signs and symptoms of electrolyte imbalances- Administer electrolyte replacement as ordered- Monitor response to electrolyte replacements, including rhythm and repeat lab results as appropriate- Fluid restriction as ordered- Instruct patient on fluid and nutrition restrictions as appropriate  Outcome: Progressing  Goal: Hemodynamic stability and optimal renal function maintained  Description: INTERVENTIONS:- Monitor labs and assess for signs and symptoms of volume excess or deficit- Monitor intake, output and patient weight- Monitor urine specific gravity, serum osmolarity and serum sodium as indicated or ordered- Monitor response to interventions for patient's volume status, including labs, urine output, blood pressure (other measures as available)- Encourage oral intake as appropriate- Instruct patient on fluid and nutrition restrictions as appropriate  Outcome: Progressing     Problem: SKIN/TISSUE INTEGRITY - ADULT  Goal: Incision(s), wounds(s) or drain site(s) healing without S/S of infection  Description: INTERVENTIONS:-  Assess and document risk factors for pressure ulcer development- Assess and document skin integrity- Assess and document dressing/incision, wound bed, drain sites and surrounding tissue- Implement wound care per orders- Initiate isolation precautions as appropriate- Initiate Pressure Ulcer prevention bundle as indicated  Outcome: Progressing     Problem: HEMATOLOGIC - ADULT  Goal: Maintains hematologic stability  Description: INTERVENTIONS- Assess for signs and symptoms of bleeding or hemorrhage- Monitor labs and vital signs for trends- Administer supportive blood products/factors, fluids and medications as ordered and appropriate- Administer supportive blood products/factors as ordered and appropriate  Outcome: Progressing     Problem: PAIN - ADULT  Goal: Verbalizes/displays adequate comfort level or patient's stated pain goal  Description: INTERVENTIONS:- Encourage pt to monitor pain and request assistance- Assess pain using appropriate pain scale- Administer analgesics based on type and severity of pain and evaluate response- Implement non-pharmacological measures as appropriate and evaluate response- Consider cultural and social influences on pain and pain management- Manage/alleviate anxiety- Utilize distraction and/or relaxation techniques- Monitor for opioid side effects- Notify MD/LIP if interventions unsuccessful or patient reports new pain- Anticipate increased pain with activity and pre-medicate as appropriate  Outcome: Progressing

## 2025-04-22 NOTE — PROGRESS NOTES
Hem/Onc Inpatient Note    Patient Name: Lencho Torres   YOB: 1955   Medical Record Number: [id number]   CSN: [id number]   Attending Hematology/Oncology Physician: Dr. Charlene Taylor      The 21st Century Cures Act makes medical notes like these available to patients in the interest of transparency. Please be advised this is a medical document. Medical documents are intended to carry relevant information, facts as evident, and the clinical opinion of the practitioner. The medical note is intended as peer to peer communication and may appear blunt or direct. It is written in medical language and may contain abbreviations or verbiage that are unfamiliar.     S: Patient is feeling better today, tells me that pain is slowly improving in abdomen and shoulder. Had HD yesterday. Tells me he had a BM today for the first time in 3 days.     Allergies:  Allergies[1]    Scheduled Medications:  Scheduled Medications[2]    PRN Medications:  PRN Medications[3]      Review of Systems: A comprehensive 10 point review of systems was completed.  Pertinent positives and negatives noted in the the HPI.    Physical Examination:   General: Patient is alert and oriented x 3, not in acute distress.  Vital Signs: /83 (BP Location: Left arm)   Pulse 103   Temp 97.3 °F (36.3 °C) (Oral)   Resp 21   Wt 76.9 kg (169 lb 9.6 oz)   SpO2 96%   BMI 23.00 kg/m²   HEENT: PERRL. Oropharynx is clear. (+) temp HD cath right side of neck  Chest: Respirations are unlabored  Heart: Regular rate and rhythm.   Abdomen: Soft, mildly tender with present bowel sounds.  Extremities:  No lower extremity edema.  Neurological: Grossly intact.   Psych/Depression: mood and affect are appropriate     Labs:  Recent Results (from the past 24 hours)   Hepatitis B Surface Antigen    Collection Time: 04/21/25  4:56 PM   Result Value Ref Range    Hepatitis B Surface Antigen Nonreactive Nonreactive     Hbsag Screen Index 0.871087    Basic Metabolic Panel  (8)    Collection Time: 04/21/25  4:56 PM   Result Value Ref Range    Glucose 152 (H) 70 - 99 mg/dL    Sodium 142 136 - 145 mmol/L    Potassium 4.1 3.5 - 5.1 mmol/L    Chloride 103 98 - 112 mmol/L    CO2 23.0 21.0 - 32.0 mmol/L    Anion Gap 16 0 - 18 mmol/L    BUN 66 (H) 9 - 23 mg/dL    Creatinine 1.02 0.70 - 1.30 mg/dL    Calcium, Total 7.4 (L) 8.7 - 10.6 mg/dL    Calculated Osmolality 316 (H) 275 - 295 mOsm/kg    eGFR-Cr 80 >=60 mL/min/1.73m2   Urinalysis, Routine    Collection Time: 04/21/25  8:03 PM   Result Value Ref Range    Urine Color Light-Yellow Yellow    Clarity Urine Ex.Turbid (A) Clear    Spec Gravity 1.013 1.005 - 1.030    Glucose Urine Normal Normal mg/dL    Bilirubin Urine Negative Negative    Ketones Urine Negative Negative mg/dL    Blood Urine 1+ (A) Negative    pH Urine 5.5 5.0 - 8.0    Protein Urine Trace (A) Negative mg/dL    Urobilinogen Urine Normal Normal mg/dL    Nitrite Urine Negative Negative    Leukocyte Esterase Urine Negative Negative    WBC Urine None Seen 0 - 5 /HPF    RBC Urine 6-10 (A) 0 - 2 /HPF    Bacteria Urine Rare (A) None Seen /HPF    Squamous Epi. Cells None Seen None Seen /HPF    Renal Tubular Epithelial Cells None Seen None Seen /HPF    Transitional Cells None Seen None Seen /HPF    Yeast Urine None Seen None Seen /HPF   Comp Metabolic Panel (14)    Collection Time: 04/22/25  3:17 AM   Result Value Ref Range    Glucose 129 (H) 70 - 99 mg/dL    Sodium 141 136 - 145 mmol/L    Potassium 3.8 3.5 - 5.1 mmol/L    Chloride 104 98 - 112 mmol/L    CO2 26.0 21.0 - 32.0 mmol/L    Anion Gap 11 0 - 18 mmol/L    BUN 42 (H) 9 - 23 mg/dL    Creatinine 1.07 0.70 - 1.30 mg/dL    Calcium, Total 7.5 (L) 8.7 - 10.6 mg/dL    Calculated Osmolality 304 (H) 275 - 295 mOsm/kg    eGFR-Cr 75 >=60 mL/min/1.73m2     (H) <34 U/L    ALT 50 (H) 10 - 49 U/L    Alkaline Phosphatase 176 (H) 45 - 117 U/L    Bilirubin, Total 0.4 0.2 - 1.1 mg/dL    Total Protein 5.1 (L) 5.7 - 8.2 g/dL    Albumin 3.3 3.2 -  4.8 g/dL    Globulin  1.8 (L) 2.0 - 3.5 g/dL    A/G Ratio 1.8 1.0 - 2.0   Phosphorus    Collection Time: 04/22/25  3:17 AM   Result Value Ref Range    Phosphorus 8.6 (H) 2.4 - 5.1 mg/dL   Uric Acid    Collection Time: 04/22/25  3:17 AM   Result Value Ref Range    Uric Acid 4.4 3.7 - 9.2 mg/dL       Radiology:    IR CENTRAL VENOUS ACCESS  Result Date: 4/21/2025  CONCLUSION: Successful placement of a temporary dialysis catheter via right internal jugular vein. Catheter is ready for use.  Recommend routine catheter care.   LOCATION:  [facility]    Dictated by (CST): Kaleigh Lam MD on 4/21/2025 at 9:55 AM     Finalized by (CST): Kaleigh Lam MD on 4/21/2025 at 9:55 AM           Impression/Plan    *DLBCL, arising from histologic transformation from [place]  High tumor burden with spontaneous tumor lysis syndrome requiring inpatient chemotherapy for ongoing close monitoring and management.  - PET/CT scan suggested possible left posterior fossa CNS disease, therefore MRI brain performed which does not appear to be as suspicious for this.  S/p LP 4/19 prior to starting chemo- CSF analysis pending, cell count unremarkable  - Started cycle 1 R-CHOP chemotherapy 4/19  acyclovir 400 mg BID   - Check CBC now     *Tumor lysis syndrome, hyperkalemia, hyperphosphatemia  - Presented with markedly elevated uric acid with renal insufficiency even prior to starting chemotherapy. S/p rasburicase 3 mg 4/18 and 6 mg 4/19 with improvement.    - Today has developed hyperkalemia, hyperphosphatemia related to tumor lysis syndrome upon starting chemotherapy.    - he is scheduled for HD session today. Lokelma, sevelamer  Monitor electrolytes closely.     *LISSETT  - Likely related to tumor lysis syndrome, hypercalcemia  - S/p HD 4/21/25  - Nephrology on consult     *Cancer related pain  - Extensive malignant peritoneal involvement causing pain.  - Oxycodone 5mg prn  - Improving slowly    Case discussed with Dr. Taylor, who is in agreement with  plan as outlined above.  Is this a shared or split note between Advanced Practice Provider and Physician? No      Please do not hesitate to reach out with any additional questions or concerns.    Electronically Signed by:    Elvia Mast DNP, AOELIESERP Nurse Practitioner  Hematology and Oncology                 [1]   Allergies  Allergen Reactions    Ibuprofen WHEEZING   [2]    sevelamer carbonate  800 mg Oral TID CC    allopurinol  300 mg Oral BID    acyclovir  400 mg Oral BID    predniSONE  100 mg Oral Daily with breakfast    [Held by provider] enoxaparin  40 mg Subcutaneous Daily   [3]   sodium chloride **AND** albumin human    acetaminophen    diphenhydrAMINE    prochlorperazine    melatonin    ondansetron    polyethylene glycol (PEG 3350)    sennosides    bisacodyl    fleet enema    HYDROcodone-acetaminophen    oxyCODONE

## 2025-04-22 NOTE — PLAN OF CARE
Pt is a&ox4, RA, VSS. Received dialysis, no output per dialysis RN. C/o abdominal pain, PRN pain meds per MAR. IVF infusing. Tolerating diet. Standby assist. Pt is resting in bed w/ call light in reach, safety precautions in place.    Problem: GASTROINTESTINAL - ADULT  Goal: Maintains adequate nutritional intake (undernourished)  Description: INTERVENTIONS:- Monitor percentage of each meal consumed- Identify factors contributing to decreased intake, treat as appropriate- Assist with meals as needed- Monitor I&O, WT and lab values- Obtain nutritional consult as needed- Optimize oral hygiene and moisture- Encourage food from home; allow for food preferences- Enhance eating environment  Outcome: Progressing     Problem: METABOLIC/FLUID AND ELECTROLYTES - ADULT  Goal: Electrolytes maintained within normal limits  Description: INTERVENTIONS:- Monitor labs and rhythm and assess patient for signs and symptoms of electrolyte imbalances- Administer electrolyte replacement as ordered- Monitor response to electrolyte replacements, including rhythm and repeat lab results as appropriate- Fluid restriction as ordered- Instruct patient on fluid and nutrition restrictions as appropriate  Outcome: Progressing  Goal: Hemodynamic stability and optimal renal function maintained  Description: INTERVENTIONS:- Monitor labs and assess for signs and symptoms of volume excess or deficit- Monitor intake, output and patient weight- Monitor urine specific gravity, serum osmolarity and serum sodium as indicated or ordered- Monitor response to interventions for patient's volume status, including labs, urine output, blood pressure (other measures as available)- Encourage oral intake as appropriate- Instruct patient on fluid and nutrition restrictions as appropriate  Outcome: Progressing     Problem: SKIN/TISSUE INTEGRITY - ADULT  Goal: Incision(s), wounds(s) or drain site(s) healing without S/S of infection  Description: INTERVENTIONS:- Assess and  document risk factors for pressure ulcer development- Assess and document skin integrity- Assess and document dressing/incision, wound bed, drain sites and surrounding tissue- Implement wound care per orders- Initiate isolation precautions as appropriate- Initiate Pressure Ulcer prevention bundle as indicated  Outcome: Progressing     Problem: HEMATOLOGIC - ADULT  Goal: Maintains hematologic stability  Description: INTERVENTIONS- Assess for signs and symptoms of bleeding or hemorrhage- Monitor labs and vital signs for trends- Administer supportive blood products/factors, fluids and medications as ordered and appropriate- Administer supportive blood products/factors as ordered and appropriate  Outcome: Progressing     Problem: PAIN - ADULT  Goal: Verbalizes/displays adequate comfort level or patient's stated pain goal  Description: INTERVENTIONS:- Encourage pt to monitor pain and request assistance- Assess pain using appropriate pain scale- Administer analgesics based on type and severity of pain and evaluate response- Implement non-pharmacological measures as appropriate and evaluate response- Consider cultural and social influences on pain and pain management- Manage/alleviate anxiety- Utilize distraction and/or relaxation techniques- Monitor for opioid side effects- Notify MD/LIP if interventions unsuccessful or patient reports new pain- Anticipate increased pain with activity and pre-medicate as appropriate  Outcome: Progressing

## 2025-04-22 NOTE — PROGRESS NOTES
[facility]   part of [facility]     Hospitalist Progress Note     Lencho Torres Patient Status:  Inpatient    1955 MRN [id number]   Location [facility] [place] 4NW-A Attending Paul Yao, DO   Hosp Day # 4 PCP Shayan Harris MD     Chief Complaint: Direct admission for chemotherapy    Subjective:     Resting in bed, feeling much better after dialysis session yesterday.     Objective:    Review of Systems:   A comprehensive review of systems was completed; pertinent positive and negatives stated in subjective.    Vital signs:  Temp:  [96.7 °F (35.9 °C)-98 °F (36.7 °C)] 97.7 °F (36.5 °C)  Pulse:  [] 92  Resp:  [16-20] 20  BP: (120-135)/(76-93) 135/82  SpO2:  [91 %-97 %] 97 %    Physical Exam:    General: No acute distress  Respiratory: No wheezes, no rhonchi  Cardiovascular: S1, S2, regular rate and rhythm  Abdomen: Soft, Non-tender, +distended, positive bowel sounds  Neuro: No new focal deficits.   Extremities: No edema    Diagnostic Data:    Labs:  Recent Labs   Lab 25  1653 25  0549 25  0511 25  0453   WBC 10.2 11.1* 11.3* 9.0   HGB 15.4 14.8 14.0 12.6*   MCV 91.2 89.7 92.4 92.0   .0 218.0 174.0 137.0*   BAND  --   --  5  --    INR  --  1.12  --   --        Recent Labs   Lab 25  0511 25  0633 25  0453 25  1656 25  0317   *   < > 224* 152* 129*   BUN 41*   < > 77* 66* 42*   CREATSERUM 1.29   < > 1.42* 1.02 1.07   CA 9.7   < > 6.9* 7.4* 7.5*   ALB 3.7  --  3.4  --  3.3   *   < > 140 142 141   K 6.9*   < > 5.8* 4.1 3.8   CL 99   < > 102 103 104   CO2 21.0   < > 20.0* 23.0 26.0   ALKPHO 106  --  209*  --  176*   AST 67*  --  377*  --  167*   ALT 27  --  63*  --  50*   BILT 0.4  --  0.3  --  0.4   TP 5.8  --  5.3*  --  5.1*    < > = values in this interval not displayed.       Estimated Glomerular Filtration Rate: 75 mL/min/1.73m2 (result from lab).    No results for input(s): \"TROP\", \"TROPHS\", \"CK\" in the last 168  hours.    Recent Labs   Lab 04/19/25  0549   PTP 14.5   INR 1.12        Microbiology    No results found for this visit on 04/18/25.      Imaging: Reviewed in Epic.    Medications: Scheduled Medications[1]    Assessment & Plan:      #Diffuse large B cell lymphoma  -acyclovir, allopurinol, steroids  -MRI brain shows no metastatic disease, high FLAIR signal abnormality in left frontal lobe  -S/p LP on 4/19 prior to starting chemo  -CSF labs pending  -Started cycle 1 R-CHOP chemotherapy on 4/19  -s/p rituximab  -oncology following    #Tumor lysis syndrome  #Hyperkalemia  #Hyperphosphatemia  -S/p rasburicase on 4/18 and 4/19  -IV fluids  -Allopurinol  -Calcium gluconate, insulin, D50, Lokelma  -temporary catheter placed for HD 4/21 > s/p HD 4/21  -Nephrology following    #LISSETT, d/t tumor lysis syndrome/hypercalcemia   -follow BMP  -IV fluids  -HD as above  -nephrology following     #Hypercalcemia d/t TLS  -IVF, HD, management as above   -Nephrology following    #Cancer-related pain  -Oxycodone as needed    #HTN  -controlled  -not on medications    Lauren Dueñas, DO      Supplementary Documentation:     Quality:  DVT Mechanical Prophylaxis:   SCDs,    DVT Pharmacologic Prophylaxis   Medication    [Held by provider] enoxaparin (Lovenox) 40 MG/0.4ML SUBQ injection 40 mg                Code Status: Not on file  Crowley: No urinary catheter in place  Crowley Duration (in days):   Central line:    KAY:     Discharge is dependent on: Clinical improvement  At this point Mr. Torres is expected to be discharge to: Home    The 21st Century Cures Act makes medical notes like these available to patients in the interest of transparency. Please be advised this is a medical document. Medical documents are intended to carry relevant information, facts as evident, and the clinical opinion of the practitioner. The medical note is intended as peer to peer communication and may appear blunt or direct. It is written in medical language and  may contain abbreviations or verbiage that are unfamiliar.     Dietitian Malnutrition Assessment    Evaluation for Malnutrition:                       RD Malnutrition Care Plan:      Body Fat/Muscle Mass:          Physician Assessment     Patient has a diagnosis of moderate malnutrition        **Certification      PHYSICIAN Certification of Need for Inpatient Hospitalization - Initial Certification    Patient will require inpatient services that will reasonably be expected to span two midnight's based on the clinical documentation in H+P.   Based on patients current state of illness, I anticipate that, after discharge, patient will require TBD.           [1]    sevelamer carbonate  800 mg Oral TID CC    allopurinol  300 mg Oral BID    acyclovir  400 mg Oral BID    predniSONE  100 mg Oral Daily with breakfast    [Held by provider] enoxaparin  40 mg Subcutaneous Daily

## 2025-04-23 PROBLEM — R97.20 ELEVATED PSA: Status: ACTIVE | Noted: 2025-04-23

## 2025-04-23 PROBLEM — R97.20 ELEVATED PSA: Status: ACTIVE | Noted: 2025-01-01

## 2025-04-23 NOTE — PLAN OF CARE
Pt is a/o x4, able to make needs known. Continue with renvela as scheduled. Cont with hydration through IVF. Appetite fair. Lasix IV once today for BLE edema. Ambulates no device with modified independence. Pain is control, pt declines pain medication today. Appetite fair. Compliant per MAR. RA. Tele. Denies SOB/chest pain. Fall precaution in place. Call light within pt's reach.      Problem: GASTROINTESTINAL - ADULT  Goal: Maintains adequate nutritional intake (undernourished)  Description: INTERVENTIONS:- Monitor percentage of each meal consumed- Identify factors contributing to decreased intake, treat as appropriate- Assist with meals as needed- Monitor I&O, WT and lab values- Obtain nutritional consult as needed- Optimize oral hygiene and moisture- Encourage food from home; allow for food preferences- Enhance eating environment  Outcome: Progressing     Problem: METABOLIC/FLUID AND ELECTROLYTES - ADULT  Goal: Electrolytes maintained within normal limits  Description: INTERVENTIONS:- Monitor labs and rhythm and assess patient for signs and symptoms of electrolyte imbalances- Administer electrolyte replacement as ordered- Monitor response to electrolyte replacements, including rhythm and repeat lab results as appropriate- Fluid restriction as ordered- Instruct patient on fluid and nutrition restrictions as appropriate  Outcome: Progressing  Goal: Hemodynamic stability and optimal renal function maintained  Description: INTERVENTIONS:- Monitor labs and assess for signs and symptoms of volume excess or deficit- Monitor intake, output and patient weight- Monitor urine specific gravity, serum osmolarity and serum sodium as indicated or ordered- Monitor response to interventions for patient's volume status, including labs, urine output, blood pressure (other measures as available)- Encourage oral intake as appropriate- Instruct patient on fluid and nutrition restrictions as appropriate  Outcome: Progressing      Problem: SKIN/TISSUE INTEGRITY - ADULT  Goal: Incision(s), wounds(s) or drain site(s) healing without S/S of infection  Description: INTERVENTIONS:- Assess and document risk factors for pressure ulcer development- Assess and document skin integrity- Assess and document dressing/incision, wound bed, drain sites and surrounding tissue- Implement wound care per orders- Initiate isolation precautions as appropriate- Initiate Pressure Ulcer prevention bundle as indicated  Outcome: Progressing     Problem: HEMATOLOGIC - ADULT  Goal: Maintains hematologic stability  Description: INTERVENTIONS- Assess for signs and symptoms of bleeding or hemorrhage- Monitor labs and vital signs for trends- Administer supportive blood products/factors, fluids and medications as ordered and appropriate- Administer supportive blood products/factors as ordered and appropriate  Outcome: Progressing     Problem: PAIN - ADULT  Goal: Verbalizes/displays adequate comfort level or patient's stated pain goal  Description: INTERVENTIONS:- Encourage pt to monitor pain and request assistance- Assess pain using appropriate pain scale- Administer analgesics based on type and severity of pain and evaluate response- Implement non-pharmacological measures as appropriate and evaluate response- Consider cultural and social influences on pain and pain management- Manage/alleviate anxiety- Utilize distraction and/or relaxation techniques- Monitor for opioid side effects- Notify MD/LIP if interventions unsuccessful or patient reports new pain- Anticipate increased pain with activity and pre-medicate as appropriate  Outcome: Progressing

## 2025-04-23 NOTE — PROGRESS NOTES
[facility]   part of [facility]     Hospitalist Progress Note     Lencho Torres Patient Status:  Inpatient    1955 MRN [id number]   Location [facility] 4NW-A Attending Paul Yao, DO   Hosp Day # 5 PCP Shayan Harris MD     Chief Complaint: Direct admission for chemotherapy    Subjective:     Feeling better today, abdominal distention and pain are improving. Having some increased LE swelling, otherwise, no new complaints     Objective:    Review of Systems:   A comprehensive review of systems was completed; pertinent positive and negatives stated in subjective.    Vital signs:  Temp:  [97 °F (36.1 °C)-97.9 °F (36.6 °C)] 97.9 °F (36.6 °C)  Pulse:  [] 105  Resp:  [12-21] 20  BP: (119-136)/(83-94) 136/92  SpO2:  [96 %-100 %] 97 %    Physical Exam:    General: No acute distress  Respiratory: No wheezes, no rhonchi  Cardiovascular: S1, S2, regular rate and rhythm  Abdomen: Soft, Non-tender, +distended, positive bowel sounds  Neuro: No new focal deficits.   Extremities: trace pitting BLLE edema    Diagnostic Data:    Labs:  Recent Labs   Lab 25  1653 25  0549 25  0511 25  0453 25  1119   WBC 10.2 11.1* 11.3* 9.0 5.1   HGB 15.4 14.8 14.0 12.6* 12.5*   MCV 91.2 89.7 92.4 92.0 89.4   .0 218.0 174.0 137.0* 84.0*   BAND  --   --  5  --   --    INR  --  1.12  --   --   --        Recent Labs   Lab 25  0511 25  0633 25  0453 25  1656 25  0317 25  0550   *   < > 224* 152* 129* 128*   BUN 41*   < > 77* 66* 42* 58*   CREATSERUM 1.29   < > 1.42* 1.02 1.07 1.90*   CA 9.7   < > 6.9* 7.4* 7.5* 6.6*   ALB 3.7  --  3.4  --  3.3  --    *   < > 140 142 141 139   K 6.9*   < > 5.8* 4.1 3.8 3.9   CL 99   < > 102 103 104 104   CO2 21.0   < > 20.0* 23.0 26.0 24.0   ALKPHO 106  --  209*  --  176*  --    AST 67*  --  377*  --  167*  --    ALT 27  --  63*  --  50*  --    BILT 0.4  --  0.3  --  0.4  --    TP 5.8  --  5.3*  --  5.1*  --      < > = values in this interval not displayed.       Estimated Glomerular Filtration Rate: 38 mL/min/1.73m2 (A) (result from lab).    No results for input(s): \"TROP\", \"TROPHS\", \"CK\" in the last 168 hours.    Recent Labs   Lab 04/19/25  0549   PTP 14.5   INR 1.12        Microbiology    No results found for this visit on 04/18/25.      Imaging: Reviewed in Epic.    Medications: Scheduled Medications[1]    Assessment & Plan:      #Diffuse large B cell lymphoma  -acyclovir, allopurinol, steroids  -MRI brain shows no metastatic disease, high FLAIR signal abnormality in left frontal lobe  -S/p LP on 4/19 prior to starting chemo  -CSF final results pending  -Started cycle 1 R-CHOP chemotherapy on 4/19  -s/p rituximab  -oncology following    #Tumor lysis syndrome  #Hyperkalemia  #Hyperphosphatemia  -S/p rasburicase on 4/18 and 4/19  -IV fluids  -Allopurinol  -Calcium gluconate, insulin, D50, Lokelma  -temporary catheter placed for HD 4/21 > s/p HD 4/21  -Nephrology following    #LISSETT, d/t tumor lysis syndrome/hypercalcemia   -follow BMP  -IV fluids  -HD as above  -nephrology following     #BLLE edema  -PRN laisx     #Hypercalcemia d/t TLS  -IVF, HD, management as above   -Nephrology following    #Cancer-related pain  -Oxycodone as needed    #HTN  -controlled  -not on medications    Lauren Dueñas,       Supplementary Documentation:     Quality:  DVT Mechanical Prophylaxis:   SCDs,    DVT Pharmacologic Prophylaxis   Medication    [Held by provider] enoxaparin (Lovenox) 40 MG/0.4ML SUBQ injection 40 mg                Code Status: Not on file  Crowley: No urinary catheter in place  Crowley Duration (in days):   Central line:    KAY:     Discharge is dependent on: Clinical improvement  At this point Mr. Torres is expected to be discharge to: Home    The 21st Century Cures Act makes medical notes like these available to patients in the interest of transparency. Please be advised this is a medical document. Medical documents are  intended to carry relevant information, facts as evident, and the clinical opinion of the practitioner. The medical note is intended as peer to peer communication and may appear blunt or direct. It is written in medical language and may contain abbreviations or verbiage that are unfamiliar.     Dietitian Malnutrition Assessment    Evaluation for Malnutrition:                       RD Malnutrition Care Plan:      Body Fat/Muscle Mass:          Physician Assessment     Patient has a diagnosis of moderate malnutrition        **Certification      PHYSICIAN Certification of Need for Inpatient Hospitalization - Initial Certification    Patient will require inpatient services that will reasonably be expected to span two midnight's based on the clinical documentation in H+P.   Based on patients current state of illness, I anticipate that, after discharge, patient will require TBD.           [1]    furosemide  20 mg Intravenous Once    sevelamer carbonate  800 mg Oral TID CC    allopurinol  300 mg Oral BID    acyclovir  400 mg Oral BID    predniSONE  100 mg Oral Daily with breakfast    [Held by provider] enoxaparin  40 mg Subcutaneous Daily

## 2025-04-23 NOTE — PROGRESS NOTES
[facility]   part of [facility]     Nephrology Progress Note    Lencho Torres Patient Status:  Inpatient    1955 MRN [id number]   Location [facility] 4NW-A Attending Lauren Dueñas, DO   Hosp Day # 5 PCP Shayan Harris MD       SUBJECTIVE:    No acute events; states he feels a bit puffy  Denies any urinary problems      Current Hospital Medications[1]      Physical Exam:   BP (!) 136/92 (BP Location: Left arm)   Pulse 105   Temp 97.9 °F (36.6 °C) (Oral)   Resp 20   Wt 169 lb 9.6 oz (76.9 kg)   SpO2 97%   BMI 23.00 kg/m²   Temp (24hrs), Av.5 °F (36.4 °C), Min:97 °F (36.1 °C), Max:97.9 °F (36.6 °C)       Intake/Output Summary (Last 24 hours) at 2025 0741  Last data filed at 2025 2200  Gross per 24 hour   Intake --   Output 350 ml   Net -350 ml     Last 3 Weights   25 0440 169 lb 9.6 oz (76.9 kg)   25 0500 168 lb 3.2 oz (76.3 kg)   25 0700 164 lb 7.4 oz (74.6 kg)   25 1341 147 lb 3.2 oz (66.8 kg)   25 1000 166 lb 3.6 oz (75.4 kg)   25 1314 166 lb 3.2 oz (75.4 kg)   25 1915 178 lb 9.6 oz (81 kg)   25 1731 176 lb (79.8 kg)   25 1303 176 lb (79.8 kg)   25 1109 176 lb 6.4 oz (80 kg)   22 1013 174 lb 3.2 oz (79 kg)     General: Alert and oriented in no apparent distress.  HEENT: No scleral icterus, MMM  Neck: Supple, no DULCE or thyromegaly  Cardiac: Regular rate and rhythm, S1, S2 normal, no murmur or rub  Lungs: Clear without wheezes, rales, rhonchi.    Abdomen: Soft, non-tender. + bowel sounds, no palpable organomegaly  Extremities: Without clubbing, cyanosis or edema.  Neurologic:  moving all extremities  Skin: Warm and dry, no rash    Recent Labs     25  0453 25  1119   WBC 9.0 5.1   HGB 12.6* 12.5*   MCV 92.0 89.4   .0* 84.0*       Recent Labs     25  1401 25  2129 25  0453 25  1656 25  0317 25  0550    138 140 142 141 139   K 4.7 6.0* 5.8* 4.1 3.8  3.9    101 102 103 104 104   CO2 19.0* 20.0* 20.0* 23.0 26.0 24.0   BUN 58* 80* 77* 66* 42* 58*   CREATSERUM 1.02 1.34* 1.42* 1.02 1.07 1.90*   CA 7.6* 7.6* 6.9* 7.4* 7.5* 6.6*   MG  --   --   --   --   --  2.4   PHOS 10.4*  --  17.4*  --  8.6* 9.3*       Recent Labs     04/21/25  0453 04/22/25  0317   ALT 63* 50*   * 167*   ALB 3.4 3.3   LDH >4,500*  --            Impression/Plan:      1.Hyperkalemia: Due to severe TLS. Was admitted for chemo initiation in setting of newly diagnosed aggressive DLBCL with high tumor burden. Received chemo few day ago --> significant hyperkalemia--> s/p HD w/ imrovement   - monitor closely      2.Hyperphos: 2/2 above. S/p HD + on phos binder (renvela); monitor       3. DLBCL: admitted for R-CHOP, received vincristine, cytoxan and doxorubicin 4/19. S/p Rituxam - per heme.     4. Hyperuricemia: 2/2 TLS. S/p rasburicase x2. Improved.      5. Acute kidney injury: baseline serum creatinine ~0.8-0.9 mg/dL. Worsening serum creatinine 2/2 likely TLS and hypercalcemia. Relatively stable today.     - Continue aggressive hydration and monitor ; dose w/ lasix x 1 - 20 mg    Questions/concerns were discussed with patient and/or family by bedside.       Rodríguez Borja  4/23/2025         [1]   Current Facility-Administered Medications   Medication Dose Route Frequency    sodium chloride 0.9 % IV bolus 100 mL  100 mL Intravenous Q30 Min PRN    And    albumin human (Albumin) 25% injection 25 g  25 g Intravenous PRN Dialysis    sodium chloride 0.9% infusion   Intravenous Continuous    sevelamer carbonate (Renvela) tab 800 mg  800 mg Oral TID CC    allopurinol (Zyloprim) tab 300 mg  300 mg Oral BID    acetaminophen (Tylenol) tab 650 mg  650 mg Oral Once PRN    diphenhydrAMINE (Benadryl) 50 mg/mL  injection 50 mg  50 mg Intravenous Once PRN    prochlorperazine (Compazine) 10 MG/2ML injection 10 mg  10 mg Intravenous Q6H PRN    acyclovir (Zovirax) tab 400 mg  400 mg Oral BID    predniSONE  (Deltasone) tab 100 mg  100 mg Oral Daily with breakfast    melatonin tab 3 mg  3 mg Oral Nightly PRN    ondansetron (Zofran) 4 MG/2ML injection 4 mg  4 mg Intravenous Q6H PRN    [Held by provider] enoxaparin (Lovenox) 40 MG/0.4ML SUBQ injection 40 mg  40 mg Subcutaneous Daily    polyethylene glycol (PEG 3350) (Miralax) 17 g oral packet 17 g  17 g Oral Daily PRN    sennosides (Senokot) tab 17.2 mg  17.2 mg Oral Nightly PRN    bisacodyl (Dulcolax) 10 MG rectal suppository 10 mg  10 mg Rectal Daily PRN    fleet enema (Fleet) rectal enema 133 mL  1 enema Rectal Once PRN    HYDROcodone-acetaminophen (Norco) 5-325 MG per tab 1 tablet  1 tablet Oral Q6H PRN    oxyCODONE immediate release tab 5 mg  5 mg Oral Q4H PRN    dextrose 5%-sodium chloride 0.9% infusion   Intravenous Continuous

## 2025-04-23 NOTE — PROGRESS NOTES
Hem/Onc Inpatient Note    Patient Name: Lencho Torres   YOB: 1955   Medical Record Number: [id number]   CSN: [id number]   Attending Hematology/Oncology Physician: Dr. Charlene Taylor      The 21st Century Cures Act makes medical notes like these available to patients in the interest of transparency. Please be advised this is a medical document. Medical documents are intended to carry relevant information, facts as evident, and the clinical opinion of the practitioner. The medical note is intended as peer to peer communication and may appear blunt or direct. It is written in medical language and may contain abbreviations or verbiage that are unfamiliar.     S:   Pt in bed. Feeling well. Has intermittent abdominal pain, ranges from 4-9/10, currently 6/10. +abdominal distension, however reports this is much improved.  Had lower extremity swelling this morning. Nephrologist ordered lasix, seems to improved swelling  Urinating frequently  Since yesterday, he has been having numbness of his lower lip  Had 2 bowel movements this AM.  No bleeding.      Allergies:  Allergies[1]    Scheduled Medications:  Scheduled Medications[2]    PRN Medications:  PRN Medications[3]      Review of Systems: A comprehensive 10 point review of systems was completed.  Pertinent positives and negatives noted in the the HPI.    Physical Examination:   General: Patient is alert and oriented x 3, not in acute distress.  Vital Signs: /68 (BP Location: Left arm)   Pulse 101   Temp 97.7 °F (36.5 °C) (Oral)   Resp 18   Wt 76.9 kg (169 lb 9.6 oz)   SpO2 96%   BMI 23.00 kg/m²   Mouth: no signs of infection  Chest: Comfortable on room air  Abdomen: distended, non tender  Extremities: mild lower extremity edema  Neurological: Grossly intact.   Psych/Depression: mood and affect are appropriate     Labs:  Recent Results (from the past 24 hours)   CBC With Differential With Platelet    Collection Time: 04/22/25 11:19 AM   Result Value  Ref Range    WBC 5.1 4.0 - 11.0 x10(3) uL    RBC 4.07 3.80 - 5.80 x10(6)uL    HGB 12.5 (L) 13.0 - 17.5 g/dL    HCT 36.4 (L) 39.0 - 53.0 %    PLT 84.0 (L) 150.0 - 450.0 10(3)uL    Immature Platelet Fraction 2.5 0.0 - 7.0 %    MCV 89.4 80.0 - 100.0 fL    MCH 30.7 26.0 - 34.0 pg    MCHC 34.3 31.0 - 37.0 g/dL    RDW 11.8 %    Neutrophil Absolute Prelim 4.84 1.50 - 7.70 x10 (3) uL    Neutrophil Absolute 4.84 1.50 - 7.70 x10(3) uL    Lymphocyte Absolute 0.13 (L) 1.00 - 4.00 x10(3) uL    Monocyte Absolute 0.08 (L) 0.10 - 1.00 x10(3) uL    Eosinophil Absolute 0.01 0.00 - 0.70 x10(3) uL    Basophil Absolute 0.01 0.00 - 0.20 x10(3) uL    Immature Granulocyte Absolute 0.03 0.00 - 1.00 x10(3) uL    Neutrophil % 94.9 %    Lymphocyte % 2.5 %    Monocyte % 1.6 %    Eosinophil % 0.2 %    Basophil % 0.2 %    Immature Granulocyte % 0.6 %   Scan slide    Collection Time: 04/22/25 11:19 AM   Result Value Ref Range    Slide Review       Platelets reviewed on smear. No giant platelets or platelet clumps observed.   Basic Metabolic Panel (8)    Collection Time: 04/23/25  5:50 AM   Result Value Ref Range    Glucose 128 (H) 70 - 99 mg/dL    Sodium 139 136 - 145 mmol/L    Potassium 3.9 3.5 - 5.1 mmol/L    Chloride 104 98 - 112 mmol/L    CO2 24.0 21.0 - 32.0 mmol/L    Anion Gap 11 0 - 18 mmol/L    BUN 58 (H) 9 - 23 mg/dL    Creatinine 1.90 (H) 0.70 - 1.30 mg/dL    Calcium, Total 6.6 (L) 8.7 - 10.6 mg/dL    Calculated Osmolality 306 (H) 275 - 295 mOsm/kg    eGFR-Cr 38 (L) >=60 mL/min/1.73m2   Magnesium    Collection Time: 04/23/25  5:50 AM   Result Value Ref Range    Magnesium 2.4 1.6 - 2.6 mg/dL   Phosphorus    Collection Time: 04/23/25  5:50 AM   Result Value Ref Range    Phosphorus 9.3 (HH) 2.4 - 5.1 mg/dL   Uric Acid    Collection Time: 04/23/25  5:50 AM   Result Value Ref Range    Uric Acid 5.6 3.7 - 9.2 mg/dL       Radiology:    IR CENTRAL VENOUS ACCESS  Result Date: 4/21/2025  CONCLUSION: Successful placement of a temporary dialysis  catheter via right internal jugular vein. Catheter is ready for use.  Recommend routine catheter care.   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  - PET/CT scan suggested possible left posterior fossa CNS disease, therefore MRI brain performed which does not appear to be as suspicious for this.    - S/p LP 4/19 prior to starting chemo- CSF analysis with no cytologic evidence of malignancy  - S/p cycle 1 R-CHOP chemotherapy 4/19  - Continue acyclovir 400 mg BID     *Tumor lysis syndrome, hyperkalemia, hyperphosphatemia, hypocalcemia  - Presented with markedly elevated uric acid with renal insufficiency even prior to starting chemotherapy.   - S/p rasburicase 3 mg 4/18 and 6 mg 4/19 and uric acid now normal.   - Hyperphosphatemia: continue Renvela  - Potassium normal today  - Hypocalcemia, with lower lip numbness. Replace with 2g IV Ca, OK With nephrology   - Monitor electrolytes closely.  - Nephro on board, appreciate aid in management     *LISSETT  - Likely related to tumor lysis syndrome, hypercalcemia  - S/p HD 4/21/25  - Nephrology on consult  - Continue aggressive hydration. Given dose of lasix this AM for BLE swelling with improvement    *Cancer related pain  - Extensive malignant peritoneal involvement causing pain.  - Oxycodone 5mg prn  - Improving slowly    *Elevated PSA  - Patient has outpatient MRI prostate scheduled 4/25 per urologist for elevated PSA  - Advised patient to reschedule for a few weeks. Made urology team aware.    Case discussed with Dr. Taylor, who is in agreement with plan as outlined above.  Is this a shared or split note between Advanced Practice Provider and  Physician? No     Please do not hesitate to reach out with any additional questions or concerns.    Electronically Signed by:    Joana VILLEGAS PA-C Physician Assistant  Hematology and Oncology                  [1]   Allergies  Allergen Reactions    Ibuprofen WHEEZING   [2]    sevelamer carbonate  800 mg Oral TID CC    allopurinol  300 mg Oral BID    acyclovir  400 mg Oral BID    [Held by provider] enoxaparin  40 mg Subcutaneous Daily   [3]   acetaminophen    diphenhydrAMINE    prochlorperazine    melatonin    ondansetron    polyethylene glycol (PEG 3350)    sennosides    bisacodyl    fleet enema    HYDROcodone-acetaminophen    oxyCODONE

## 2025-04-23 NOTE — PLAN OF CARE
Pt is alert and oriented x4, RA, VSS. States he is feeling much better, appetite improving. Critical phosphorus of 9.3 this AM, MD notified. Minimal pain to abdomen. IVF infusing 0.9 @150ml/hr. Up with standby assist, voiding. Pt is resting in bed with call light in reach. Safety precautions in place.    Problem: GASTROINTESTINAL - ADULT  Goal: Maintains adequate nutritional intake (undernourished)  Description: INTERVENTIONS:- Monitor percentage of each meal consumed- Identify factors contributing to decreased intake, treat as appropriate- Assist with meals as needed- Monitor I&O, WT and lab values- Obtain nutritional consult as needed- Optimize oral hygiene and moisture- Encourage food from home; allow for food preferences- Enhance eating environment  Outcome: Progressing     Problem: METABOLIC/FLUID AND ELECTROLYTES - ADULT  Goal: Electrolytes maintained within normal limits  Description: INTERVENTIONS:- Monitor labs and rhythm and assess patient for signs and symptoms of electrolyte imbalances- Administer electrolyte replacement as ordered- Monitor response to electrolyte replacements, including rhythm and repeat lab results as appropriate- Fluid restriction as ordered- Instruct patient on fluid and nutrition restrictions as appropriate  Outcome: Progressing  Goal: Hemodynamic stability and optimal renal function maintained  Description: INTERVENTIONS:- Monitor labs and assess for signs and symptoms of volume excess or deficit- Monitor intake, output and patient weight- Monitor urine specific gravity, serum osmolarity and serum sodium as indicated or ordered- Monitor response to interventions for patient's volume status, including labs, urine output, blood pressure (other measures as available)- Encourage oral intake as appropriate- Instruct patient on fluid and nutrition restrictions as appropriate  Outcome: Progressing     Problem: SKIN/TISSUE INTEGRITY - ADULT  Goal: Incision(s), wounds(s) or drain site(s)  healing without S/S of infection  Description: INTERVENTIONS:- Assess and document risk factors for pressure ulcer development- Assess and document skin integrity- Assess and document dressing/incision, wound bed, drain sites and surrounding tissue- Implement wound care per orders- Initiate isolation precautions as appropriate- Initiate Pressure Ulcer prevention bundle as indicated  Outcome: Progressing     Problem: HEMATOLOGIC - ADULT  Goal: Maintains hematologic stability  Description: INTERVENTIONS- Assess for signs and symptoms of bleeding or hemorrhage- Monitor labs and vital signs for trends- Administer supportive blood products/factors, fluids and medications as ordered and appropriate- Administer supportive blood products/factors as ordered and appropriate  Outcome: Progressing     Problem: PAIN - ADULT  Goal: Verbalizes/displays adequate comfort level or patient's stated pain goal  Description: INTERVENTIONS:- Encourage pt to monitor pain and request assistance- Assess pain using appropriate pain scale- Administer analgesics based on type and severity of pain and evaluate response- Implement non-pharmacological measures as appropriate and evaluate response- Consider cultural and social influences on pain and pain management- Manage/alleviate anxiety- Utilize distraction and/or relaxation techniques- Monitor for opioid side effects- Notify MD/LIP if interventions unsuccessful or patient reports new pain- Anticipate increased pain with activity and pre-medicate as appropriate  Outcome: Progressing

## 2025-04-24 PROBLEM — Z51.5 PALLIATIVE CARE ENCOUNTER: Status: ACTIVE | Noted: 2025-04-24

## 2025-04-24 PROBLEM — Z51.5 PALLIATIVE CARE ENCOUNTER: Status: ACTIVE | Noted: 2025-01-01

## 2025-04-24 PROBLEM — F32.A DEPRESSION: Status: ACTIVE | Noted: 2025-04-24

## 2025-04-24 PROBLEM — F43.23 SITUATIONAL MIXED ANXIETY AND DEPRESSIVE DISORDER: Status: ACTIVE | Noted: 2025-04-24

## 2025-04-24 PROBLEM — F32.A DEPRESSION: Status: ACTIVE | Noted: 2025-01-01

## 2025-04-24 PROBLEM — F43.23 SITUATIONAL MIXED ANXIETY AND DEPRESSIVE DISORDER: Status: ACTIVE | Noted: 2025-01-01

## 2025-04-24 NOTE — DIETARY NOTE
[facility]   part of [facility]    NUTRITION ASSESSMENT    Pt meets moderate malnutrition criteria at this time.    NUTRITION INTERVENTION:    Meal and Snacks - Monitor PO intake  Medical Food Supplements - Pt not interested at this time, canceled standing orders. Rationale/use for oral supplements discussed.      PATIENT STATUS:    4/24/25 Brief follow up with patient, pt frustrated this am. Doesn't like Ensure Clear or Plus and requested discontinuing them. Reports not wanting to eat or drink anything. PO intake documented is variable; taking 0-100% of meals.   Noted pt with tumor lysis syndrome; K WNL, hyperphosphatemia (7.8); getting renvela, requiring HD.     04/19/25 69 year old male admitted 4/18. Pt has new diagnosis of diffuse large B cell lymphoma and is a direct admission to receive cycle 1 of R-chop  Consult received today for wt loss.  Visited patient in room, reports decreased PO intake/appetite. +Bloating with meals. Likes to drink smoothies at home. Ensure +HP order by MD, modified order to BID as pt states he wont take more than that.  Now with 12 lbs/6% weight loss x 1 month-significant per standards.    Pmhx - Includes HTN, folicular lymphoma of scalp.    ANTHROPOMETRICS:  Ht: 182.9 cm (6')  Wt: 76.9 kg (169 lb 9.6 oz).   BMI: Body mass index is 23 kg/m².  IBW: 80.9 kg      WEIGHT HISTORY:   Weight loss: Yes, Severe Wt loss of 12 lbs, 6%, over 1 months       Wt Readings from Last 10 Encounters:   04/21/25 76.9 kg (169 lb 9.6 oz)   04/17/25 75.4 kg (166 lb 3.2 oz)   04/09/25 81 kg (178 lb 9.6 oz)   04/08/25 80 kg (176 lb 6.4 oz)   09/01/22 79 kg (174 lb 3.2 oz)   02/07/22 83 kg (183 lb)   07/19/21 87.5 kg (193 lb)   07/26/16 86.2 kg (190 lb)   02/29/16 87.1 kg (192 lb)   02/23/15 87.1 kg (192 lb)        NUTRITION:  Diet:       Procedures    Regular/General diet Is Patient on Accuchecks? No      Food Allergies: No  Cultural/Ethnic/Jew Preferences Addressed: Yes    Percent Meals Eaten  (last 3 days)       Date/Time Percent Meals Eaten (%)    04/21/25 0800 25 %    04/21/25 1300 100 %    04/21/25 1900 25 %    04/23/25 0823 100 %    04/23/25 1212 0 %    04/23/25 1808 75 %            GI system review: abdominal pain and distention Last BM Date: 4/23/25  Skin and wounds: intact    NUTRITION RELATED PHYSICAL FINDINGS:     1. Body Fat/Muscle Mass: mild depletion body fat Orbital fat pad, Buccal fat pad, and Triceps and mild muscle depletion Temple region, Clavicle region, and Shoulder/Acromion process     2. Fluid Accumulation:  RLE and R foot non pitting edema, LLE and L foot +2 pitting edema    NUTRITION PRESCRIPTION:   74.6 kg Actual Body Weight  Calories: 6823-2114 calories/day (25-30 kcal/kg)  Protein:  grams protein/day (1.2-1.5 grams protein per kg)  Fluid: ~1 ml/kcal or per MD discretion    NUTRITION DIAGNOSIS/PROBLEM:  Malnutrition related to altered GI function/GI disorder and insufficient appetite resulting in inadequate nutrition intake as evidenced by documented/reported insufficient oral intake and documented/reported unintentional weight loss      MONITOR AND EVALUATE/NUTRITION GOALS:  PO intake of 75% of meals TID - Not met, Continues  PO intake of 75% of oral nutrition supplement/s - Not met, Continues  Weight stable within 1 to 2 lbs during admission - Ongoing      MEDICATIONS:  Acyclovir, bumex, 40 mEq PO KCL, renvela,    Gtt: NS @150 mL/hr    LABS:  Phos: 7.8, BUN:56 Cr:2.32    Pt is at Moderate nutrition risk    Fatoumata Medellin RDN, LDN, [facility]  Clinical Dietitian   [id number]

## 2025-04-24 NOTE — PROGRESS NOTES
Antimicrobial Dose Adjustment for Acyclovir    Route:  PO    Indication:  HSV prophylaxis (immunocompromised) or suppression of recurrent orolabial/genital HSV (PO)  Anthropometrics:   Wt Readings from Last 1 Encounters:   04/21/25 76.9 kg (169 lb 9.6 oz)     Ideal body weight: 77.6 kg (171 lb 1.2 oz)  Body mass index is 23 kg/m².    Dosing Weight: Ideal (use adjusted if BMI >=40 kg/m2).  Use actual if less than ideal.  Fixed dosing (PO)    Renal Function:  Recent Labs   Lab 04/21/25  1656 04/22/25  0317 04/23/25  0550   CREATSERUM 1.02 1.07 1.90*      Estimated Creatinine Clearance: 39.9 mL/min (A) (based on SCr of 1.9 mg/dL (H)).    Renal Replacement:  No      Acyclovir adjusted per P&T protocol.

## 2025-04-24 NOTE — BH PROGRESS NOTE
PHQ-4 follow-up deferred at this time due to patient declining to meet with liaison.  Per patient's RN charting \"Pt refused Psych liaison consult\".  If patient requests to speak to psych liaison at a later time, please contact ext [phone].

## 2025-04-24 NOTE — CONSULTS
[facility]   part of [facility]  Palliative Care Initial Consult Note    Lencho Torres Patient Status:  Inpatient    1955 MRN [id number]   Location [facility] 4NW-A Attending Joycelyn De Leon MD   Hosp Day # 6 PCP Shayan Harris MD     Date of Consult: 2025  Patient seen at: [facility] In[facility]    Reason for Consultation: Consult ordered by:: Dr. 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 Work up in our hospital included PICC placement, MRI brain (no mets), Lumbar puncture,  cycle 1 R-CHOP chemo initiated,  HD for TLS, hyperkalemia, hyperphos.  History was obtained from [facility] and the patient.      Today is day #6 of hospitalization.     When I entered the room, the patient was awake & alert and lying in bed.  No visitors  present at bedside.      Review of Systems:   Symptoms(s): Anorexia, Nausea, Pain    Pain assessment:   24 hour (4164-7099) OME total: 6mg (Morphine 2mg IV)  Current pain: 4/10 decreased from 8/10, described as constant aching and intermittently sharp, located throughout abdomen, mostly upper and RLQ, made worse by eating anything other than water and phosphorous , alleviated by IV morphine  Lencho has been able to ambulate to BR independently without increase in pain  Pain goal: 4/10    Dyspnea: denies  Cough: denies  Nausea: mild, intermittent  Appetite: unable to tolerate anything solid due to increase in pain with PO  Pain: as above  Constipation: has had small BM's  Last BM:   Bowel Movement         2025  0925             Stool Count Calculated for I/O: 1            Palliative Care Social  History:   Marital Status: Single  Children: No  Living Situation Prior to Admit: Home  Does Patient Live Alone: Yes  Is Patient Confused: No  Occupational History:  /consultant    Substance History:   reports that he quit smoking about 40 years ago. His smoking use included cigarettes. He started smoking about 62 years ago. He has a 22 pack-year smoking history. He has never used smokeless tobacco.  reports current alcohol use.  reports no history of drug use.    Spiritual Assessment:   Wishes Privacy    Past Medical History/Past Surgical History:   This is the 2nd hospitalization in the past 6 months.    Medical History: obtained from [facility]  Past Medical History[1]  Past Surgical History[2]    Family History: obtained from [facility]  Family History[3]    Allergies:  Allergies[4]    Medications:   Current Hospital Medications[5]    Functional Status History:  ADLs: bathing or showering, dressing, getting in and out of bed or a chair, walking, using the toilet, and eating  - PTA was incapacitated by pain (prior to dx), now improving in ADLs  IADLs: use the phone, shop for groceries, meal preparation, manage medicines, clean living area, use transportation by self, manage money  - prior to dx had been independent but more recently unable to complete IADLs due to pain.     Palliative Performance Scale:   Prior to admission: (pt/family reported) 90 %  Observed during hospitalization: 60 %  % Ambulation Activity Level Self-Care Intake Consciousness   100 Full  Normal  No Disease Full Normal Full   90 Full  Normal  Some Disease Full Normal Full   80 Full  Normal w/effort  Some Disease Full Normal or reduced Full   70 Reduced  Can't Perform Job  Some Disease Full Normal or reduced Full   60 Reduced  Can't Perform Hobby   Significant Disease Occ Assist Normal or reduced Full or confused   50 Mainly sit/lie Can't do any work  Extensive Disease Partial Assist Normal or reduced Full or confused   40 Mainly in bed  Can't do any work  Extensive Disease Mainly Assist Normal or reduced Full or confused   30 Bed Bound Can't do any work  Extensive Disease Max Assist  Total Care Reduced  Drowsy/confused   20 Bed Bound Can't do any work  Extensive Disease Max Assist  Total Care Minimal  Drowsy/confused   10 Bed Bound Can't do any work  Extensive Disease Max Assist  Total Care Mouth Care  Drowsy/confused   0 Death        Objective      Vital Signs:  Blood pressure (!) 125/96, pulse 105, temperature 97.8 °F (36.6 °C), temperature source Oral, resp. rate 20, weight 169 lb 9.6 oz (76.9 kg), SpO2 98%.  Body mass index is 23 kg/m².    Physical Exam:  General: Alert & Awake. In no apparent respiratory distress. Body habitus BMI WNL   HEENT: AT/NC. No gross focal deficits. Dry MM   Cardiac: tachycardia, RRR  Lungs: Normal effort on RA  Abdomen: Soft, tender, distended, normal bowel sounds X 4 quadrants   Extremities: 2+ LE edema present  Neurologic: Alert and oriented to person, place, time, and situation   Psychiatric: Mood pleasant mood   Skin: Warm and dry.    Hematology:  Lab Results   Component Value Date    WBC 5.1 04/22/2025    HGB 12.5 (L) 04/22/2025    HCT 36.4 (L) 04/22/2025    PLT 84.0 (L) 04/22/2025       Coags:  Lab Results   Component Value Date    INR 1.12 04/19/2025    PTT 22.6 (L) 04/09/2025       Chemistry:  Lab Results   Component Value Date    CREATSERUM 2.32 (H) 04/24/2025    BUN 56 (H) 04/24/2025     04/24/2025    K 3.6 04/24/2025     04/24/2025    CO2 22.0 04/24/2025     (H) 04/24/2025    CA 6.7 (L) 04/24/2025    ALB 3.3 04/22/2025    ALKPHO 176 (H) 04/22/2025    BILT 0.4 04/22/2025    TP 5.1 (L) 04/22/2025     (H) 04/22/2025    ALT 50 (H) 04/22/2025    PSA 11.86 (H) 04/09/2025    MG 2.1 04/24/2025    PHOS 7.8 (H) 04/24/2025       Imaging:  No results found.    Summary of Discussion      I discussed reason for palliative care consultation with Patient    I informed the patient that the focus  of our palliative care consult was to include assistance with arising symptom management needs, an extra layer of support as he pursued treatment. I discussed that ongoing PC symptom management support would continue as OP with Teresita Boudreaux NP and he was agreeable.     Prognostic awareness/understanding: Good.  Specific discussion regarding prognosis deferred in light of his d/w oncology PA earlier today.     Hopes/goals:   Per oncology note earlier today, patient now agreeable to continue with treatment focused POC  Return home to self care  Resume PO diet without pain    Fears/concerns:   Patient has concerns about his pain control and ability to resume PO intake without pain. States \"I would rather starve than have that pain (with eating)\"  Lencho has stated he would not take any PO meds now.   Provided emotional support to Patient. I discussed non-oral meds for pain control and nausea. He acknowledged the IV morphine was effective.   I discussed changing the morphine to dilaudid due to his LISSETT. I encouraged him to use as needed so we could determine his overall opioid requirement toward eventually transitioning to PO or possibly fentanyl patch if non-PO option was indicated.     Advance Care Planning counseling and discussion:   HCPOA:  Document on file Yes [] No []. Requested for file [x] Informed we did not have on file.   Healthcare Agent Appointed: Yes  Healthcare Agent's Name: Susan Zuleta (friend)  Healthcare Agent's Phone Number: [phone]    Code Status:  Full Code confirmed per oncology earlier today    Problem List:  Active Problems:    LISSETT (acute kidney injury)    Diffuse large B cell lymphoma (HCC)    Admission for chemotherapy    At high risk of tumor lysis syndrome    Renal insufficiency    Hyperuricemia    Spontaneous tumor lysis syndrome (HCC)    Cancer related pain    Tumor lysis syndrome (HCC)    Hyperkalemia    Hyperphosphatemia    Hypocalcemia    Elevated PSA    Depression    Palliative  care encounter      Assessment and Recommendations      Goals of care: established and treatment focused   Per oncology note earlier today, patient now agreeable to continue with treatment focused POC  Return home to self care  Resume PO diet without pain  Overall improved pain control    Advanced Care Planning:  Code Status: Full Code confirmed per oncology earlier today  HCPOA: Patient informed document not on file. Requested. Healthcare Agent's Name: Susan Zuleta  Symptoms  Pain (abdominal)  STOP Morphine IV due to LISSETT  STOP Norco and Oxycodone: unable to tolerate PO and were ineffective as OP  START Dilaudid 0.2-0.4 mg q2hrs prn   Will continue to titrate/dose find with transition to PO or possible fentanyl patch depending on clinical course and analgesic needs.   Nausea  Continue antiemetics PRN as ordered  Constipation (at risk for)  Monitor, continue with prn bowel regimen for now    Discussed today's visit with  RN and hospitalist.    Palliative Care Follow Up: Palliative care team will Continue to follow while inpatient.  Palliative care follow up at discharge: Will establish with [facility] Palliative NP Teresita Boudreaux for ongoing assistance with symptom management and [facility] support.    Thank you for allowing Palliative Care services to participate in the care of Lencho Torres.    A total of 55 minutes were spent on this consult, which included all of the following: chart review, direct face to face contact, history taking, physical examination, counseling and coordinating care, and documentation    JULIEN Todd  4/24/2025  4:33 PM  Palliative Care Services    The 21st Century Cures Act makes medical notes like these available to patients in the interest of transparency. Please be advised this is a medical document. Medical documents are intended to carry relevant information, facts as evident, and the clinical opinion of the practitioner. The medical note is intended as peer to peer communication and may appear  blunt or direct. It is written in medical language and may contain abbreviations or verbiage that are unfamiliar.          [1]   Past Medical History:   Hx of lymphoma, non-Hodgkins    CUtaneous, b cell (venogopal)    Unspecified essential hypertension   [2]   Past Surgical History:  Procedure Laterality Date    Other surgical history  '12    [place] tooth    Other surgical history Right     lasik   [3]   Family History  Problem Relation Age of Onset    Hypertension Brother         half brother    Lipids Brother     Stroke Mother    [4]   Allergies  Allergen Reactions    Ibuprofen WHEEZING   [5]   Current Facility-Administered Medications:     potassium chloride (Klor-Con M20) tab 40 mEq, 40 mEq, Oral, Once    morphINE PF 2 MG/ML injection 1 mg, 1 mg, Intravenous, Q2H PRN **OR** morphINE PF 2 MG/ML injection 2 mg, 2 mg, Intravenous, Q2H PRN **OR** morphINE PF 4 MG/ML injection 4 mg, 4 mg, Intravenous, Q2H PRN    acyclovir (Zovirax) cap 200 mg, 200 mg, Oral, BID    sodium chloride 0.9% infusion, , Intravenous, Continuous    sevelamer carbonate (Renvela) tab 800 mg, 800 mg, Oral, TID CC    allopurinol (Zyloprim) tab 300 mg, 300 mg, Oral, BID    prochlorperazine (Compazine) 10 MG/2ML injection 10 mg, 10 mg, Intravenous, Q6H PRN    melatonin tab 3 mg, 3 mg, Oral, Nightly PRN    ondansetron (Zofran) 4 MG/2ML injection 4 mg, 4 mg, Intravenous, Q6H PRN    [Held by provider] enoxaparin (Lovenox) 40 MG/0.4ML SUBQ injection 40 mg, 40 mg, Subcutaneous, Daily    polyethylene glycol (PEG 3350) (Miralax) 17 g oral packet 17 g, 17 g, Oral, Daily PRN    sennosides (Senokot) tab 17.2 mg, 17.2 mg, Oral, Nightly PRN    bisacodyl (Dulcolax) 10 MG rectal suppository 10 mg, 10 mg, Rectal, Daily PRN    fleet enema (Fleet) rectal enema 133 mL, 1 enema, Rectal, Once PRN    HYDROcodone-acetaminophen (Norco) 5-325 MG per tab 1 tablet, 1 tablet, Oral, Q6H PRN    oxyCODONE immediate release tab 5 mg, 5 mg, Oral, Q4H PRN

## 2025-04-24 NOTE — PLAN OF CARE
Pt is a/o x4, RA. Tele. Pt woke up this morning emotional and crying. Pt stated he had a dream with his  dog and they are walking towards a long road. And he sees in his dream this beautiful universe. He stated he wants to stop treatment. He stated he's not a quitter but he feels he'd done enough. Support offered to pt. Psych liaison consulted, palliative consulted. Pt still refusing PO meds. Refused breakfast and lunch but requested italian ice later in the afternoon. Pt voiced out that he only wants medications through IV. Hospitalist made aware. Abd and shoulder pain, morphine PRN given. Bumex IV once given. Pt voiding well. IVF infusing as ordered. Modified independence in the room. Family friend visited pt today. Pt was glad with the visit. Call light within pt's reach.     Problem: GASTROINTESTINAL - ADULT  Goal: Maintains adequate nutritional intake (undernourished)  Description: INTERVENTIONS:- Monitor percentage of each meal consumed- Identify factors contributing to decreased intake, treat as appropriate- Assist with meals as needed- Monitor I&O, WT and lab values- Obtain nutritional consult as needed- Optimize oral hygiene and moisture- Encourage food from home; allow for food preferences- Enhance eating environment  Outcome: Progressing     Problem: METABOLIC/FLUID AND ELECTROLYTES - ADULT  Goal: Electrolytes maintained within normal limits  Description: INTERVENTIONS:- Monitor labs and rhythm and assess patient for signs and symptoms of electrolyte imbalances- Administer electrolyte replacement as ordered- Monitor response to electrolyte replacements, including rhythm and repeat lab results as appropriate- Fluid restriction as ordered- Instruct patient on fluid and nutrition restrictions as appropriate  Outcome: Progressing  Goal: Hemodynamic stability and optimal renal function maintained  Description: INTERVENTIONS:- Monitor labs and assess for signs and symptoms of volume excess or deficit-  Monitor intake, output and patient weight- Monitor urine specific gravity, serum osmolarity and serum sodium as indicated or ordered- Monitor response to interventions for patient's volume status, including labs, urine output, blood pressure (other measures as available)- Encourage oral intake as appropriate- Instruct patient on fluid and nutrition restrictions as appropriate  Outcome: Progressing     Problem: SKIN/TISSUE INTEGRITY - ADULT  Goal: Incision(s), wounds(s) or drain site(s) healing without S/S of infection  Description: INTERVENTIONS:- Assess and document risk factors for pressure ulcer development- Assess and document skin integrity- Assess and document dressing/incision, wound bed, drain sites and surrounding tissue- Implement wound care per orders- Initiate isolation precautions as appropriate- Initiate Pressure Ulcer prevention bundle as indicated  Outcome: Progressing     Problem: HEMATOLOGIC - ADULT  Goal: Maintains hematologic stability  Description: INTERVENTIONS- Assess for signs and symptoms of bleeding or hemorrhage- Monitor labs and vital signs for trends- Administer supportive blood products/factors, fluids and medications as ordered and appropriate- Administer supportive blood products/factors as ordered and appropriate  Outcome: Progressing     Problem: PAIN - ADULT  Goal: Verbalizes/displays adequate comfort level or patient's stated pain goal  Description: INTERVENTIONS:- Encourage pt to monitor pain and request assistance- Assess pain using appropriate pain scale- Administer analgesics based on type and severity of pain and evaluate response- Implement non-pharmacological measures as appropriate and evaluate response- Consider cultural and social influences on pain and pain management- Manage/alleviate anxiety- Utilize distraction and/or relaxation techniques- Monitor for opioid side effects- Notify MD/LIP if interventions unsuccessful or patient reports new pain- Anticipate increased  pain with activity and pre-medicate as appropriate  Outcome: Progressing

## 2025-04-24 NOTE — PROGRESS NOTES
Pt is alert and oriented x4. Pt has ivf running and no complaints of pain. Pt has call light in reach and up with assistance.

## 2025-04-24 NOTE — PROGRESS NOTES
Hem/Onc Inpatient Note    Patient Name: Lencho Torres   YOB: 1955   Medical Record Number: [id number]   CSN: [id number]   Attending Hematology/Oncology Physician: Dr. Charlene Taylor       The 21st Century Cures Act makes medical notes like these available to patients in the interest of transparency. Please be advised this is a medical document. Medical documents are intended to carry relevant information, facts as evident, and the clinical opinion of the practitioner. The medical note is intended as peer to peer communication and may appear blunt or direct. It is written in medical language and may contain abbreviations or verbiage that are unfamiliar.     S:   Pt seen in bed, emotional. He initially states he \"is done with all treatment.\" He had a dream last night that he was \"going into the light\" accompanied by his dog who passed away and he felt very happy in the dream. States he \"was ready to go,\" but was then upset when he woke up still at [facility].   He reports feeling tired of being here, however at the same time does not want to go home as he lives alone and \"has nobody.\" He does express he is close with his neighbor and her daughter, also has a friend who is his POA, however \"they have their own lives to live.\"     He denies having any suicidal ideations. States he does not wish to harm himself.    After further clarifying questions and empathetic discussion, patient wishes to be full code & wants to continue current treatments but does not want any oral medications. He is OK with IV medications.    States he has constant pain with fluctuating intensity, currently 7/10.   No shortness of breath or chest pain.  Feels like his legs are much more swollen.   He is urinating frequently         Allergies:  Allergies[1]    Scheduled Medications:  Scheduled Medications[2]    PRN Medications:  PRN Medications[3]      Review of Systems: A comprehensive 10 point review of systems was completed.   Pertinent positives and negatives noted in the the HPI.    Physical Examination:   General: Patient is alert and oriented x 3, not in acute distress.  Vital Signs: BP (!) 128/91 (BP Location: Left arm)   Pulse 101   Temp 97.9 °F (36.6 °C) (Oral)   Resp 16   Wt 76.9 kg (169 lb 9.6 oz)   SpO2 98%   BMI 23.00 kg/m²   Chest: Clear to auscultation.  Heart: Regular rate and rhythm.   Abdomen: distended  Extremities: swelling of bilateral feet/ankles  Neurological: Grossly intact.   Psych/Depression: depressed, pleasant    Labs:  Recent Results (from the past 24 hours)   Basic Metabolic Panel (8)    Collection Time: 04/24/25  5:50 AM   Result Value Ref Range    Glucose 109 (H) 70 - 99 mg/dL    Sodium 140 136 - 145 mmol/L    Potassium 3.6 3.5 - 5.1 mmol/L    Chloride 106 98 - 112 mmol/L    CO2 22.0 21.0 - 32.0 mmol/L    Anion Gap 12 0 - 18 mmol/L    BUN 56 (H) 9 - 23 mg/dL    Creatinine 2.32 (H) 0.70 - 1.30 mg/dL    Calcium, Total 6.7 (L) 8.7 - 10.6 mg/dL    Calculated Osmolality 306 (H) 275 - 295 mOsm/kg    eGFR-Cr 30 (L) >=60 mL/min/1.73m2   Magnesium    Collection Time: 04/24/25  5:50 AM   Result Value Ref Range    Magnesium 2.1 1.6 - 2.6 mg/dL   Phosphorus    Collection Time: 04/24/25  5:50 AM   Result Value Ref Range    Phosphorus 7.8 (H) 2.4 - 5.1 mg/dL       Radiology:    IR CENTRAL VENOUS ACCESS  Result Date: 4/21/2025  CONCLUSION: Successful placement of a temporary dialysis catheter via right internal jugular vein. Catheter is ready for use.  Recommend routine catheter care.   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  - PET/CT scan suggested possible left posterior fossa CNS disease,  therefore MRI brain performed which does not appear to be as suspicious for this.    - S/p LP 4/19 prior to starting chemo- CSF analysis with no cytologic evidence of malignancy  - S/p cycle 1 R-CHOP chemotherapy 4/19  - Continue acyclovir 400 mg BID      *Tumor lysis syndrome, hyperkalemia, hyperphosphatemia, hypocalcemia  - Presented with markedly elevated uric acid with renal insufficiency even prior to starting chemotherapy.   - S/p rasburicase 3 mg 4/18 and 6 mg 4/19 and uric acid now normal.   - Hyperphosphatemia improving: continue Renvela  - Hypocalcemia, slightly improving  - Potassium normal today  - Monitor electrolytes closely.  - Nephro on board, appreciate aid in management     *LISSETT  - Worsening today with stable urine output  - Likely related to tumor lysis syndrome  - S/p HD 4/21/25  - Nephrology on board, appreciate aid in management  - Continue aggressive hydration    *Leg swelling  - due to aggressive hydration  - worse today  - Consider additional dose of Lasix today if OK with nephro     *Cancer related pain  - Extensive malignant peritoneal involvement causing pain. Fluctuates in severity  - Pt currently declining further PO Medications      *Depression  - related to cancer diagnosis, hospitalization  - Support offered  - RN to repeat depression screen  - Therapy dog visits, musician visit if possible  - As outpatient, set up with Gabby Green for talk therapy    *Resuscitation status  - After dedicated goals of care discussion, patient wishes to be full code. Chart updated         Case discussed with Dr. Taylor, who is in agreement with plan as outlined above.    Is this a shared or split note between Advanced Practice Provider and Physician? No      Please do not hesitate to reach out with any additional questions or concerns.    Electronically Signed by:    Joana VILLEGAS PA-C Physician Assistant  Hematology and Oncology                  [1]   Allergies  Allergen Reactions     Ibuprofen WHEEZING   [2]    potassium chloride  40 mEq Oral Once    acyclovir  200 mg Oral BID    sevelamer carbonate  800 mg Oral TID CC    allopurinol  300 mg Oral BID    [Held by provider] enoxaparin  40 mg Subcutaneous Daily   [3]   prochlorperazine    melatonin    ondansetron    polyethylene glycol (PEG 3350)    sennosides    bisacodyl    fleet enema    HYDROcodone-acetaminophen    oxyCODONE

## 2025-04-24 NOTE — PROGRESS NOTES
[facility]   part of [facility]     Hospitalist Progress Note     Lencho Torres Patient Status:  Inpatient    1955 MRN [id number]   Location [facility] 4NW-A Attending Paul Yao, DO   Hosp Day # 6 PCP Shayan Harris MD     Chief Complaint: Direct admission for chemotherapy by oncologist    Subjective:     Feeling better today, abdominal distention and pain are improving. Having some increased LE swelling patient very emotional and tearful today, has malignancy related pain.    Objective:    Review of Systems:   A comprehensive review of systems was completed; pertinent positive and negatives stated in subjective.    Vital signs:  Temp:  [96.6 °F (35.9 °C)-98.2 °F (36.8 °C)] 97.8 °F (36.6 °C)  Pulse:  [100-105] 105  Resp:  [16-20] 20  BP: (125-141)/(91-99) 125/96  SpO2:  [95 %-98 %] 98 %    Physical Exam:    BP (!) 134/94 (BP Location: Left arm)   Pulse 102   Temp 98 °F (36.7 °C) (Oral)   Resp 18   Wt 169 lb 9.6 oz (76.9 kg)   SpO2 96%   BMI 23.00 kg/m²     General: No acute distress  Respiratory: No wheezes, no rhonchi  Cardiovascular: S1, S2, regular rate and rhythm  Abdomen: Soft, Non-tender, +distended, positive bowel sounds  Neuro: No new focal deficits.   Extremities: trace pitting BLLE edema    Diagnostic Data:    Labs:  Recent Labs   Lab 25  1653 25  0549 25  0511 25  0453 25  1119   WBC 10.2 11.1* 11.3* 9.0 5.1   HGB 15.4 14.8 14.0 12.6* 12.5*   MCV 91.2 89.7 92.4 92.0 89.4   .0 218.0 174.0 137.0* 84.0*   BAND  --   --  5  --   --    INR  --  1.12  --   --   --        Recent Labs   Lab 25  0511 25  0633 25  0453 25  1656 25  0317 25  0550 25  0550   *   < > 224*   < > 129* 128* 109*   BUN 41*   < > 77*   < > 42* 58* 56*   CREATSERUM 1.29   < > 1.42*   < > 1.07 1.90* 2.32*   CA 9.7   < > 6.9*   < > 7.5* 6.6* 6.7*   ALB 3.7  --  3.4  --  3.3  --   --    *   < > 140   < > 141 139 140   K 6.9*    < > 5.8*   < > 3.8 3.9 3.6   CL 99   < > 102   < > 104 104 106   CO2 21.0   < > 20.0*   < > 26.0 24.0 22.0   ALKPHO 106  --  209*  --  176*  --   --    AST 67*  --  377*  --  167*  --   --    ALT 27  --  63*  --  50*  --   --    BILT 0.4  --  0.3  --  0.4  --   --    TP 5.8  --  5.3*  --  5.1*  --   --     < > = values in this interval not displayed.       Estimated Glomerular Filtration Rate: 30 mL/min/1.73m2 (A) (result from lab).    No results for input(s): \"TROP\", \"TROPHS\", \"CK\" in the last 168 hours.    Recent Labs   Lab 04/19/25  0549   PTP 14.5   INR 1.12        Microbiology    No results found for this visit on 04/18/25.      Imaging: Reviewed in Epic.    Medications: Scheduled Medications[1]    Assessment & Plan:      #Diffuse large B cell lymphoma  -acyclovir, allopurinol, steroids  -MRI brain shows no metastatic disease, high FLAIR signal abnormality in left frontal lobe  -S/p LP on 4/19 prior to starting chemo  -CSF final results pending  -Started cycle 1 R-CHOP chemotherapy on 4/19  -s/p rituximab  -oncology following    #Tumor lysis syndrome  #Hyperkalemia  #Hyperphosphatemia  # Hyperuricemia  -S/p rasburicase on 4/18 and 4/19  -IV fluids  -Allopurinol  -Calcium gluconate, insulin, D50, Lokelma  -temporary catheter placed for HD 4/21 > s/p HD 4/21  -Nephrology following    #LISSETT, d/t tumor lysis syndrome/hypercalcemia   -follow BMP  -IV fluids  -HD as above  -nephrology following     #BLLE edema  -PRN laisx     #Hypercalcemia d/t TLS  -IVF, HD, management as above   -Nephrology following    #Cancer-related pain  -Oxycodone as needed    #HTN  -controlled  -not on medications    # Cancer related pain  - Pain meds as needed    # Situational anxiety, depression  - Patient refused any new anxiety or depression medications at this time  - Discussed regarding psychotherapy, patient states he does not believe this will help either, will have psych liaison consult to at least give patient resources and initiate  psychotherapy    Discussed with patient, RN    Joycelyn De Leon MD        Supplementary Documentation:     Quality:  DVT Mechanical Prophylaxis:   SCDs,    DVT Pharmacologic Prophylaxis   Medication    [Held by provider] enoxaparin (Lovenox) 40 MG/0.4ML SUBQ injection 40 mg                Code Status: Full Code  Crowley: No urinary catheter in place  Crowley Duration (in days):   Central line:    KAY:     Discharge is dependent on: Clinical improvement  At this point Mr. Torres is expected to be discharge to: Home    The 21st Century Cures Act makes medical notes like these available to patients in the interest of transparency. Please be advised this is a medical document. Medical documents are intended to carry relevant information, facts as evident, and the clinical opinion of the practitioner. The medical note is intended as peer to peer communication and may appear blunt or direct. It is written in medical language and may contain abbreviations or verbiage that are unfamiliar.     Dietitian Malnutrition Assessment    Evaluation for Malnutrition:                       RD Malnutrition Care Plan:      Body Fat/Muscle Mass:          Physician Assessment     Patient has a diagnosis of moderate malnutrition        **Certification      PHYSICIAN Certification of Need for Inpatient Hospitalization - Initial Certification    Patient will require inpatient services that will reasonably be expected to span two midnight's based on the clinical documentation in H+P.   Based on patients current state of illness, I anticipate that, after discharge, patient will require TBD.           [1]    potassium chloride  40 mEq Oral Once    acyclovir  200 mg Oral BID    sevelamer carbonate  800 mg Oral TID CC    allopurinol  300 mg Oral BID    [Held by provider] enoxaparin  40 mg Subcutaneous Daily

## 2025-04-24 NOTE — PROGRESS NOTES
[facility]   part of [facility]     Nephrology Progress Note    Lencho Torres Patient Status:  Inpatient    1955 MRN [id number]   Location [facility] 4NW-A Attending Joycelyn De Leon MD   Hosp Day # 6 PCP Shayan Harris MD       SUBJECTIVE:  Stable overnight, frustrated        Physical Exam:   BP (!) 140/97 (BP Location: Left arm)   Pulse 101   Temp 98 °F (36.7 °C) (Oral)   Resp 18   Wt 169 lb 9.6 oz (76.9 kg)   SpO2 96%   BMI 23.00 kg/m²   Temp (24hrs), Av.7 °F (36.5 °C), Min:96.6 °F (35.9 °C), Max:98.3 °F (36.8 °C)       Intake/Output Summary (Last 24 hours) at 2025 0723  Last data filed at 2025 0500  Gross per 24 hour   Intake 3591 ml   Output 1580 ml   Net 2011 ml     Last 3 Weights   25 0440 169 lb 9.6 oz (76.9 kg)   25 0500 168 lb 3.2 oz (76.3 kg)   25 0700 164 lb 7.4 oz (74.6 kg)   25 1341 147 lb 3.2 oz (66.8 kg)   25 1000 166 lb 3.6 oz (75.4 kg)   25 1314 166 lb 3.2 oz (75.4 kg)   25 1915 178 lb 9.6 oz (81 kg)   25 1731 176 lb (79.8 kg)   25 1303 176 lb (79.8 kg)   25 1109 176 lb 6.4 oz (80 kg)   22 1013 174 lb 3.2 oz (79 kg)     General: Alert and oriented in no apparent distress.  HEENT: No scleral icterus, MMM  Neck: Supple, no DULCE or thyromegaly  Cardiac: Regular rate and rhythm, S1, S2 normal, no murmur or rub  Lungs: Clear without wheezes, rales, rhonchi.    Abdomen: Soft, non-tender. + bowel sounds, no palpable organomegaly  Extremities: + edema.  Neurologic: moving all extremities  Skin: Warm and dry, no rash        Labs:     Recent Labs   Lab 25  1653 25  0549 25  0511 25  0453 25  1119   WBC 10.2 11.1* 11.3* 9.0 5.1   HGB 15.4 14.8 14.0 12.6* 12.5*   MCV 91.2 89.7 92.4 92.0 89.4   .0 218.0 174.0 137.0* 84.0*   BAND  --   --  5  --   --    INR  --  1.12  --   --   --        Recent Labs   Lab 25  2253 25  0549 25  1211 25  0511  04/20/25  0633 04/20/25  1401 04/20/25  2129 04/21/25  0453 04/21/25  1656 04/22/25  0317 04/23/25  0550 04/24/25  0550   * 134*   < > 135*  --  142   < > 140 142 141 139 140   K 5.3* 4.8   < > 6.9* 6.9* 4.7   < > 5.8* 4.1 3.8 3.9 3.6   CL 90* 92*   < > 99  --  106   < > 102 103 104 104 106   CO2 23.0 25.0   < > 21.0  --  19.0*   < > 20.0* 23.0 26.0 24.0 22.0   BUN 48* 44*   < > 41*  --  58*   < > 77* 66* 42* 58* 56*   CREATSERUM 1.54* 1.47*   < > 1.29  --  1.02   < > 1.42* 1.02 1.07 1.90* 2.32*   CA 11.0* 11.2*   < > 9.7  --  7.6*   < > 6.9* 7.4* 7.5* 6.6* 6.7*   MG 2.4 2.4  --  2.4  --   --   --   --   --   --  2.4 2.1   PHOS 4.3 3.4   < > 10.6* 11.3* 10.4*  --  17.4*  --  8.6* 9.3*  --    GLU 87 93   < > 152*  --  149*   < > 224* 152* 129* 128* 109*    < > = values in this interval not displayed.       Recent Labs   Lab 04/18/25  2253 04/19/25  0549 04/19/25  1211 04/20/25  0511 04/21/25  0453 04/22/25  0317   ALT 27 27 28 27 63* 50*   AST 58* 71* 77* 67* 377* 167*   ALB 3.9 3.9 3.8 3.7 3.4 3.3   LDH 2,514* 3,279* 3,556* 3,553* >4,500*  --        Recent Labs   Lab 04/18/25  1807 04/20/25  0707 04/20/25  0907 04/20/25  1008 04/20/25  1120   PGLU 89 159* 140* 173* 197*       Meds:   Current Hospital Medications[1]      Impression/Plan:         1.Hyperkalemia: Due to severe TLS. Was admitted for chemo initiation in setting of newly diagnosed aggressive DLBCL with high tumor burden. Resolved with HD x 1 and has been stable.  Cont to monitor     2.Hyperphos: 2/2 above. S/p HD + on phos binder (renvela); continue to monitor     3. DLBCL: admitted for R-CHOP, received vincristine, cytoxan and doxorubicin 4/19. S/p Rituximab - per heme.     4. Hyperuricemia: 2/2 TLS. S/p rasburicase x2. Improved.      5. Acute kidney injury: baseline serum creatinine ~0.8-0.9 mg/dL. Worsening serum creatinine 2/2 likely TLS.. creat up today but UOP has been stable   - Continue aggressive hydration and monitor       Questions/concerns were discussed with patient and/or family by bedside.          Nico Whitman MD  4/24/2025  7:23 AM         [1]   Current Facility-Administered Medications   Medication Dose Route Frequency    acyclovir (Zovirax) cap 200 mg  200 mg Oral BID    sodium chloride 0.9% infusion   Intravenous Continuous    sevelamer carbonate (Renvela) tab 800 mg  800 mg Oral TID CC    allopurinol (Zyloprim) tab 300 mg  300 mg Oral BID    prochlorperazine (Compazine) 10 MG/2ML injection 10 mg  10 mg Intravenous Q6H PRN    melatonin tab 3 mg  3 mg Oral Nightly PRN    ondansetron (Zofran) 4 MG/2ML injection 4 mg  4 mg Intravenous Q6H PRN    [Held by provider] enoxaparin (Lovenox) 40 MG/0.4ML SUBQ injection 40 mg  40 mg Subcutaneous Daily    polyethylene glycol (PEG 3350) (Miralax) 17 g oral packet 17 g  17 g Oral Daily PRN    sennosides (Senokot) tab 17.2 mg  17.2 mg Oral Nightly PRN    bisacodyl (Dulcolax) 10 MG rectal suppository 10 mg  10 mg Rectal Daily PRN    fleet enema (Fleet) rectal enema 133 mL  1 enema Rectal Once PRN    HYDROcodone-acetaminophen (Norco) 5-325 MG per tab 1 tablet  1 tablet Oral Q6H PRN    oxyCODONE immediate release tab 5 mg  5 mg Oral Q4H PRN    dextrose 5%-sodium chloride 0.9% infusion   Intravenous Continuous

## 2025-04-24 NOTE — PROGRESS NOTES
Depression/anxiety/suicide screening done. Pt refused Psych liaison consult. Pt is emotional but pleasant. He denies hurting himself. Support offered. Animal assisted therapy updated in the navigator. Music therapy not available today, only Fridays. Support offered.

## 2025-04-25 PROBLEM — R11.0 NAUSEA: Status: ACTIVE | Noted: 2025-01-01

## 2025-04-25 PROBLEM — R11.0 NAUSEA: Status: ACTIVE | Noted: 2025-04-25

## 2025-04-25 NOTE — PROGRESS NOTES
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 Work up in our hospital included PICC placement, MRI brain (no mets), Lumbar puncture,  cycle 1 R-CHOP chemo initiated,  HD for TLS, hyperkalemia, hyperphos.      Consult ordered by:: Dr. De Leon for evaluation of Palliative Care needs and Uncontrolled symptoms;Advance care planning / HPOA;Establish palliative care;Goals of care discussion.    Allergies:  Allergies[1]  Medications:   Current Hospital Medications[2]    Subjective      Interval events: None.    When I entered the room, the patient was awake, alert, and sitting at EOB. No family present at bedside.     Symptom assessment: still with poor appetite, but willing to eat something today. No BM. Feels abdomen is distended, but improved since admission. Still with LE edema. Pain present but 2/10 and functional   Pain assessment:   24 hour (3696-8331) OME total: IV morphine 2mg x1 and IV dilaudid 0.2mg x1  States that the dilaudid was really strong for him and wants a lower dose.   Current pain: 2/10, described as deep aching pain in the upper and mid abdomen bilaterally. He does have some lower abdominal pain but denies dysuria. Pain made worse by some movements and palpitation, alleviated by rest/time and pain meds.     See summary of discussion below.     Review of Systems:  Pertinent items are noted in subjective.  Bowel Movement         2025  0925             Stool Count Calculated for I/O: 1            Objective:     Vital Signs:  Blood pressure (!) 149/99, pulse 109, temperature 98.1 °F  (36.7 °C), temperature source Oral, resp. rate 20, weight 169 lb 9.6 oz (76.9 kg), SpO2 98%.  Body mass index is 23 kg/m².  Current Palliative performance scale PPSv2 (%): 60    Physical Exam:  General: Alert & Awake. In no apparent respiratory distress. Body habitus BMI WNL   HEENT: No gross focal deficits. MMM   Lungs: Normal effort on RA  Abdomen: Soft, mildly distended. TTP in bilateral upper and mid abdomen. No rebound TTP. normal bowel sounds in upper abdomen but hypoactive BS in lower abdomen.   Extremities: LE edema present L>R  Neurologic: Alert and oriented to person, place, time, and situation   Psychiatric: Mood pleasant mood   Skin: Warm and dry.        Results:     Hematology:  Lab Results   Component Value Date    WBC 5.1 04/22/2025    HGB 12.5 (L) 04/22/2025    HCT 36.4 (L) 04/22/2025    PLT 84.0 (L) 04/22/2025     Coags:  Lab Results   Component Value Date    INR 1.12 04/19/2025    PTT 22.6 (L) 04/09/2025     Chemistry:  Lab Results   Component Value Date    CREATSERUM 2.12 (H) 04/25/2025    BUN 46 (H) 04/25/2025     04/25/2025    K 3.4 (L) 04/25/2025    K 3.4 (L) 04/25/2025     04/25/2025    CO2 20.0 (L) 04/25/2025    GLU 93 04/25/2025    CA 6.1 (L) 04/25/2025    ALB 2.8 (L) 04/25/2025    ALKPHO 138 (H) 04/25/2025    BILT 1.0 04/25/2025    TP 4.5 (L) 04/25/2025    AST 42 (H) 04/25/2025    ALT 37 04/25/2025    PSA 11.86 (H) 04/09/2025    MG 2.1 04/24/2025    PHOS 5.2 (H) 04/25/2025     Imaging:  No results found.       Summary of Discussion     Pt feels that he is well cared for by the nursing staff and is grateful to them.   Focused on pain/symptom management.   Pt reluctant to use oral meds 2/2 nausea.     Advance Care Planning documentation: none on file     Assessment and Recommendations       Active Problems:    LISSETT (acute kidney injury)    Diffuse large B cell lymphoma (HCC)    Admission for chemotherapy    At high risk of tumor lysis syndrome    Renal insufficiency     Hyperuricemia    Spontaneous tumor lysis syndrome (HCC)    Cancer related pain    Tumor lysis syndrome (HCC)    Hyperkalemia    Hyperphosphatemia    Hypocalcemia    Elevated PSA    Depression    Palliative care encounter    Situational mixed anxiety and depressive disorder    Goals of care: established and treatment focused   Code Status: Full Code  Healthcare Agent's Name: Susan Zuleta - POA document requested     Symptoms  #abdominal pain  -IV dilaudid panel decreased   -added IV tylenol PRN (pt knows he must ask for medication)     #nausea  -IV antiemetics prn     #abdominal distension   #gas/bloating  #OIC ppx  -pt does not want oral meds at this time  -recommend simethicone chewable tabs prn.     -if pt open to senna, recommend liquid senna BID     #Edema   -Bumex per nephrology   -offered thigh high stockings, but pt declined at this time.     Discussed today's visit with RN.    Palliative Care Follow Up: Palliative care team will Continue to follow while inpatient.  Palliative care follow up outpatient: indicated. Follow up to see Teresita Boudreaux placed in AVS.   The Palliative Care Service does not round on the weekends but providers are available by plista, if needed.     Thank you for allowing Palliative Care services to participate in the care of Lencho Torres.    A total of  40  minutes were spent on this encounter, which included all of the following: chart review, direct face to face contact, history taking, physical examination, counseling and coordinating care, and documentation.     Berkley Null MD  4/25/2025  12:32 PM  Palliative Care Services    The 21st Century Cures Act makes medical notes like these available to patients in the interest of transparency. Please be advised this is a medical document. Medical documents are intended to carry relevant information, facts as evident, and the clinical opinion of the practitioner. The medical note is intended as peer to peer communication and may  appear blunt or direct. It is written in medical language and may contain abbreviations or verbiage that are unfamiliar.          [1]   Allergies  Allergen Reactions    Ibuprofen WHEEZING   [2]   Current Facility-Administered Medications:     bumetanide (Bumex) 0.25 MG/ML injection 2 mg, 2 mg, Intravenous, Once    potassium chloride (Klor-Con M20) tab 40 mEq, 40 mEq, Oral, Once    HYDROmorphone (Dilaudid) 1 MG/ML injection 0.2 mg, 0.2 mg, Intravenous, Q2H PRN **OR** HYDROmorphone (Dilaudid) 1 MG/ML injection 0.4 mg, 0.4 mg, Intravenous, Q2H PRN    acyclovir (Zovirax) cap 200 mg, 200 mg, Oral, BID    allopurinol (Zyloprim) tab 300 mg, 300 mg, Oral, BID    prochlorperazine (Compazine) 10 MG/2ML injection 10 mg, 10 mg, Intravenous, Q6H PRN    melatonin tab 3 mg, 3 mg, Oral, Nightly PRN    ondansetron (Zofran) 4 MG/2ML injection 4 mg, 4 mg, Intravenous, Q6H PRN    [Held by provider] enoxaparin (Lovenox) 40 MG/0.4ML SUBQ injection 40 mg, 40 mg, Subcutaneous, Daily    polyethylene glycol (PEG 3350) (Miralax) 17 g oral packet 17 g, 17 g, Oral, Daily PRN    sennosides (Senokot) tab 17.2 mg, 17.2 mg, Oral, Nightly PRN    bisacodyl (Dulcolax) 10 MG rectal suppository 10 mg, 10 mg, Rectal, Daily PRN    fleet enema (Fleet) rectal enema 133 mL, 1 enema, Rectal, Once PRN

## 2025-04-25 NOTE — PROGRESS NOTES
[facility]   part of [facility]     Hospitalist Progress Note     Lencho Torres Patient Status:  Inpatient    1955 MRN [id number]   Location [facility] 4NW-A Attending Paul Yao, DO   Hosp Day # 7 PCP Shayan Harris MD     Chief Complaint: Direct admission for chemotherapy by oncologist    Subjective:     Feeling better today, abdominal distention and pain are improving. LE swelling improving but still present    Objective:    Review of Systems:   A comprehensive review of systems was completed; pertinent positive and negatives stated in subjective.    Vital signs:  Temp:  [97.9 °F (36.6 °C)-98.1 °F (36.7 °C)] 98.1 °F (36.7 °C)  Pulse:  [] 109  Resp:  [18-20] 20  BP: (128-149)/(86-99) 149/99  SpO2:  [95 %-99 %] 98 %    Physical Exam:    BP (!) 149/99 (BP Location: Left arm)   Pulse 109   Temp 98.1 °F (36.7 °C) (Oral)   Resp 20   Wt 169 lb 9.6 oz (76.9 kg)   SpO2 98%   BMI 23.00 kg/m²     General: No acute distress  Respiratory: No wheezes, no rhonchi  Cardiovascular: S1, S2, regular rate and rhythm  Abdomen: Soft, Non-tender, +distended, positive bowel sounds  Neuro: No new focal deficits.   Extremities: trace pitting BLLE edema    Diagnostic Data:    Labs:  Recent Labs   Lab 25  1653 25  0549 25  0511 25  0453 25  1119   WBC 10.2 11.1* 11.3* 9.0 5.1   HGB 15.4 14.8 14.0 12.6* 12.5*   MCV 91.2 89.7 92.4 92.0 89.4   .0 218.0 174.0 137.0* 84.0*   BAND  --   --  5  --   --    INR  --  1.12  --   --   --        Recent Labs   Lab 25  0453 25  1656 25  0317 25  0550 25  0550 25  0500   *   < > 129* 128* 109* 93   BUN 77*   < > 42* 58* 56* 46*   CREATSERUM 1.42*   < > 1.07 1.90* 2.32* 2.12*   CA 6.9*   < > 7.5* 6.6* 6.7* 6.1*   ALB 3.4  --  3.3  --   --  2.8*      < > 141 139 140 145   K 5.8*   < > 3.8 3.9 3.6 3.4*  3.4*      < > 104 104 106 112   CO2 20.0*   < > 26.0 24.0 22.0 20.0*   ALKPHO 209*   --  176*  --   --  138*   *  --  167*  --   --  42*   ALT 63*  --  50*  --   --  37   BILT 0.3  --  0.4  --   --  1.0   TP 5.3*  --  5.1*  --   --  4.5*    < > = values in this interval not displayed.       Estimated Glomerular Filtration Rate: 33 mL/min/1.73m2 (A) (result from lab).    No results for input(s): \"TROP\", \"TROPHS\", \"CK\" in the last 168 hours.    Recent Labs   Lab 04/19/25  0549   PTP 14.5   INR 1.12        Microbiology    No results found for this visit on 04/18/25.      Imaging: Reviewed in Epic.    Medications: Scheduled Medications[1]    Assessment & Plan:      #Diffuse large B cell lymphoma  -acyclovir, allopurinol, steroids  -MRI brain shows no metastatic disease, high FLAIR signal abnormality in left frontal lobe  -S/p LP on 4/19 prior to starting chemo  -CSF final results pending  -Started cycle 1 R-CHOP chemotherapy on 4/19  -s/p rituximab  -oncology following    #Tumor lysis syndrome  #Hyperkalemia  #Hyperphosphatemia  # Hyperuricemia  -S/p rasburicase on 4/18 and 4/19  -IV fluids  -Allopurinol  -Calcium gluconate, insulin, D50, Lokelma  -temporary catheter placed for HD 4/21 > s/p HD 4/21  -Nephrology following    #LISSETT, d/t tumor lysis syndrome/hypercalcemia   -follow BMP  - Status post IV fluids  -Status post IV Bumex today per nephrology  - Status post HD initially on admission per nephrology  -nephrology following     #BLLE edema  -PRN laisx     #Hypercalcemia d/t TLS  -IVF, HD, management as above   -Nephrology following    #Cancer-related pain  -Oxycodone as needed    #HTN  -controlled  -not on medications    # Cancer related pain  - Pain meds as needed    # Situational anxiety, depression  - Patient refused any new anxiety or depression medications at this time  - Discussed regarding psychotherapy, patient states he does not believe this will help either, will have psych liaison consult to at least give patient resources and initiate psychotherapy    Discussed with patient,  RN    PT OT fernando Phoenix will follow from morning tomorrow    Joycelyn De Leon MD        Supplementary Documentation:     Quality:  DVT Mechanical Prophylaxis:   SCDs,    DVT Pharmacologic Prophylaxis   Medication    [Held by provider] enoxaparin (Lovenox) 40 MG/0.4ML SUBQ injection 40 mg                Code Status: Full Code  Crowley: No urinary catheter in place  Crowley Duration (in days):   Central line:    KAY:     Discharge is dependent on: Clinical improvement  At this point Mr. Torres is expected to be discharge to: Home    The 21st Century Cures Act makes medical notes like these available to patients in the interest of transparency. Please be advised this is a medical document. Medical documents are intended to carry relevant information, facts as evident, and the clinical opinion of the practitioner. The medical note is intended as peer to peer communication and may appear blunt or direct. It is written in medical language and may contain abbreviations or verbiage that are unfamiliar.     Dietitian Malnutrition Assessment    Evaluation for Malnutrition:                       RD Malnutrition Care Plan:      Body Fat/Muscle Mass:          Physician Assessment     Patient has a diagnosis of moderate malnutrition        **Certification      PHYSICIAN Certification of Need for Inpatient Hospitalization - Initial Certification    Patient will require inpatient services that will reasonably be expected to span two midnight's based on the clinical documentation in H+P.   Based on patients current state of illness, I anticipate that, after discharge, patient will require TBD.           [1]    potassium chloride  40 mEq Oral Once    acyclovir  200 mg Oral BID    allopurinol  300 mg Oral BID    [Held by provider] enoxaparin  40 mg Subcutaneous Daily

## 2025-04-25 NOTE — PLAN OF CARE
Pt A&O x4. C/o of abdominal and shoulder pain, controlled with PRN Dilaudid. Zofran PRN given for nausea. IVF running. Call light within reach. Fall precautions in place. Meds given per MAR.  Problem: GASTROINTESTINAL - ADULT  Goal: Maintains adequate nutritional intake (undernourished)  Description: INTERVENTIONS:- Monitor percentage of each meal consumed- Identify factors contributing to decreased intake, treat as appropriate- Assist with meals as needed- Monitor I&O, WT and lab values- Obtain nutritional consult as needed- Optimize oral hygiene and moisture- Encourage food from home; allow for food preferences- Enhance eating environment  Outcome: Progressing     Problem: METABOLIC/FLUID AND ELECTROLYTES - ADULT  Goal: Electrolytes maintained within normal limits  Description: INTERVENTIONS:- Monitor labs and rhythm and assess patient for signs and symptoms of electrolyte imbalances- Administer electrolyte replacement as ordered- Monitor response to electrolyte replacements, including rhythm and repeat lab results as appropriate- Fluid restriction as ordered- Instruct patient on fluid and nutrition restrictions as appropriate  Outcome: Progressing  Goal: Hemodynamic stability and optimal renal function maintained  Description: INTERVENTIONS:- Monitor labs and assess for signs and symptoms of volume excess or deficit- Monitor intake, output and patient weight- Monitor urine specific gravity, serum osmolarity and serum sodium as indicated or ordered- Monitor response to interventions for patient's volume status, including labs, urine output, blood pressure (other measures as available)- Encourage oral intake as appropriate- Instruct patient on fluid and nutrition restrictions as appropriate  Outcome: Progressing     Problem: SKIN/TISSUE INTEGRITY - ADULT  Goal: Incision(s), wounds(s) or drain site(s) healing without S/S of infection  Description: INTERVENTIONS:- Assess and document risk factors for pressure ulcer  development- Assess and document skin integrity- Assess and document dressing/incision, wound bed, drain sites and surrounding tissue- Implement wound care per orders- Initiate isolation precautions as appropriate- Initiate Pressure Ulcer prevention bundle as indicated  Outcome: Progressing     Problem: HEMATOLOGIC - ADULT  Goal: Maintains hematologic stability  Description: INTERVENTIONS- Assess for signs and symptoms of bleeding or hemorrhage- Monitor labs and vital signs for trends- Administer supportive blood products/factors, fluids and medications as ordered and appropriate- Administer supportive blood products/factors as ordered and appropriate  Outcome: Progressing     Problem: PAIN - ADULT  Goal: Verbalizes/displays adequate comfort level or patient's stated pain goal  Description: INTERVENTIONS:- Encourage pt to monitor pain and request assistance- Assess pain using appropriate pain scale- Administer analgesics based on type and severity of pain and evaluate response- Implement non-pharmacological measures as appropriate and evaluate response- Consider cultural and social influences on pain and pain management- Manage/alleviate anxiety- Utilize distraction and/or relaxation techniques- Monitor for opioid side effects- Notify MD/LIP if interventions unsuccessful or patient reports new pain- Anticipate increased pain with activity and pre-medicate as appropriate  Outcome: Progressing

## 2025-04-25 NOTE — PROGRESS NOTES
[facility]   part of [facility]     Nephrology Progress Note    Lencho Torres Patient Status:  Inpatient    1955 MRN [id number]   Location [facility] 4NW-A Attending Joycelyn De Leon MD   Hosp Day # 7 PCP Shayan Harris MD       SUBJECTIVE:  Stable this AM        Physical Exam:   BP (!) 149/99 (BP Location: Left arm)   Pulse 109   Temp 98.1 °F (36.7 °C) (Oral)   Resp 20   Wt 169 lb 9.6 oz (76.9 kg)   SpO2 98%   BMI 23.00 kg/m²   Temp (24hrs), Av °F (36.7 °C), Min:97.8 °F (36.6 °C), Max:98.1 °F (36.7 °C)       Intake/Output Summary (Last 24 hours) at 2025 1112  Last data filed at 2025 0755  Gross per 24 hour   Intake 3600 ml   Output 3330 ml   Net 270 ml     Last 3 Weights   25 0440 169 lb 9.6 oz (76.9 kg)   25 0500 168 lb 3.2 oz (76.3 kg)   25 0700 164 lb 7.4 oz (74.6 kg)   25 1341 147 lb 3.2 oz (66.8 kg)   25 1000 166 lb 3.6 oz (75.4 kg)   25 1314 166 lb 3.2 oz (75.4 kg)   25 1915 178 lb 9.6 oz (81 kg)   25 1731 176 lb (79.8 kg)   25 1303 176 lb (79.8 kg)   25 1109 176 lb 6.4 oz (80 kg)   22 1013 174 lb 3.2 oz (79 kg)     General: Alert and oriented in no apparent distress.  HEENT: No scleral icterus, MMM  Neck: Supple, no DULCE or thyromegaly  Cardiac: Regular rate and rhythm, S1, S2 normal, no murmur or rub  Lungs: Clear without wheezes, rales, rhonchi.    Abdomen: Soft, non-tender. + bowel sounds, no palpable organomegaly  Extremities: + edema.  Neurologic: moving all extremities  Skin: Warm and dry, no rash        Labs:     Recent Labs   Lab 25  1653 25  0549 25  0511 25  0453 25  1119   WBC 10.2 11.1* 11.3* 9.0 5.1   HGB 15.4 14.8 14.0 12.6* 12.5*   MCV 91.2 89.7 92.4 92.0 89.4   .0 218.0 174.0 137.0* 84.0*   BAND  --   --  5  --   --    INR  --  1.12  --   --   --        Recent Labs   Lab 25  2253 25  0549 25  1211 25  0511 25  0633  04/21/25  0453 04/21/25  1656 04/22/25  0317 04/23/25  0550 04/24/25  0550 04/25/25  0500   * 134*   < > 135*   < > 140 142 141 139 140 145   K 5.3* 4.8   < > 6.9*   < > 5.8* 4.1 3.8 3.9 3.6 3.4*  3.4*   CL 90* 92*   < > 99   < > 102 103 104 104 106 112   CO2 23.0 25.0   < > 21.0   < > 20.0* 23.0 26.0 24.0 22.0 20.0*   BUN 48* 44*   < > 41*   < > 77* 66* 42* 58* 56* 46*   CREATSERUM 1.54* 1.47*   < > 1.29   < > 1.42* 1.02 1.07 1.90* 2.32* 2.12*   CA 11.0* 11.2*   < > 9.7   < > 6.9* 7.4* 7.5* 6.6* 6.7* 6.1*   MG 2.4 2.4  --  2.4  --   --   --   --  2.4 2.1  --    PHOS 4.3 3.4   < > 10.6*   < > 17.4*  --  8.6* 9.3* 7.8* 5.2*   GLU 87 93   < > 152*   < > 224* 152* 129* 128* 109* 93    < > = values in this interval not displayed.       Recent Labs   Lab 04/18/25  2253 04/19/25  0549 04/19/25  1211 04/20/25  0511 04/21/25  0453 04/22/25  0317 04/25/25  0500   ALT 27 27 28 27 63* 50* 37   AST 58* 71* 77* 67* 377* 167* 42*   ALB 3.9 3.9 3.8 3.7 3.4 3.3 2.8*   LDH 2,514* 3,279* 3,556* 3,553* >4,500*  --   --        Recent Labs   Lab 04/18/25  1807 04/20/25  0707 04/20/25  0907 04/20/25  1008 04/20/25  1120   PGLU 89 159* 140* 173* 197*       Meds:   Current Hospital Medications[1]      Impression/Plan:         1.Hyperkalemia: Due to severe TLS. Was admitted for chemo initiation in setting of newly diagnosed aggressive DLBCL with high tumor burden. Resolved with HD x 1 and has been stable.  Cont to monitor     2.Hyperphos: 2/2 above. S/p HD. Steadily better.  Stop phos binder     3. DLBCL: admitted for R-CHOP, received vincristine, cytoxan and doxorubicin 4/19. S/p Rituximab - per heme.     4. Hyperuricemia: 2/2 TLS. S/p rasburicase x2. Improved.      5. Acute kidney injury: baseline serum creatinine ~0.8-0.9 mg/dL. Worsening serum creatinine 2/2 likely TLS.. creat improved today and would expect recovery. Stop IVF.  Prn IV diuretics    Questions/concerns were discussed with patient and/or family by  bedside.        Nico Whitman MD  4/25/2025  11:13 AM           [1]   Current Facility-Administered Medications   Medication Dose Route Frequency    potassium chloride (Klor-Con M20) tab 40 mEq  40 mEq Oral Once    HYDROmorphone (Dilaudid) 1 MG/ML injection 0.2 mg  0.2 mg Intravenous Q2H PRN    Or    HYDROmorphone (Dilaudid) 1 MG/ML injection 0.4 mg  0.4 mg Intravenous Q2H PRN    acyclovir (Zovirax) cap 200 mg  200 mg Oral BID    sodium chloride 0.9% infusion   Intravenous Continuous    sevelamer carbonate (Renvela) tab 800 mg  800 mg Oral TID CC    allopurinol (Zyloprim) tab 300 mg  300 mg Oral BID    prochlorperazine (Compazine) 10 MG/2ML injection 10 mg  10 mg Intravenous Q6H PRN    melatonin tab 3 mg  3 mg Oral Nightly PRN    ondansetron (Zofran) 4 MG/2ML injection 4 mg  4 mg Intravenous Q6H PRN    [Held by provider] enoxaparin (Lovenox) 40 MG/0.4ML SUBQ injection 40 mg  40 mg Subcutaneous Daily    polyethylene glycol (PEG 3350) (Miralax) 17 g oral packet 17 g  17 g Oral Daily PRN    sennosides (Senokot) tab 17.2 mg  17.2 mg Oral Nightly PRN    bisacodyl (Dulcolax) 10 MG rectal suppository 10 mg  10 mg Rectal Daily PRN    fleet enema (Fleet) rectal enema 133 mL  1 enema Rectal Once PRN

## 2025-04-25 NOTE — PLAN OF CARE
Patient is alert and orientated, VSS, RA, afebrile and does not complain of pain. Patient has IV Tylenol for pain if needed. Patient has PICC and dressing was changed, saline locked with blood return. IV diuretics given. Patient nauseous and Zofran given twice, appetite low. K+ protocol activated, patient refused but said he would try to eat more to compensate. Suggested walk, patient would like to take walk during night shift. Call light and safety precautions in place.      Problem: GASTROINTESTINAL - ADULT  Goal: Maintains adequate nutritional intake (undernourished)  Description: INTERVENTIONS:- Monitor percentage of each meal consumed- Identify factors contributing to decreased intake, treat as appropriate- Assist with meals as needed- Monitor I&O, WT and lab values- Obtain nutritional consult as needed- Optimize oral hygiene and moisture- Encourage food from home; allow for food preferences- Enhance eating environment  Outcome: Progressing     Problem: METABOLIC/FLUID AND ELECTROLYTES - ADULT  Goal: Electrolytes maintained within normal limits  Description: INTERVENTIONS:- Monitor labs and rhythm and assess patient for signs and symptoms of electrolyte imbalances- Administer electrolyte replacement as ordered- Monitor response to electrolyte replacements, including rhythm and repeat lab results as appropriate- Fluid restriction as ordered- Instruct patient on fluid and nutrition restrictions as appropriate  Outcome: Progressing  Goal: Hemodynamic stability and optimal renal function maintained  Description: INTERVENTIONS:- Monitor labs and assess for signs and symptoms of volume excess or deficit- Monitor intake, output and patient weight- Monitor urine specific gravity, serum osmolarity and serum sodium as indicated or ordered- Monitor response to interventions for patient's volume status, including labs, urine output, blood pressure (other measures as available)- Encourage oral intake as appropriate-  Instruct patient on fluid and nutrition restrictions as appropriate  Outcome: Progressing     Problem: SKIN/TISSUE INTEGRITY - ADULT  Goal: Incision(s), wounds(s) or drain site(s) healing without S/S of infection  Description: INTERVENTIONS:- Assess and document risk factors for pressure ulcer development- Assess and document skin integrity- Assess and document dressing/incision, wound bed, drain sites and surrounding tissue- Implement wound care per orders- Initiate isolation precautions as appropriate- Initiate Pressure Ulcer prevention bundle as indicated  Outcome: Progressing     Problem: HEMATOLOGIC - ADULT  Goal: Maintains hematologic stability  Description: INTERVENTIONS- Assess for signs and symptoms of bleeding or hemorrhage- Monitor labs and vital signs for trends- Administer supportive blood products/factors, fluids and medications as ordered and appropriate- Administer supportive blood products/factors as ordered and appropriate  Outcome: Progressing     Problem: PAIN - ADULT  Goal: Verbalizes/displays adequate comfort level or patient's stated pain goal  Description: INTERVENTIONS:- Encourage pt to monitor pain and request assistance- Assess pain using appropriate pain scale- Administer analgesics based on type and severity of pain and evaluate response- Implement non-pharmacological measures as appropriate and evaluate response- Consider cultural and social influences on pain and pain management- Manage/alleviate anxiety- Utilize distraction and/or relaxation techniques- Monitor for opioid side effects- Notify MD/LIP if interventions unsuccessful or patient reports new pain- Anticipate increased pain with activity and pre-medicate as appropriate  Outcome: Progressing

## 2025-04-26 PROBLEM — N17.0 ATN (ACUTE TUBULAR NECROSIS): Status: ACTIVE | Noted: 2025-04-26

## 2025-04-26 PROBLEM — N17.0 ATN (ACUTE TUBULAR NECROSIS): Status: ACTIVE | Noted: 2025-01-01

## 2025-04-26 NOTE — PROGRESS NOTES
[facility]   part of [facility]     Hospitalist Progress Note     Lencho Torres Patient Status:  Inpatient    1955 MRN [id number]   [facility] 4NW-A Attending Elliot Phoenix MD   Hosp Day # 8 PCP Shayan Harris MD     Chief Complaint: abd pain     Subjective:     Patient has c/o abd pain.  Worse at night    Objective:    Review of Systems:   ROS completed; pertinent positive and negatives stated in subjective.    Vital signs:  Temp:  [97.9 °F (36.6 °C)-98.3 °F (36.8 °C)] 98.2 °F (36.8 °C)  Pulse:  [] 99  Resp:  [14-20] 14  BP: (121-149)/(88-97) 140/93  SpO2:  [97 %-100 %] 97 %    Physical Exam:    General: No acute distress  Respiratory: Clear to auscultation bilaterally  Cardiovascular: S1, S2.  Abdomen: Soft  Neuro: No new focal deficits      Diagnostic Data:    Labs:  Recent Labs   Lab 25  0511 25  0453 25  1119   WBC 11.3* 9.0 5.1   HGB 14.0 12.6* 12.5*   MCV 92.4 92.0 89.4   .0 137.0* 84.0*   BAND 5  --   --        Recent Labs   Lab 25  0317 25  0550 25  0550 25  0500 25  0545   *   < > 109* 93 109*   BUN 42*   < > 56* 46* 45*   CREATSERUM 1.07   < > 2.32* 2.12* 2.15*   CA 7.5*   < > 6.7* 6.1* 6.7*   ALB 3.3  --   --  2.8* 3.1*      < > 140 145 145   K 3.8   < > 3.6 3.4*  3.4* 3.4*  3.4*      < > 106 112 109   CO2 26.0   < > 22.0 20.0* 24.0   ALKPHO 176*  --   --  138* 149*   *  --   --  42* 99*   ALT 50*  --   --  37 79*   BILT 0.4  --   --  1.0 1.1   TP 5.1*  --   --  4.5* 4.8*    < > = values in this interval not displayed.       Estimated Glomerular Filtration Rate: 33 mL/min/1.73m2 (A) (result from lab).     No results for input(s): \"TROP\", \"TROPHS\", \"CK\" in the last 168 hours.    No results for input(s): \"PTP\", \"INR\" in the last 168 hours.           Imaging: Imaging data reviewed in Epic.    Medications: Scheduled Medications[1]    Assessment & Plan:     #Diffuse large B cell  lymphoma  -acyclovir, allopurinol  -MRI brain shows no metastatic disease, high FLAIR signal abnormality in left frontal lobe  -S/p LP on 4/19 prior to starting chemo  -CSF final results pending  -Started cycle 1 R-CHOP chemotherapy on 4/19  -s/p rituximab  -oncology following     #Tumor lysis syndrome  -S/p rasburicase on 4/18 and 4/19  -Allopurinol  -temporary catheter placed for HD 4/21 > s/p HD 4/21  No further HD needed     #LISSETT, d/t tumor lysis syndrome/hypercalcemia   -follow BMP  - Status post IV fluids  -Status post IV Bumex today per nephrology  - Status post HD initially on admission per nephrology  -nephrology following      #BLLE edema  -PRN laisx      #Hypercalcemia d/t TLS  -Nephrology following     #Cancer-related pain  Cont. Pain control, meds adjusted     # Situational anxiety, depression  - Patient refused any new anxiety or depression medications at this time    #Hypokalemia  Replace and monitor      Elliot Phoenix MD    Supplementary Documentation:     Quality:    DVT Mechanical Prophylaxis:   SCDs,    DVT Pharmacologic Prophylaxis   Medication    [Held by provider] enoxaparin (Lovenox) 40 MG/0.4ML SUBQ injection 40 mg                Code Status: Full Code  Crowley: No urinary catheter in place  [place] Duration (in days):   Central line:    KAY:       Discharge is dependent on: clinical stability  At this point Mr. Torres is expected to be discharge to: home    The 21st Century Cures Act makes medical notes like these available to patients in the interest of transparency. Please be advised this is a medical document. Medical documents are intended to carry relevant information, facts as evident, and the clinical opinion of the practitioner. The medical note is intended as peer to peer communication and may appear blunt or direct. It is written in medical language and may contain abbreviations or verbiage that are unfamiliar.                       [1]    acyclovir  200 mg Oral BID    allopurinol  300  mg Oral BID    [Held by provider] enoxaparin  40 mg Subcutaneous Daily

## 2025-04-26 NOTE — OCCUPATIONAL THERAPY NOTE
OT order received and chart reviewed. Patient has been too nauseated today to participate in therapy per RN.  Will re-attempt at a later date.

## 2025-04-26 NOTE — PLAN OF CARE
Pt A&O x 4. PRN IV tylenol given once for pain with relief. PRN Zofran given once for nausea with some relief. Call light within reach. Safety precautions in place. Tolerated walk in [facility].  Problem: GASTROINTESTINAL - ADULT  Goal: Maintains adequate nutritional intake (undernourished)  Description: INTERVENTIONS:- Monitor percentage of each meal consumed- Identify factors contributing to decreased intake, treat as appropriate- Assist with meals as needed- Monitor I&O, WT and lab values- Obtain nutritional consult as needed- Optimize oral hygiene and moisture- Encourage food from home; allow for food preferences- Enhance eating environment  Outcome: Progressing     Problem: METABOLIC/FLUID AND ELECTROLYTES - ADULT  Goal: Electrolytes maintained within normal limits  Description: INTERVENTIONS:- Monitor labs and rhythm and assess patient for signs and symptoms of electrolyte imbalances- Administer electrolyte replacement as ordered- Monitor response to electrolyte replacements, including rhythm and repeat lab results as appropriate- Fluid restriction as ordered- Instruct patient on fluid and nutrition restrictions as appropriate  Outcome: Progressing  Goal: Hemodynamic stability and optimal renal function maintained  Description: INTERVENTIONS:- Monitor labs and assess for signs and symptoms of volume excess or deficit- Monitor intake, output and patient weight- Monitor urine specific gravity, serum osmolarity and serum sodium as indicated or ordered- Monitor response to interventions for patient's volume status, including labs, urine output, blood pressure (other measures as available)- Encourage oral intake as appropriate- Instruct patient on fluid and nutrition restrictions as appropriate  Outcome: Progressing     Problem: SKIN/TISSUE INTEGRITY - ADULT  Goal: Incision(s), wounds(s) or drain site(s) healing without S/S of infection  Description: INTERVENTIONS:- Assess and document risk factors for pressure  ulcer development- Assess and document skin integrity- Assess and document dressing/incision, wound bed, drain sites and surrounding tissue- Implement wound care per orders- Initiate isolation precautions as appropriate- Initiate Pressure Ulcer prevention bundle as indicated  Outcome: Progressing     Problem: HEMATOLOGIC - ADULT  Goal: Maintains hematologic stability  Description: INTERVENTIONS- Assess for signs and symptoms of bleeding or hemorrhage- Monitor labs and vital signs for trends- Administer supportive blood products/factors, fluids and medications as ordered and appropriate- Administer supportive blood products/factors as ordered and appropriate  Outcome: Progressing     Problem: PAIN - ADULT  Goal: Verbalizes/displays adequate comfort level or patient's stated pain goal  Description: INTERVENTIONS:- Encourage pt to monitor pain and request assistance- Assess pain using appropriate pain scale- Administer analgesics based on type and severity of pain and evaluate response- Implement non-pharmacological measures as appropriate and evaluate response- Consider cultural and social influences on pain and pain management- Manage/alleviate anxiety- Utilize distraction and/or relaxation techniques- Monitor for opioid side effects- Notify MD/LIP if interventions unsuccessful or patient reports new pain- Anticipate increased pain with activity and pre-medicate as appropriate  Outcome: Progressing

## 2025-04-26 NOTE — PHYSICAL THERAPY NOTE
Orders received, chart reviewed.  Attempted to see patient this AM and this PM; per RN, patient has been nauseated and not appropriate for PT at this time.  Will continue to follow-up and see when medically able.

## 2025-04-26 NOTE — PROGRESS NOTES
[facility]  Nephrology Progress Note    Lencho Torres Attending:  Elliot Phoenix MD       Assessment and Plan:    1) LISSETT- ATN due to tumor lysis syndrome / marked hyperuricemia s/p HD x 1- improving. Do not anticipate further HD     2) Hyperkalemia / hyperphos- due to TLS; improving    3) DLBCL with high tumor burden and spontaneous TLS s/p R-CHOP     4) TLS s/p rasburicase x 2, now on allopurinol      Subjective:  Awake alert very pleasant in reasonable spirits no new c/o     Physical Exam:   BP (!) 141/97 (BP Location: Left arm)   Pulse 103   Temp 98.2 °F (36.8 °C) (Oral)   Resp 16   Wt 179 lb 14.4 oz (81.6 kg)   SpO2 98%   BMI 24.40 kg/m²   Temp (24hrs), Av.2 °F (36.8 °C), Min:98.1 °F (36.7 °C), Max:98.3 °F (36.8 °C)       Intake/Output Summary (Last 24 hours) at 2025 1026  Last data filed at 2025 0600  Gross per 24 hour   Intake 2003.8 ml   Output 3425 ml   Net -1421.2 ml     Wt Readings from Last 3 Encounters:   25 179 lb 14.4 oz (81.6 kg)   25 166 lb 3.2 oz (75.4 kg)   25 178 lb 9.6 oz (81 kg)     General: awake  HEENT: No scleral icterus, MMM  Neck: Supple, no DULCE or thyromegaly  Cardiac: Regular rate and rhythm, S1, S2 normal, no murmur or tub  Lungs: Decreased BS at bases bilaterally   Abdomen: Soft, non-tender. + bowel sounds, no palpable organomegaly  Extremities: Without clubbing, cyanosis; no edema  Neurologic: Cranial nerves grossly intact, moving all extremities  Skin: Warm and dry, no rashes       Labs:   Lab Results   Component Value Date    CREATSERUM 2.15 2025    BUN 45 2025     2025    K 3.4 2025    K 3.4 2025     2025    CO2 24.0 2025     2025    CA 6.7 2025    ALB 3.1 2025    ALKPHO 149 2025    BILT 1.1 2025    TP 4.8 2025    AST 99 2025    ALT 79 2025    PHOS 4.5 2025       Imaging:  All imaging studies reviewed.    Meds:   Current  Hospital Medications[1]      Questions/concerns were discussed with patient and/or family by bedside.          Juli Candelario MD  4/26/2025  10:26 AM         [1]   Current Facility-Administered Medications   Medication Dose Route Frequency    HYDROmorphone (Dilaudid) 1 MG/ML injection 0.1 mg  0.1 mg Intravenous Q2H PRN    Or    HYDROmorphone (Dilaudid) 1 MG/ML injection 0.2 mg  0.2 mg Intravenous Q2H PRN    senna (Senokot) 8.8 MG/5ML oral syrup 8.8 mg  5 mL Oral BID PRN    simethicone (Mylicon) chewable tab 125 mg  125 mg Oral QID PRN    acetaminophen (Ofirmev) 10 mg/mL infusion premix 1,000 mg  1,000 mg Intravenous Q8H PRN    potassium chloride (Klor-Con M20) tab 40 mEq  40 mEq Oral Once    potassium chloride (Klor-Con M20) tab 40 mEq  40 mEq Oral Once    acyclovir (Zovirax) cap 200 mg  200 mg Oral BID    allopurinol (Zyloprim) tab 300 mg  300 mg Oral BID    prochlorperazine (Compazine) 10 MG/2ML injection 10 mg  10 mg Intravenous Q6H PRN    melatonin tab 3 mg  3 mg Oral Nightly PRN    ondansetron (Zofran) 4 MG/2ML injection 4 mg  4 mg Intravenous Q6H PRN    [Held by provider] enoxaparin (Lovenox) 40 MG/0.4ML SUBQ injection 40 mg  40 mg Subcutaneous Daily    polyethylene glycol (PEG 3350) (Miralax) 17 g oral packet 17 g  17 g Oral Daily PRN    bisacodyl (Dulcolax) 10 MG rectal suppository 10 mg  10 mg Rectal Daily PRN    fleet enema (Fleet) rectal enema 133 mL  1 enema Rectal Once PRN

## 2025-04-26 NOTE — PROGRESS NOTES
Hematology/Oncology Progress Note    Patient Name: Lencho Torres  Medical Record Number: [id number]    YOB: 1955     Reason for Consultation:  Lencho Torres was seen today for the diagnosis of DLBCL    Interval events:      - sCr stable. Phos normal.   - feeling ok. Feels like his R inguinal LN is shrinking    Inpatient Meds:  Scheduled Medications[1]  Medication Infusions[2]    PRN Meds:  PRN Medications[3]    Allergies:   Allergies[4]    Vital Signs:  Height: --  Weight: 81.6 kg (179 lb 14.4 oz) (04/26 0552)  BSA (Calculated - sq m): --  Pulse: 103 (04/26 0806)  BP: 141/97 (04/26 0806)  Temp: 98.2 °F (36.8 °C) (04/26 0806)  Do Not Use - Resp Rate: --  SpO2: 98 % (04/26 0806)    Wt Readings from Last 6 Encounters:   04/26/25 81.6 kg (179 lb 14.4 oz)   04/17/25 75.4 kg (166 lb 3.2 oz)   04/09/25 81 kg (178 lb 9.6 oz)   04/08/25 80 kg (176 lb 6.4 oz)   09/01/22 79 kg (174 lb 3.2 oz)   02/07/22 83 kg (183 lb)       Physical Examination:  General: Patient is alert and oriented, not in acute distress  Psych: Mood and affect are appropriate  Eyes: EOMI, PERRL  ENT: Oropharynx is clear, no adenopathy  CV: no LE edema  Respiratory: Non-labored respirations  GI/Abd: Soft, non-tender   Laboratory:  Recent Labs   Lab 04/20/25  0511 04/21/25  0453 04/22/25  1119   WBC 11.3* 9.0 5.1   HGB 14.0 12.6* 12.5*   HCT 41.3 36.7* 36.4*   .0 137.0* 84.0*   MCV 92.4 92.0 89.4   RDW 11.9 11.7 11.8   NEPRELIM 9.53* 8.60* 4.84       Recent Labs   Lab 04/22/25  0317 04/23/25  0550 04/24/25  0550 04/25/25  0500 04/26/25  0545      < > 140 145 145   K 3.8   < > 3.6 3.4*  3.4* 3.4*  3.4*      < > 106 112 109   CO2 26.0   < > 22.0 20.0* 24.0   BUN 42*   < > 56* 46* 45*   CREATSERUM 1.07   < > 2.32* 2.12* 2.15*   *   < > 109* 93 109*   CA 7.5*   < > 6.7* 6.1* 6.7*   PHOS 8.6*   < > 7.8* 5.2* 4.5   TP 5.1*  --   --  4.5* 4.8*   ALB 3.3  --   --  2.8* 3.1*   ALKPHO 176*  --   --  138* 149*   *  --   --   42* 99*   ALT 50*  --   --  37 79*   BILT 0.4  --   --  1.0 1.1    < > = values in this interval not displayed.       No results for input(s): \"PT\", \"INR\", \"PTT\" in the last 168 hours.    Impression & Plan:     DLBCL, transformed from FL  TLS  - high tumor burden c/b spontaneous TLS. FISH 4/10 for myc/bcl-2/bcl-6 pending  - PET/CT with widespread disease  - s/p C1 R-CHOP 4/19  - appears to be out of the woods with his TLS; sCr has stabilized, uric acid stable, electrolytes not elevated  - continue renvela  Nephrology following, appreciate assistance  - continue acyclovir 400mg BID    Discussed likely dispo Monday if his labs improve     Moises Chacon MD  [facility] Hematology Oncology             [1]    potassium chloride  40 mEq Oral Once    potassium chloride  40 mEq Oral Once    acyclovir  200 mg Oral BID    allopurinol  300 mg Oral BID    [Held by provider] enoxaparin  40 mg Subcutaneous Daily   [2] [3]   HYDROmorphone **OR** HYDROmorphone    senna    simethicone    acetaminophen    prochlorperazine    melatonin    ondansetron    polyethylene glycol (PEG 3350)    bisacodyl    fleet enema  [4]   Allergies  Allergen Reactions    Ibuprofen WHEEZING

## 2025-04-27 NOTE — PROGRESS NOTES
[facility]   part of [facility]     Hospitalist Progress Note     Lencho Torres Patient Status:  Inpatient    1955 MRN [id number]   Formerly [facility] 4NW-A Attending Elliot Phoenix MD   Hosp Day # 9 PCP Shayan Harris MD     Chief Complaint: abd pain     Subjective:     Patient feels a littile better    Objective:    Review of Systems:   ROS completed; pertinent positive and negatives stated in subjective.    Vital signs:  Temp:  [97.5 °F (36.4 °C)-98.7 °F (37.1 °C)] 97.8 °F (36.6 °C)  Pulse:  [] 99  Resp:  [14-19] 16  BP: (136-158)/(78-93) 158/80  SpO2:  [97 %-99 %] 98 %    Physical Exam:    General: No acute distress  Respiratory: Clear to auscultation bilaterally  Cardiovascular: S1, S2.  Abdomen: Soft  Neuro: No new focal deficits      Diagnostic Data:    Labs:  Recent Labs   Lab 25  0453 25  1119 25  0611   WBC 9.0 5.1 0.2*   HGB 12.6* 12.5* 12.9*   MCV 92.0 89.4 88.1   .0* 84.0* 35.0*       Recent Labs   Lab 25  0500 25  0545 25  0611   GLU 93 109* 102*   BUN 46* 45* 38*   CREATSERUM 2.12* 2.15* 1.94*   CA 6.1* 6.7* 7.0*   ALB 2.8* 3.1* 3.2    145 143   K 3.4*  3.4* 3.4*  3.4* 3.4*    109 108   CO2 20.0* 24.0 26.0   ALKPHO 138* 149* 137*   AST 42* 99* 30   ALT 37 79* 50*   BILT 1.0 1.1 1.2*   TP 4.5* 4.8* 5.0*       Estimated Glomerular Filtration Rate: 37 mL/min/1.73m2 (A) (result from lab).     No results for input(s): \"TROP\", \"TROPHS\", \"CK\" in the last 168 hours.    No results for input(s): \"PTP\", \"INR\" in the last 168 hours.           Imaging: Imaging data reviewed in Epic.    Medications: Scheduled Medications[1]    Assessment & Plan:     #Diffuse large B cell lymphoma  -acyclovir, allopurinol  -MRI brain shows no metastatic disease, high FLAIR signal abnormality in left frontal lobe  -S/p LP on  prior to starting chemo  -CSF final results pending  -Started cycle 1 R-CHOP chemotherapy on   -s/p rituximab    #Chemo  induced pancytopenia  GSCSF daily and monitor     #Tumor lysis syndrome  -S/p rasburicase on 4/18 and 4/19  -Allopurinol  -temporary catheter placed for HD 4/21 > s/p HD 4/21  No further HD needed     #LISSETT, d/t tumor lysis syndrome/hypercalcemia   Cr improving  Volume status stable  No further fluids/diuretics for now  #BLLE edema  -PRN laisx      #Hypercalcemia d/t TLS  -Nephrology following     #Cancer-related pain  Cont. Pain control, meds adjusted     # Situational anxiety, depression  - Patient refused any new anxiety or depression medications at this time    #Hypokalemia  Replace and monitor    Dispo: as above.  TLS improving.  Monitor counts.        Elliot Phoenix MD    Supplementary Documentation:     Quality:    DVT Mechanical Prophylaxis:   SCDs,    DVT Pharmacologic Prophylaxis   Medication    [Held by provider] enoxaparin (Lovenox) 40 MG/0.4ML SUBQ injection 40 mg                Code Status: Full Code  Crowley: No urinary catheter in place  [place] Duration (in days):   Central line:    KAY:       Discharge is dependent on: clinical stability  At this point Mr. Torres is expected to be discharge to: home    The 21st Century Cures Act makes medical notes like these available to patients in the interest of transparency. Please be advised this is a medical document. Medical documents are intended to carry relevant information, facts as evident, and the clinical opinion of the practitioner. The medical note is intended as peer to peer communication and may appear blunt or direct. It is written in medical language and may contain abbreviations or verbiage that are unfamiliar.                       [1]    [START ON 4/28/2025] filgrastim-aafi  480 mcg Subcutaneous Daily@1600    potassium chloride  20 mEq Intravenous Once    acyclovir  400 mg Oral BID    allopurinol  300 mg Oral BID    [Held by provider] enoxaparin  40 mg Subcutaneous Daily

## 2025-04-27 NOTE — PROGRESS NOTES
Hematology/Oncology Progress Note    Patient Name: Lencho Torres  Medical Record Number: [id number]    YOB: 1955     Reason for Consultation:  Lencho Torres was seen today for the diagnosis of DLBCL    Interval events:      - sCr improving  - neutropenic today, ANC 0.04  - hgb 12.9, plt 35k    Inpatient Meds:  Scheduled Medications[1]  Medication Infusions[2]    PRN Meds:  PRN Medications[3]    Allergies:   Allergies[4]    Vital Signs:  Height: --  Weight: 80.2 kg (176 lb 11.2 oz) (04/27 0500)  BSA (Calculated - sq m): --  Pulse: 99 (04/27 0824)  BP: 158/80 (04/27 0824)  Temp: 97.8 °F (36.6 °C) (04/27 0824)  Do Not Use - Resp Rate: --  SpO2: 98 % (04/27 0824)    Wt Readings from Last 6 Encounters:   04/27/25 80.2 kg (176 lb 11.2 oz)   04/17/25 75.4 kg (166 lb 3.2 oz)   04/09/25 81 kg (178 lb 9.6 oz)   04/08/25 80 kg (176 lb 6.4 oz)   09/01/22 79 kg (174 lb 3.2 oz)   02/07/22 83 kg (183 lb)       Physical Examination:  General: Patient is alert and oriented, not in acute distress  Psych: Mood and affect are appropriate  Eyes: EOMI, PERRL  ENT: Oropharynx is clear, no adenopathy  CV: no LE edema  Respiratory: Non-labored respirations  GI/Abd: Soft, non-tender   Laboratory:  Recent Labs   Lab 04/21/25  0453 04/22/25  1119 04/27/25  0611   WBC 9.0 5.1 0.2*   HGB 12.6* 12.5* 12.9*   HCT 36.7* 36.4* 37.0*   .0* 84.0* 35.0*   MCV 92.0 89.4 88.1   RDW 11.7 11.8 11.9   NEPRELIM 8.60* 4.84 0.04*       Recent Labs   Lab 04/25/25  0500 04/26/25  0545 04/27/25  0611    145 143   K 3.4*  3.4* 3.4*  3.4* 3.4*    109 108   CO2 20.0* 24.0 26.0   BUN 46* 45* 38*   CREATSERUM 2.12* 2.15* 1.94*   GLU 93 109* 102*   CA 6.1* 6.7* 7.0*   PHOS 5.2* 4.5 3.6   TP 4.5* 4.8* 5.0*   ALB 2.8* 3.1* 3.2   ALKPHO 138* 149* 137*   AST 42* 99* 30   ALT 37 79* 50*   BILT 1.0 1.1 1.2*       No results for input(s): \"PT\", \"INR\", \"PTT\" in the last 168 hours.    Impression & Plan:     DLBCL, transformed from FL  TLS  - high  tumor burden c/b spontaneous TLS. FISH 4/10 for myc/bcl-2/bcl-6 pending  - PET/CT with widespread disease  - s/p C1 R-CHOP 4/19  - appears to be out of the woods with his TLS; sCr has stabilized, uric acid stable, electrolytes not elevated  - continue renvela  Nephrology following, appreciate assistance  - continue acyclovir 400mg BID    Neutropenia  - secondary to chemo  - will start GCSF 5mcg/kg daily to prevent neutropenia related complications/fever  - discussed with pt that ordinarily he would get peg-GCSF as outpatient but this is expected with chemo    Dr Taylor will see pt tomorrow    Moises Chacon MD  [facility] Hematology Oncology             [1]    filgrastim-aafi  480 mcg Subcutaneous Once    [START ON 4/28/2025] filgrastim-aafi  480 mcg Subcutaneous Daily@1600    potassium chloride  20 mEq Intravenous Once    acyclovir  400 mg Oral BID    allopurinol  300 mg Oral BID    [Held by provider] enoxaparin  40 mg Subcutaneous Daily   [2] [3]   oxyCODONE    senna    simethicone    acetaminophen    prochlorperazine    melatonin    ondansetron    polyethylene glycol (PEG 3350)    bisacodyl    fleet enema  [4]   Allergies  Allergen Reactions    Ibuprofen WHEEZING

## 2025-04-27 NOTE — PHYSICAL THERAPY NOTE
PHYSICAL THERAPY EVALUATION - INPATIENT     Room Number: 415/415-A  Evaluation Date: 4/27/2025  Type of Evaluation: Initial  Physician Order: PT Eval and Treat    Presenting Problem: acute tubular necrosis, lymphoma  Co-Morbidities : anxiety, NHL, LISSETT, HTN, chemotherapy  Reason for Therapy: Mobility Dysfunction and Discharge Planning    PHYSICAL THERAPY ASSESSMENT   Patient is a 69 year old male admitted 4/18/2025 for nausea and abdominal pain .  Prior to admission, patient's baseline is independent and ambulatory without device .  Patient is currently functioning near baseline with bed mobility and transfers.  Patient is requiring supervision as a result of the following impairments: decreased endurance/aerobic capacity and impaired standing  balance.  Physical Therapy will continue to follow for duration of hospitalization.    Patient will benefit from continued skilled PT Services for duration of hospitalization, however, given the patient is functioning near baseline level do not anticipate skilled therapy needs at discharge .    PLAN DURING HOSPITALIZATION  Nursing Mobility Recommendation : 1 Assist     PT Treatment Plan: Endurance, Gait training, Stair training, Balance training  Rehab Potential : Good  Frequency (Obs): 3x/week     CURRENT GOALS    Goal       Goal #1 Patient is able to demonstrate transfers Sit to/from Stand at assistance level: independent     Goal #2 Patient is able to ambulate 200 feet without assist device:  at assistance level: independent     Goal #3 Negotiate 1 flight of stairs mod ind   Goal #5    Goal #6    Goal Comments: Goals established on 4/27/2025      PHYSICAL THERAPY MEDICAL/SOCIAL HISTORY  History related to current admission: Patient is a 69 year old male admitted on 4/18/2025 from home for abdominal pain .  Pt diagnosed with acute tubular necrosis.    HOME SITUATION  Type of Home:  (lives alone in a townhouse with [address], ambulates without AD , he owns a cane, he drives  and works as , he has a neighbor that can help.)                                        Prior Level of Swisher: independent and ambulatory without device    SUBJECTIVE  \" Its just my balance because I havent moved in a while, you know that I'm a \"    OBJECTIVE  Precautions:  (limb alert)       WEIGHT BEARING RESTRICTION     PAIN ASSESSMENT  Ratin          COGNITION  Orientation Level:  oriented x4    RANGE OF MOTION AND STRENGTH ASSESSMENT  Upper extremity ROM and strength are within functional limits     Lower extremity ROM is within functional limits     Lower extremity strength is within functional limits     BALANCE  Static Sitting: Good  Dynamic Sitting: Good  Static Standing: Good  Dynamic Standing: Fair +    ADDITIONAL TESTS                                    ACTIVITY TOLERANCE                         O2 WALK       NEUROLOGICAL FINDINGS                        AM-PAC '6-Clicks' INPATIENT SHORT FORM - BASIC MOBILITY  How much difficulty does the patient currently have...  Patient Difficulty: Turning over in bed (including adjusting bedclothes, sheets and blankets)?: None   Patient Difficulty: Sitting down on and standing up from a chair with arms (e.g., wheelchair, bedside commode, etc.): None   Patient Difficulty: Moving from lying on back to sitting on the side of the bed?: None   How much help from another person does the patient currently need...   Help from Another: Moving to and from a bed to a chair (including a wheelchair)?: A Little   Help from Another: Need to walk in hospital room?: A Little   Help from Another: Climbing 3-5 steps with a railing?: None     AM-PAC Score:  Raw Score: 22   Approx Degree of Impairment: 20.91%   Standardized Score (AM-PAC Scale): 53.28   CMS Modifier (G-Code): CJ    FUNCTIONAL ABILITY STATUS  Gait Assessment   Functional Mobility/Gait Assessment  Gait Assistance: Supervision  Distance (ft): 140x2  Assistive Device: None  Pattern:  Comment (slight sway to the right , dec HS , short stride length)    Skilled Therapy Provided     Bed Mobility:  Rolling: independent   Supine to sit: independent    Sit to supine: independent      Transfer Mobility:  Sit to stand: supervision   Stand to sit: mod ind  Gait = 140x2 without device, supervision     Therapist's Comments: patient was noted to be holding on to the rail occasionally and demonstrated some slight sway during gait assessment, no significant LOB and SOB documented. Educated on importance of mobility and POC next session. He will be seen during acute care to determine need of device and stairs management . Patient is active and knowledgeable of exercises. If no decline in mobility during acute care , he can be dc at home with HEP.    Exercise/Education Provided:  Functional activity tolerated  Gait training    Patient End of Session: In bed, Needs met, Call light within reach, RN aware of session/findings, All patient questions and concerns addressed, Hospital anti-slip socks      Patient Evaluation Complexity Level:  History Low - no personal factors and/or co-morbidities   Examination of body systems Low -  addressing 1-2 elements   Clinical Presentation Low- Stable   Clinical Decision Making Low Complexity       PT Session Time: 25 minutes  Gait Training: 15 minutes  Therapeutic Activity:  minutes  Neuromuscular Re-education:  minutes  Therapeutic Exercise:  minutes

## 2025-04-27 NOTE — PLAN OF CARE
Patient alert and oriented x 4, room air, bowel sounds present, antiemetics per MAR, patient unable to tolerate medications PO, voiding adequate amounts, denies need for analgesics, PICC line saline locked, VSS.    0600-Labs drawn from PICC line, flushed and patent, dressing CDI.    0700-Call from lab, critical values WBC 0.2 and ANC 0.04, Dr Chacon notified.

## 2025-04-27 NOTE — PLAN OF CARE
Patient alert oriented times four, on room air,on telemetry sinus rhythm rate 70-80's. Patient denies having any pain, reciving prn  meds for nausea, patient taking italain ice,call light in reach, gait steady upon ambulation.

## 2025-04-28 PROBLEM — I49.01 VENTRICULAR FIBRILLATION (HCC): Status: ACTIVE | Noted: 2025-04-28

## 2025-04-28 PROBLEM — I49.01 VENTRICULAR FIBRILLATION (HCC): Status: ACTIVE | Noted: 2025-01-01

## 2025-04-28 NOTE — CONSULTS
INFECTIOUS DISEASE CONSULTATION    Lencho Torres Patient Status:  Inpatient    1955 MRN [id number]   Formerly [facility] 4SW-A Attending Elliot Phoenix MD   Hosp Day # 10 PCP Shayan Harris MD       Requested by Dr. Prasad    Reason for Consultation:  Neutropenia and instability    History of Present Illness:  Lencho Torres is a a(n) 69 year old male diagnosed with B cell lymphoma, admitted on  to receive inpatient chemotherapy.   PICC was placed on admission .       He developed tumor lysis syndrome with LISSETT and hyperkalemia.   A temprorary HD cathteter was placed on  and received a one time dialysis treatment.  He suffered a VFIB cardiac arrest on .         History:  Past Medical History[1]  Past Surgical History[2]  Family History[3]   reports that he quit smoking about 40 years ago. His smoking use included cigarettes. He started smoking about 62 years ago. He has a 22 pack-year smoking history. He has never used smokeless tobacco. He reports current alcohol use. He reports that he does not use drugs.      Allergies:  Allergies[4]    Medications:  Current Hospital Medications[5]  Medications Ordered Prior to Encounter[6]    Review of Systems:    A comprehensive 10 point review of systems was completed.  Pertinent positives and negatives noted in the the HPI.      Physical Exam:    Sedated and intubated  Vital signs: Temp:  [97.8 °F (36.6 °C)-99.8 °F (37.7 °C)] 99.7 °F (37.6 °C)  Pulse:  [] 105  Resp:  [16-20] 18  BP: ()/(56-90) 77/56  SpO2:  [95 %-100 %] 99 %  Body mass index is 23.52 kg/m².  HEENT: Moist mucous membranes. Extraocular muscles are intact.  HD catheter  PICC in place  Neck: No swelling, no masses  Respiratory: Non labored, symmetric exursion  Cardiovascular: No irregularities in rhythm  Abdomen: Soft, nontender, nondistended.    Musculoskeletal: Full range of motion of all extremities.  No swelling  noted.  Joints: no effusions  Skin: No lesions. No erythema, no open wounds    Laboratory Data:  Laboratory data reviewed      Recent Labs   Lab 04/27/25  0611   RBC 4.20   HGB 12.9*   HCT 37.0*   MCV 88.1   MCH 30.7   MCHC 34.9   RDW 11.9   NEPRELIM 0.04*   WBC 0.2*   PLT 35.0*       Recent Labs   Lab 04/26/25  0545 04/27/25  0611 04/28/25  0549   * 102* 102*   BUN 45* 38* 28*   CREATSERUM 2.15* 1.94* 1.61*   CA 6.7* 7.0* 7.0*   ALB 3.1* 3.2 3.1*    143 142   K 3.4*  3.4* 3.4* 3.2*    108 107   CO2 24.0 26.0 26.0   ALKPHO 149* 137* 123*   AST 99* 30 21   ALT 79* 50* 37   BILT 1.1 1.2* 1.4*   TP 4.8* 5.0* 4.8*         Lab Results   Component Value Date    PHOS 2.5 04/28/2025         Established Problem list:  Problem List[7]    ASSESSMENT/PLAN:  1.  VF arrest SP CV  Cardiology following    2. LISSETT in association with TLS, received dialysis on 4/21    3. Respiratory, intbuated post arrest  Vant per critical care    4. Possible sepsis in patient with neutropenia  -blood cultures sent and was stared on ivabx  Has PICC and temp HD cath, risk for seeding lines          Tayo Gonzales MD, MD [facility]  [phone]         [1]   Past Medical History:   Hx of lymphoma, non-Hodgkins    CUtaneous, b cell (venogopal)    Unspecified essential hypertension   [2]   Past Surgical History:  Procedure Laterality Date    Other surgical history  '12    [place] tooth    Other surgical history Right     lasik   [3]   Family History  Problem Relation Age of Onset    Hypertension Brother         half brother    Lipids Brother     Stroke Mother    [4]   Allergies  Allergen Reactions    Ibuprofen WHEEZING   [5]   Current Facility-Administered Medications:     norepinephrine (Levophed) 4 mg/250mL infusion premix, , ,     propofol (Diprivan) 10 MG/ML injection, , ,     acetaminophen (Tylenol) 160 MG/5ML oral liquid 650 mg, 650 mg, Per NG Tube, Q4H PRN **OR** acetaminophen (Tylenol) rectal  suppository 650 mg, 650 mg, Rectal, Q4H PRN **OR** acetaminophen (Ofirmev) 10 mg/mL infusion premix 1,000 mg, 1,000 mg, Intravenous, Q6H PRN    fentaNYL (Sublimaze) 50 mcg/mL injection 50 mcg, 50 mcg, Intravenous, Q30 Min PRN    fentaNYL (Sublimaze) 50 mcg BOLUS FROM BAG infusion, 50 mcg, Intravenous, Once PRN **AND** fentaNYL in sodium chloride 0.9% (Sublimaze) 1000 mcg/100mL infusion premix,  mcg/hr, Intravenous, Continuous PRN    fentaNYL (Sublimaze) 25 mcg BOLUS FROM BAG infusion, 25 mcg, Intravenous, Q30 Min PRN    senna (Senokot) 8.8 MG/5ML oral syrup 17.6 mg, 10 mL, Per NG Tube, BID    docusate (Colace) 50 MG/5ML oral solution 100 mg, 100 mg, Per NG Tube, BID    polyethylene glycol (PEG 3350) (Miralax) 17 g oral packet 17 g, 17 g, Per NG Tube, Daily PRN    bisacodyl (Dulcolax) 10 MG rectal suppository 10 mg, 10 mg, Rectal, Daily PRN    chlorhexidine gluconate (Peridex) 0.12 % oral solution 15 mL, 15 mL, Mouth/Throat, BID@0800,2000    mineral oil-white petrolatum (Artificial Tears) 83-15 % ophthalmic ointment 1 Application, 1 Application, Both Eyes, Nightly    propofol (Diprivan) 10 mg/mL infusion premix, 5-50 mcg/kg/min (Dosing Weight), Intravenous, Continuous    pantoprazole (Protonix) 40 mg in sodium chloride 0.9% PF 10 mL IV push, 40 mg, Intravenous, QAM AC    fentaNYL (Sublimaze) 50 mcg/mL injection, , ,     sodium chloride 0.9 % IV bolus 1,000 mL, 1,000 mL, Intravenous, Once    amiodarone in dextrose 4.14% (Cordarone) 360 mg/200mL infusion premix, 1 mg/min, Intravenous, Continuous **FOLLOWED BY** amiodarone in dextrose 4.14% (Cordarone) 360 mg/200mL infusion premix, 0.5 mg/min, Intravenous, Continuous    norepinephrine (Levophed) 4 mg/250mL infusion premix, 0.5-30 mcg/min, Intravenous, Continuous    ceFEPIme (Maxipime) 1 g in sodium chloride 0.9% 100mL IVPB-ABNER, 1 g, Intravenous, Q8H    filgrastim-aafi (Nivestym) 480 MCG/0.8ML prefilled syringe 480 mcg, 480 mcg, Subcutaneous, Daily@1600     acyclovir (Zovirax) tab 400 mg, 400 mg, Oral, BID    oxyCODONE (Roxicodone) 5 MG/5ML oral solution 5 mg, 5 mg, Oral, Q4H PRN    senna (Senokot) 8.8 MG/5ML oral syrup 8.8 mg, 5 mL, Oral, BID PRN    simethicone (Mylicon) chewable tab 125 mg, 125 mg, Oral, QID PRN    acetaminophen (Ofirmev) 10 mg/mL infusion premix 1,000 mg, 1,000 mg, Intravenous, Q8H PRN    allopurinol (Zyloprim) tab 300 mg, 300 mg, Oral, BID    prochlorperazine (Compazine) 10 MG/2ML injection 10 mg, 10 mg, Intravenous, Q6H PRN    melatonin tab 3 mg, 3 mg, Oral, Nightly PRN    ondansetron (Zofran) 4 MG/2ML injection 4 mg, 4 mg, Intravenous, Q6H PRN    [Held by provider] enoxaparin (Lovenox) 40 MG/0.4ML SUBQ injection 40 mg, 40 mg, Subcutaneous, Daily    polyethylene glycol (PEG 3350) (Miralax) 17 g oral packet 17 g, 17 g, Oral, Daily PRN    bisacodyl (Dulcolax) 10 MG rectal suppository 10 mg, 10 mg, Rectal, Daily PRN    fleet enema (Fleet) rectal enema 133 mL, 1 enema, Rectal, Once PRN  [6]   Current Facility-Administered Medications on File Prior to Encounter   Medication Dose Route Frequency Provider Last Rate Last Admin    [] midazolam (Versed) 2 MG/2ML injection 1 mg  1 mg Intravenous Q5 Min PRN Trav Scott MD   0.5 mg at 04/10/25 1527    [] fentaNYL (Sublimaze) 50 mcg/mL injection 50 mcg  50 mcg Intravenous Q5 Min PRN Trav Scott MD   25 mcg at 04/10/25 1525    [COMPLETED] sodium chloride 0.9 % IV bolus 1,000 mL  1,000 mL Intravenous Once Mak Mcallister DO   Stopped at 25 1506    [COMPLETED] iopamidol 76% (ISOVUE-370) injection for power injector  100 mL Intravenous ONCE PRN Mak Mcallister DO   100 mL at 25 1523    [] sodium chloride 0.9% infusion   Intravenous Continuous Mak Mcallister DO         Current Outpatient Medications on File Prior to Encounter   Medication Sig Dispense Refill    allopurinol 300 MG Oral Tab Take 1 tablet (300 mg total) by mouth daily. 15 tablet 0    HYDROcodone-acetaminophen 5-325 MG Oral Tab  Take 1 tablet by mouth every 6 (six) hours as needed for Pain. 20 tablet 0   [7]   Patient Active Problem List  Diagnosis    HTN (hypertension)    Hx of lymphoma, non-Hodgkins    Peritoneal carcinomatosis (HCC)    Non-Hodgkin lymphoma (HCC)    Cancer associated pain    LISSETT (acute kidney injury)    Metabolic acidosis    Hyponatremia    Mediastinal lymphadenopathy    Hypercalcemia    Troponin level elevated    Abdominal pain, generalized    Generalized abdominal pain    Diffuse large B cell lymphoma (HCC)    Admission for chemotherapy    At high risk of tumor lysis syndrome    Renal insufficiency    Hyperuricemia    Spontaneous tumor lysis syndrome (HCC)    Cancer related pain    Tumor lysis syndrome (HCC)    Hyperkalemia    Hyperphosphatemia    Hypocalcemia    Elevated PSA    Depression    Palliative care encounter    Situational mixed anxiety and depressive disorder    Nausea    ATN (acute tubular necrosis)

## 2025-04-28 NOTE — PROGRESS NOTES
Procedure: Endotracheal intubation    Indication: Cardiac arrest    Procedure: Patient was receiving CPR we did not pause CPR for intubation.  Patient was intubated with a 7.5 endotracheal tube using a glide scope a GBL 4 hyper angulated blade.  This occurred on first attempt balloon was inflated good bilateral breath sounds end-tidal CO2 reading in the 30s after intubation.    Chest x-ray was performed after there was return of spontaneous circulation to be been advanced during the CPR was sitting just above the ben that was pulled back a centimeter.    Repeat chest x-ray in the ICU was ordered    DOS 4/28/25

## 2025-04-28 NOTE — PROGRESS NOTES
[facility]  Nephrology Progress Note    Lencho Torres Attending:  Elliot Phoenix MD     Subjective:  Vfib arrest this morning. Now intubated and sedated    Physical Exam:   BP (!) 77/56   Pulse 105   Temp 99.7 °F (37.6 °C) (Oral)   Resp 18   Wt 173 lb 6.4 oz (78.7 kg)   SpO2 99%   BMI 23.52 kg/m²   Temp (24hrs), Av °F (37.2 °C), Min:97.8 °F (36.6 °C), Max:99.8 °F (37.7 °C)       Intake/Output Summary (Last 24 hours) at 2025 0932  Last data filed at 2025 0552  Gross per 24 hour   Intake --   Output 150 ml   Net -150 ml     Wt Readings from Last 3 Encounters:   25 173 lb 6.4 oz (78.7 kg)   25 166 lb 3.2 oz (75.4 kg)   25 178 lb 9.6 oz (81 kg)     General: intubated,  sedated  HEENT: No scleral icterus, MMM  Neck: Supple, no DULCE or thyromegaly  Cardiac: Regular rate and rhythm, S1, S2 normal  Lungs: Decreased BS at bases bilaterally   Abdomen: Soft, non-tender.   Extremities: Without clubbing, cyanosis; no edema  Neurologic: Sedated  Skin: Warm and dry, no rashes     Labs:   Lab Results   Component Value Date    WBC 0.2 2025    HGB 14.1 2025    HCT 40.3 2025    PLT 36.0 2025    CREATSERUM 1.80 2025    BUN 32 2025     2025    K 3.0 2025     2025    CO2 21.0 2025     2025    CA 6.6 2025    ALB 2.9 2025    ALKPHO 177 2025    BILT 1.9 2025    TP 4.5 2025     2025     2025    INR 1.38 2025    PTP 17.1 2025    MG 2.0 2025    PHOS 2.5 2025       Imaging:  All imaging studies reviewed.    Meds:   Current Hospital Medications[1]      Assessment and Plan:    1) LISSETT- ATN due to tumor lysis syndrome / marked hyperuricemia s/p HD x 1. Renal function and electrolytes are stable, follow renal function    2) Hyperkalemia / hyperphos- due to TLS; improved. Replete per protocol.     3) DLBCL with high tumor burden and spontaneous TLS  s/p R-CHOP 4/19.     4) TLS s/p rasburicase x 2, now on allopurinol    5) Vfib arrest: Vfib arrest followed by PEA arrest on 4/28, unclear etiology. Cardiology following    Thank you for allowing me to participate in this patient's care. Please feel free to call me with any questions or concerns.     Terese Yao MD  04/28/25       [1]   Current Facility-Administered Medications   Medication Dose Route Frequency    acetaminophen (Tylenol) 160 MG/5ML oral liquid 650 mg  650 mg Per NG Tube Q4H PRN    Or    acetaminophen (Tylenol) rectal suppository 650 mg  650 mg Rectal Q4H PRN    Or    acetaminophen (Ofirmev) 10 mg/mL infusion premix 1,000 mg  1,000 mg Intravenous Q6H PRN    fentaNYL (Sublimaze) 50 mcg/mL injection 50 mcg  50 mcg Intravenous Q30 Min PRN    fentaNYL in sodium chloride 0.9% (Sublimaze) 1000 mcg/100mL infusion premix   mcg/hr Intravenous Continuous PRN    fentaNYL (Sublimaze) 25 mcg BOLUS FROM BAG infusion  25 mcg Intravenous Q30 Min PRN    senna (Senokot) 8.8 MG/5ML oral syrup 17.6 mg  10 mL Per NG Tube BID    docusate (Colace) 50 MG/5ML oral solution 100 mg  100 mg Per NG Tube BID    polyethylene glycol (PEG 3350) (Miralax) 17 g oral packet 17 g  17 g Per NG Tube Daily PRN    bisacodyl (Dulcolax) 10 MG rectal suppository 10 mg  10 mg Rectal Daily PRN    chlorhexidine gluconate (Peridex) 0.12 % oral solution 15 mL  15 mL Mouth/Throat BID@0800,2000    mineral oil-white petrolatum (Artificial Tears) 83-15 % ophthalmic ointment 1 Application  1 Application Both Eyes Nightly    propofol (Diprivan) 10 mg/mL infusion premix  5-50 mcg/kg/min (Dosing Weight) Intravenous Continuous    pantoprazole (Protonix) 40 mg in sodium chloride 0.9% PF 10 mL IV push  40 mg Intravenous QAM AC    amiodarone in dextrose 4.14% (Cordarone) 360 mg/200mL infusion premix  1 mg/min Intravenous Continuous    Followed by    amiodarone in dextrose 4.14% (Cordarone) 360 mg/200mL infusion premix  0.5 mg/min Intravenous Continuous     norepinephrine (Levophed) 4 mg/250mL infusion premix  0.5-30 mcg/min Intravenous Continuous    ceFEPIme (Maxipime) 2 g in sodium chloride 0.9% 100mL IVPB-ABNER  2 g Intravenous 2 times per day    potassium phosphate dibasic 15 mmol in sodium chloride 0.9% 250 mL IVPB  15 mmol Intravenous Once    Followed by    potassium chloride 40 mEq in 250mL sodium chloride 0.9% IVPB premix  40 mEq Intravenous Once    filgrastim-aafi (Nivestym) 480 MCG/0.8ML prefilled syringe 480 mcg  480 mcg Subcutaneous Daily@1600    acyclovir (Zovirax) tab 400 mg  400 mg Oral BID    oxyCODONE (Roxicodone) 5 MG/5ML oral solution 5 mg  5 mg Oral Q4H PRN    senna (Senokot) 8.8 MG/5ML oral syrup 8.8 mg  5 mL Oral BID PRN    simethicone (Mylicon) chewable tab 125 mg  125 mg Oral QID PRN    allopurinol (Zyloprim) tab 300 mg  300 mg Oral BID    prochlorperazine (Compazine) 10 MG/2ML injection 10 mg  10 mg Intravenous Q6H PRN    melatonin tab 3 mg  3 mg Oral Nightly PRN    ondansetron (Zofran) 4 MG/2ML injection 4 mg  4 mg Intravenous Q6H PRN    [Held by provider] enoxaparin (Lovenox) 40 MG/0.4ML SUBQ injection 40 mg  40 mg Subcutaneous Daily    fleet enema (Fleet) rectal enema 133 mL  1 enema Rectal Once PRN

## 2025-04-28 NOTE — PROGRESS NOTES
Assessment and Objective  \"Progressing\"  INTERVENTIONS:  - Assess for changes in respiratory status  - Assess for changes in mentation and behavior  - Position to facilitate oxygenation and minimize respiratory effort  - Oxygen supplementation based on oxygen saturation or ABGs  - Provide Smoking Cessation handout, if applicable  - Encourage broncho-pulmonary hygiene including cough, deep breathe, Incentive Spirometry  - Assess the need for suctioning and perform as needed  - Assess and instruct to report SOB or any respiratory difficulty  - Respiratory Therapy support as indicated  - Manage/alleviate anxiety  - Monitor for signs/symptoms of CO2 retention    Most Recent ABG (if any available):    Recent Labs   Lab 04/28/25  0739   ABGPHT 7.38   IGWHFV1D 29*   ZRNDT5U 61*   ABGHCO3 19.4*   ABGBE -6.8*   TEMP 98.6   ARELIS Not Applicable   SITE Arterial Line   DEV    THGB 11.5*       Ventilator/CPAP/Bipap Machine settings (if in use):   -   04/28/25 1010   Vent Information   $ RT Standby Charge (per 15 min) 1   Vent Initiation Date 04/28/25   Vent Initiation Time 0710   Ventilation Day(s) 1   Interface Invasive   Vent Type    Vent plugged into main power? Yes   Vent ID [id number]   Vent Mode VC+   Settings   FiO2 (%) 80 %   Resp Rate (Set) 22   Vt (Set, mL) 500 mL   Waveform Decelerating ramp   PEEP/CPAP (cm H2O) 8 cm H20   Insp Time (sec) 0.9 sec   Trigger Sensitivity Flow (L/min) 3 L/min   Humidification Heater   H2O Bag Level 3/4 Full   Heater Temperature 98.6 °F (37 °C)   Readings   Total RR 21   Minute Ventilation (L/min) 12.9 L/min   Inspiratory Tidal Volume 500 mL   Expiratory Tidal Volume 501 mL   PIP Observed (cm H2O) 21 cm H2O   MAP (cm H2O) 12   I/E Ratio 1:2.4   Alarms   High RR 40   Insp Pressure High (cm H2O) 40 cm H2O   Insp Pressure Low (cm H2O) 9 cm H2O   MV High (L/min) 20 L/min   MV Low (L/min) 3 L/min   [REMOVED] ETT   Removal Date/Time: 04/28/25 1100  Placement Date/Time: 04/28/25 0645   Airway  Size: 7.5 mm  Cuffed: Cuffed  Insertion attempts: 1  Technique: Direct laryngoscopy  Placement Verification: Capnometry  Placed By: ICU physician   Secured at (cm) 26 cm   Suctioned? N   Measured From Lips   Secured Location Center   Secured by Commercial tube schaffer   Site Condition Dry   Req'd equipment at bedside Bag mask       Notes:   -Patient intubated this morning, peep was adjusted multiple times to accommodate hypoxia, patient was extubated per MD  Plans:   -Continue to monitor.

## 2025-04-28 NOTE — BRIEF PROCEDURE NOTE
Procedure: Arterial line placement    Indication: Cardiac arrest    The left radial artery was prepped and draped in a sterile fashion.  Using ultrasound guidance the left radial artery was identified a catheter was inserted into the radial artery a guidewire was advanced.  Catheter was then inserted into the radial artery without complication.    Pulsatile blood flow returned this was connected to our arterial transducer and leveled.    A sterile dressing was applied patient tolerated the procedure well with no immediate complications    Date of service was April 28, 2025

## 2025-04-28 NOTE — SPIRITUAL CARE NOTE
Spiritual Care Visit Note    Patient Name: Lencho Torres Date of Spiritual Care Visit: 25   : 1955 Primary Dx: <principal problem not specified>       Referred By: Referral From: Nurse    Spiritual Care Taxonomy:    Intended Effects: Establish rapport and connectedness    Methods: Encourage sharing of feelings, Encourage story-telling, Offer support    Interventions: Acknowledge current situation, Active listening, Explain  role, Identify supportive relationship(s), Share words of hope and inspiration, Silent prayer    Visit Type/Summary:      responded to the consult \"grief support.\" Checked in with the RN. Lencho appeared to be sleeping. Listened compassionately to Lecnho's story through his neighbors. Identified that Lencho is very connected to his neighbors and their children. Acknowledged his condition. Explored their feelings.  Processed their feelings. Explored their supportive relationship/spiritual resources. They expressed appreciation for listening presence and support.   Also, provided Spiritual Care card with  contact information/resources.     Spiritual Care support can be requested via an Epic consult.   For urgent/immediate needs, please contact the On Call  at: [place]: ext [phone]    Rev. Pato Camacho M.Div., M.T.S.,   Certified Grief Counseling Specialist  Advanced Practice Board Certified

## 2025-04-28 NOTE — PROGRESS NOTES
I spoke with the patient's surrogate and power of  Susan.  She brought in the paperwork from the patient's house that demonstrated the patient had pre-existing wishes that he did not want to suffer at the end of his life did not want to be kept alive by machines.  He had recently reached out to her and he had even told the oncology APN that he did not think he was leaving the hospital suffer needlessly.  He had mentioned that he had thought that R-CHOP might even be \"useless\" because he did not think he was leaving.    I discussed with Dr. Chacon that the patient had favorable neurological prognostication after an arrest because he was in hospital it was witnessed short downtime and it was V-fib.  He expressed that the patient did have favorable oncological prognostication given the type of cancer and response that he had.  However given the patient's prior wishes and concerns the decision has been made at this time that the family would like to pursue comfort measures.  We did provide them the options of waiting and providing neurological prognostication at 72 hours and seeing if the patient could improve.  We stated that there was a possibility the patient could regain consciousness although we would not know this and it would be impossible to predict if he would wake up and what type of condition he would be in but that the probability was not 0.    She said he was very clear that he did not want to suffer he did not want any further treatment at this point and he wanted to be made comfortable at the end of his life if she wanted to pursue comfort measures today.  We did contact Juventino's brother who lives in [place] to make him aware.    Understands that the patient may not survive even for him to fly from [place] but he is flying in from [place] now.  Susan would like to go ahead and make him comfortable and not prolong any further suffering and allow him to pass naturally.  I explained that I  anticipated if we extubated him today he would pass today.    Date of service was April 28, 2025

## 2025-04-28 NOTE — PLAN OF CARE
Patient is alert and oriented x 4, room air, abdomen rounded, bowel sounds present, voiding adequate amounts, prn oxy with positive results, zofran controlling nausea and vomiting, PICC line saline locked.

## 2025-04-28 NOTE — CONSULTS
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    The patient had apparently been doing well.  He that he had been up ambulating in the hospital had improvement in his renal function the creatinine downtrending from a peak of 2.3 on the 24th down to 1.6.  His LFTs had relatively normalized his phosphorus had come down from 9.3 down to 2.5 his uric acid had been 9 and his LDH had been greater than 4500, down trended.    Earlier this morning the patient actually got a weight around 6:00 was alert and then essential CTC called the nurse the patient appeared to go into ventricular fibrillation.  Came in the room CODE BLUE was called patient was unresponsive and CPR was started.  Please see my note patient received CPR with multiple rounds of defibrillation intubation epinephrine amiodarone magnesium and lidocaine.    When the patient arrived in the intensive care unit he was posturing.  He was tachypneic he was hypoxic he was breathing markedly over the ventilator.  He appeared to have significant pulmonary edema on lung examination showing diffuse bilateral B-lines.  The RV appeared normal in size it was minimally hypokinetic.  The RV LV appeared somewhat dysfunctional.    We did increase the PEEP to initially 14 without any improvement in oxygenation and in fact slight worsening of the oxygenation.  We then reduced the PEEP and reduce the time the patient sat started to improve likely consistent with slow recruitment.  I also provided paralysis to the patient with rocuronium sedation with fentanyl and propofol as the patient was very dyssynchronous breathing 40 times per minute on mechanical ventilation.    Labs were currently  pending.    Echocardiogram is being performed.    EKG was reviewed by myself.  This EKG demonstrated a normal QTc, of 440, sinus tachycardia.  HI interval was 130 ms, there was some signs of left atrial enlargement otherwise relatively nonspecific no acute ST elevation was demonstrated.    In summary this is a 69-year-old male that presents was postcardiac arrest from ventricular fibrillation in the background setting of spontaneous tumor lysis syndrome from follicular lymphoma transformed to diffuse large B-cell lymphoma.    Problems  Cardiac arrest  Possible anoxic encephalopathy  Hypoxic respiratory failure  Ventricular fibrillation  History of tumor lysis syndrome  Follicular lymphoma transformed to diffuse large B-cell lymphoma  Acute kidney injury with acute renal failure and acute tubular necrosis probably secondary to underlying TLS.  Creatinine has started to improve  Hyperphosphatemia improving  Neutropenia  Status postchemotherapy with R-CHOP    Plan  Neuro  Cardiac arrest  Will try to keep him from developing fever and avoid hyperthermia  Goal temperature should be less than 37.5 degrees    Stat CT head as the patient was posturing prior to paralysis.  He was unresponsive his pupils were equal but he was posturing on exam.  This could be post cardiac arrest we will follow-up when he is hemodynamically stable to build to go for head CT has had chest x-ray has had improvement in oxygenation and echocardiogram.    Neuro prognostication typically occurs at 72 hours    EEG to rule out seizures will be ordered    Neurology consult has been placed    Cardiovascular  Shock  Remains on Levophed  Stat echo has been ordered  Did not see marked RV dysfunction on the echocardiogram making PE much less likely.  PE is still possible though unlikely, he is getting an echo lower ext Doppler and possible a ct chest plus or minus contrast for PE.    We can use the central catheter that he has in his right IJ if needed for  vascular access at this time.    Respiratory  Acute hypoxic respiratory failure  I believe this is secondary to pulmonary edema he had significant hypoxia and pulmonary edema with pink frothy secretions coming from his endotracheal tube  Will increase his PEEP  Decrease his eye time his respiratory rate is currently 22 his pH is normal at 7.34.  His CO2 is currently 29.    GI  His abdomen was soft nontender nondistended.  His AST and his ALT are now normalized  His bilirubin was mildly elevated at 1.4 post chemo we will continue to trend  Follow-up CT of the abdomen and pelvis      Renal    Acute kidney injury secondary to tumor lysis syndrome seems to be improving phosphorus, potassium, LDH had all near normalized after the patient received R-CHOP.  Will continue to follow    Place Crowley catheter  Nephrology is following along    Infectious disease  ID will be consulted recently received R-CHOP  Is neutropenic  Is going to receive cefepime  Blood cultures have been ordered  Atypical infections would be much less likely given his R-CHOP was given in the last 10 days.  Will follow their recommendations  Follow-up CT scan findings    Endocrine  Patient's blood sugars have been stable we will continue to follow  If he remains on Levophed over 10 and vasopressin will add stress dose steroids of hydrocortisone 50 IV every 6    Some studies have used steroids postcardiac arrest although this data remains somewhat murky.    Hematology  Diffuse large B-cell lymphoma transformed from follicular lymphoma  Neutropenia  Status post R-CHOP    Transfuse to keep hemoglobin greater than 7  He remains on Andres gastrum per hematology recommendations  He remains on prophylactic allopurinol  He remains on prophylactic acyclovir    Prophylaxis for the vent he remains on Peridex solution, Protonix  I have held off on anticoagulation with Lovenox for the moment because his platelet count is 35,000.    I expect that he will get  pancytopenic as he is \"chemotherapy    Keep lines and drains  Endotracheal tube, dialysis catheter, PICC line, left radial arterial line, Crowley catheter, we will also place an OG tube for feedings.    Tube feedings will be started after the CT scan is verified no intra-abdominal catastrophe    Date of service is April 28, 2025.    Upon my evaluation, this patient had a high probability of imminent or life-threatening deterioration due to hypoxemia, shock, respiratory failure, and sepsis, which required my direct attention, intervention, and personal management.    I have personally provided 41 minutes of critical care time, exclusive of time spent on separately billable procedures.  Time includes review of all pertinent laboratory/radiology results, discussion with consultants, and monitoring for potential decompensation.  Performed interventions included fluids, pressor drugs, oxygen, and managing mechanical ventilation.

## 2025-04-28 NOTE — PROGRESS NOTES
Hematology/Oncology Progress Note    Patient Name: Lencho Torres  Medical Record Number: [id number]    YOB: 1955     Reason for Consultation:  Lencho Torres was seen today for the diagnosis of DLBCL    Interval events:      - pt had Vfib arrest this AM s/p ROSC. Nursing noted he walked to bathroom himself 30 mins before arrest without issue  - started on GCSF yesterday  - ANC remains low 0.02, plt 36k  - pt ordered for CT head/C/A/P    Inpatient Meds:  Scheduled Medications[1]  Medication Infusions[2]    PRN Meds:  PRN Medications[3]    Allergies:   Allergies[4]    Vital Signs:  Height: --  Weight: 78.7 kg (173 lb 6.4 oz) (04/28 0552)  BSA (Calculated - sq m): --  Pulse: 105 (04/28 0547)  BP: 77/56 (04/28 0708)  Temp: 99.7 °F (37.6 °C) (04/28 0547)  Do Not Use - Resp Rate: --  SpO2: 99 % (04/28 0547)    Wt Readings from Last 6 Encounters:   04/28/25 78.7 kg (173 lb 6.4 oz)   04/17/25 75.4 kg (166 lb 3.2 oz)   04/09/25 81 kg (178 lb 9.6 oz)   04/08/25 80 kg (176 lb 6.4 oz)   09/01/22 79 kg (174 lb 3.2 oz)   02/07/22 83 kg (183 lb)       Physical Examination:  General: Patient undergoing active chest compressions at time of being seen    Laboratory:  Recent Labs   Lab 04/22/25  1119 04/27/25  0611 04/28/25  0549   WBC 5.1 0.2* 0.2*   HGB 12.5* 12.9* 12.1*   HCT 36.4* 37.0* 35.1*   PLT 84.0* 35.0* 36.0*   MCV 89.4 88.1 87.8   RDW 11.8 11.9 11.8   NEPRELIM 4.84 0.04* 0.02*       Recent Labs   Lab 04/26/25  0545 04/27/25  0611 04/28/25  0549 04/28/25  0747    143 142 141   K 3.4*  3.4* 3.4* 3.2* 3.0*    108 107 105   CO2 24.0 26.0 26.0 21.0   BUN 45* 38* 28* 32*   CREATSERUM 2.15* 1.94* 1.61* 1.80*   * 102* 102* 234*   CA 6.7* 7.0* 7.0* 6.6*   PHOS 4.5 3.6 2.5  --    TP 4.8* 5.0* 4.8* 4.5*   ALB 3.1* 3.2 3.1* 2.9*   ALKPHO 149* 137* 123* 177*   AST 99* 30 21 212*   ALT 79* 50* 37 148*   BILT 1.1 1.2* 1.4* 1.9*       Recent Labs   Lab 04/28/25  0747   INR 1.38*       Impression & Plan:     V  fib arrest  - unclear etiology. TLS has been improving. Electrolytes ok yesterday.  - cardiology consulted, workup in progress    DLBCL, transformed from FL  TLS  - high tumor burden c/b spontaneous TLS. FISH 4/10 for myc/bcl-2/bcl-6 pending  - PET/CT with widespread disease  - s/p C1 R-CHOP 4/19  - continue renvela  - ppx :acyclovir 400mg BID  - transfuse for hgb <7, plt <50k. Will increase platelet threshold to 50k given his arrest, chest compressions and high likelihood of bleeding from his chest compressions    Neutropenia  - secondary to chemo  - continue GCSF 5mcg/kg daily until ANC >1000x2  - no fever. Empiric cefepime given arrest    Will continue to follow    Addendum: d/w ICU. Pt's POA transitioned pt to comfort care as pt had discussed his wishes with his POA. Updated pt's primary oncologist Dr Taylor. Terminal extubation. Will sign off.    Moises Chacon MD  [facility] Hematology Oncology             [1]    senna  10 mL Per NG Tube BID    docusate  100 mg Per NG Tube BID    chlorhexidine gluconate  15 mL Mouth/Throat BID@0800,2000    mineral oil-white petrolatum  1 Application Both Eyes Nightly    pantoprazole  40 mg Intravenous QAM AC    sodium chloride  1,000 mL Intravenous Once    cefepime  2 g Intravenous 2 times per day    filgrastim-aafi  480 mcg Subcutaneous Daily@1600    acyclovir  400 mg Oral BID    allopurinol  300 mg Oral BID    [Held by provider] enoxaparin  40 mg Subcutaneous Daily   [2]    fentanyl      propofol 25 mcg/kg/min (04/28/25 0742)    amiodarone      Followed by    amiodarone      norepinephrine 20 mcg/min (04/28/25 0742)   [3]   acetaminophen **OR** acetaminophen **OR** acetaminophen    fentaNYL    fentaNYL (Sublimaze) **AND** fentanyl    fentaNYL (Sublimaze)    polyethylene glycol (PEG 3350)    bisacodyl    oxyCODONE    senna    simethicone    prochlorperazine    melatonin    ondansetron    fleet enema  [4]   Allergies  Allergen Reactions    Ibuprofen WHEEZING

## 2025-04-28 NOTE — BRIEF PROCEDURE NOTE
CODE BLUE note    Arrived to the patient's room patient in distress.  Hypoxic respiratory failure getting CPR.  Rhythm was V-fib initially charged and shocked.  I then performed endotracheal intubation with a 7.52 by the first attempt.  End-tidal CO2 was reading 35.  Continued with ongoing resuscitative measures with both epinephrine and CPR.  Multiple rounds of defibrillation were completed with return to sinus rhythm.  Patient degenerated back into ventricular fibrillation multiple times.  Ultimately got shocked multiple times received 300 mg of IV amiodarone also received 100 mg of lidocaine as well as 2 g of magnesium.    Ultimately after approximately 20 minutes of CPR the patient had return of spontaneous circulation.    Patient arrived to the intensive care unit in critical condition on norepinephrine hypoxic end-tidal CO2 reading 26 with stat labs and arterial blood gas pending.    Date of service was April 28, 2025

## 2025-04-28 NOTE — PLAN OF CARE
Reviewed ICU documentation with plan for hospice. We will sign off from a cardiology perspective. Please let us know if we can assist further.

## 2025-04-28 NOTE — SPIRITUAL CARE NOTE
Spiritual Care Visit Note    Patient Name: Lencho Torres Date of Spiritual Care Visit: 25   : 1955 Primary Dx: <principal problem not specified>       Referred By: Referral From: Other (Comment) (On Call Phone/ Overhead)    Spiritual Care Taxonomy:    Intended Effects: Convey a calming presence    Methods: Offer spiritual/Episcopal support, Offer support    Interventions: Acknowledge current situation, Active listening, Ask guided questions, Discuss concerns    Visit Type/Summary:     - Spiritual Care: Consulted with RN prior to visit.  responded to CODE BLUE in Med Onc unit. Nurse confirmed no family at the bedside.  provided a peaceful presence during the code.  offered emotional support to staff members who expressed concern during code.  checked in with NP who called family.  advised nurse to contact spiritual care if further family support is needed.  remains available as needed for follow up.    Spiritual Care support can be requested via an Epic consult. For urgent/immediate needs, please contact the On Call  at: Edward: ext [phone]    Min. Navya Guardado Mdiv.

## 2025-04-28 NOTE — CONSULTS
Cardiology Consultation    Lencho Torres Patient Status:  Inpatient    1955 MRN [id number]   Location [facility] 4NW-A Attending Elliot Phoenix MD   Hosp Day # 10 PCP Shayan Harris MD     Reason for Consultation:  Code Blue      History of Present Illness:  Lencho Torres is a a(n) 69 year old male. Responded to Code Blue.  H/o lymphoma, admitted 25 for chemotherapy.  Developed ARF, bur cr improving.  Also neutropenic.  Had been doing well and planning dc today, ambulated to bathroom 30 minutes prior to code blue.  Initial rhythm was VF  CPR, intubated.  Multiple shocks, IV amio, epi's.  Return of rhythm w/o pulse initially.   Bicarb, magnesium given, further shocks.  Ultimately ROSC obtained.  Has been stable for the past 20 minutes.  Bedside echo with preserved EF and RV grossly normal.    History:  Past Medical History[1]  Past Surgical History[2]  Family History[3]      Allergies:  Allergies[4]    Medications:  Scheduled Medications[5]    Continuous Infusions:  Medication Infusions[6]    Social History:   reports that he quit smoking about 40 years ago. His smoking use included cigarettes. He started smoking about 62 years ago. He has a 22 pack-year smoking history. He has never used smokeless tobacco. He reports current alcohol use. He reports that he does not use drugs.    Review of Systems:  All systems were reviewed and are negative except as described above in HPI.    Physical Exam:      Temp:  [97.8 °F (36.6 °C)-99.8 °F (37.7 °C)] 99.7 °F (37.6 °C)  Pulse:  [] 105  Resp:  [16-20] 18  BP: (138-158)/(73-90) 143/73  SpO2:  [95 %-100 %] 99 %    Last 3 Weights   25 0552 173 lb 6.4 oz (78.7 kg)   25 0500 176 lb 11.2 oz (80.2 kg)   25 0552 179 lb 14.4 oz (81.6 kg)   25 0440 169 lb 9.6 oz (76.9 kg)   25 0500 168 lb 3.2 oz (76.3 kg)   25 0700 164 lb 7.4 oz (74.6 kg)   25 1341 147 lb 3.2 oz (66.8 kg)   25 1000 166 lb 3.6 oz (75.4 kg)   25 1314  166 lb 3.2 oz (75.4 kg)   04/09/25 1915 178 lb 9.6 oz (81 kg)   04/09/25 1731 176 lb (79.8 kg)   04/09/25 1303 176 lb (79.8 kg)           General: No apparent distress.  intubated  HEENT: No focal deficits.  Neck: supple. JVP normal  Cardiac: Regular rhythm, S1, S2 normal,   No rub or gallop.  No murmur.  Lungs: CTA  Abdomen: Soft, non-tender.   Extremities: No edema  Neurologic: no focal deficits  Skin: Warm and dry.          Telemetry: reviewed    Laboratories and Data:  Diagnostics:    EKG, 4/28/2025:  pending    CXR, 4/28/2025:  pending    Labs:   HEM:  Recent Labs   Lab 04/22/25  1119 04/27/25  0611   WBC 5.1 0.2*   HGB 12.5* 12.9*   PLT 84.0* 35.0*       Chem:  Recent Labs   Lab 04/23/25  0550 04/24/25  0550 04/25/25  0500 04/26/25  0545 04/27/25  0611    140 145 145 143   K 3.9 3.6 3.4*  3.4* 3.4*  3.4* 3.4*    106 112 109 108   CO2 24.0 22.0 20.0* 24.0 26.0   BUN 58* 56* 46* 45* 38*   CREATSERUM 1.90* 2.32* 2.12* 2.15* 1.94*   CA 6.6* 6.7* 6.1* 6.7* 7.0*   MG 2.4 2.1  --   --   --    PHOS 9.3* 7.8* 5.2* 4.5 3.6   * 109* 93 109* 102*       Recent Labs   Lab 04/22/25  0317 04/25/25  0500 04/26/25  0545 04/27/25  0611   ALT 50* 37 79* 50*   * 42* 99* 30   ALB 3.3 2.8* 3.1* 3.2       No results for input(s): \"PTP\", \"INR\" in the last 168 hours.    No results for input(s): \"TROP\", \"CK\" in the last 168 hours.      Impression:   Cardiac arrest - V fib initial rhythm, subsequent PEA during code.  Now with stable BP and rhythm at this point.  Labs this am with potassium of 3.4, stable creatinine.  RV looks ok, so doubt sign PE, but high risk.  B cell lymphoma, admitted for chemo 4/18.  ARF, stable.  Followed by nephrology.  Neutropenia - due to chemo.    Plan:  To ICU.  Await CXR, EKG.  Amiodarone drip.  Formal echo this am.  Platelets decreasing, so would hold empiric IV heparin.    Addendum:  Echo reviewed.  RV is definitely bigger than last echo and hypokinetic, so PE must be  entertained.  Also what was described as mitral annular calcification on last echo may in fact be a mass, though recent PET did not comment on metabolic uptake in the heart.  Discussed at length with intensivist.    Mookie Solis MD  4/28/2025  6:49 AM  Cr care 45 min         [1]   Past Medical History:   Hx of lymphoma, non-Hodgkins    CUtaneous, b cell (venogopal)    Unspecified essential hypertension   [2]   Past Surgical History:  Procedure Laterality Date    Other surgical history  '12    [place] tooth    Other surgical history Right     lasik   [3]   Family History  Problem Relation Age of Onset    Hypertension Brother         half brother    Lipids Brother     Stroke Mother    [4]   Allergies  Allergen Reactions    Ibuprofen WHEEZING   [5]    filgrastim-aafi  480 mcg Subcutaneous Daily@1600    acyclovir  400 mg Oral BID    allopurinol  300 mg Oral BID    [Held by provider] enoxaparin  40 mg Subcutaneous Daily   [6]

## 2025-04-28 NOTE — DIETARY NOTE
Clinical Nutrition  Inpatient Comfort Care Order Set noted. Nutrition services will sign off at this time. Re-consult RD if further nutrition care is needed.  Basilia Meza RD, LDN, [facility]  Clinical Dietitian  Spectra: [phone]

## 2025-04-28 NOTE — PHYSICAL THERAPY NOTE
PT following pt. However, pt now with end of life comfort care ordered. PT will sign off. Please re-order if GOC/medical status change.

## 2025-04-29 PROBLEM — I46.9 CARDIAC ARREST (HCC): Status: ACTIVE | Noted: 2025-01-01

## 2025-04-29 NOTE — PLAN OF CARE
Received pt resting in bed, comfort measures in place. Resting comfortably originally on fentanyl gtt, transitioned to morphine, see MAR. Morphine pushes given as well PRN. Pt noted to have mild oral secretions, robinul given PRN, see MAR. One episode of slight emesis noted. Family members at bedside, updated and participating in plan of care.

## 2025-04-29 NOTE — SPIRITUAL CARE NOTE
Spiritual Care Visit Note    Patient Name: Lencho Torres Date of Spiritual Care Visit: 25   : 1955 Primary Dx: <principal problem not specified>       Referred By: Referral From: Nurse    Spiritual Care Taxonomy:    Intended Effects: Build relationship of care and support    Methods: Offer support    Interventions: Active listening, Ask guided questions, Explain  role    Visit Type/Summary:     - Spiritual Care: Consulted with RN prior to visit.  checked in with charge nurse during rounding and received referral to check in for possible family support. Nurse at bedside introduced  to POAH and neighbors.  offered Comfort Basket to them, they declined. Nurse advised that patient brother is in route and there may be a need for further spiritual support once he arrives.  gave nurse extension to call if further support is needed.  remains available as needed for follow up.    Spiritual Care support can be requested via an Epic consult. For urgent/immediate needs, please contact the On Call  at: Edward: ext [phone]    Min. Navya Guardado Mdiv.

## 2025-04-29 NOTE — PLAN OF CARE
Patient transitioned to comfort care measures yesterday. Nephrology will sign-off. Thank you for allowing me to participate in this patient's care. Please feel free to call me with any questions or concerns.     Terese Yao MD

## 2025-04-29 NOTE — OCCUPATIONAL THERAPY NOTE
OT following pt. However, pt now with end of life comfort care ordered. OT will sign off. Please re-order if GOC/medical status change

## 2025-04-29 NOTE — PROGRESS NOTES
[facility]   part of [facility]     Hospitalist Progress Note     Lencho Torres Patient Status:  Inpatient    1955 MRN [id number]   [facility] 4NW-A Attending Elilot Phoenix MD   Hosp Day # 11 PCP Shayan Harris MD     Chief Complaint: abd pain     Subjective:     Patient developed cardiac arrest/PEA arrest.  Transferred to ICU    Objective:    Review of Systems:   Unable to complete    Vital signs:  Temp:  [99.3 °F (37.4 °C)-101.2 °F (38.4 °C)] 99.3 °F (37.4 °C)  Pulse:  [107-117] 107  Resp:  [13-20] 13  BP: (82-89)/(52-58) 89/58  SpO2:  [80 %-87 %] 87 %  AO: (113-122)/(58-62) 116/58    Physical Exam:    General: intubated/sedated  Respiratory: Clear to auscultation bilaterally  Cardiovascular: S1, S2.  Abdomen: Soft  Neuro: No new focal deficits      Diagnostic Data:    Labs:  Recent Labs   Lab 25  0611 25  0549 25  0747   WBC 0.2* 0.2* 0.2*   HGB 12.9* 12.1* 14.1   MCV 88.1 87.8 88.0   PLT 35.0* 36.0* 36.0*   INR  --   --  1.38*       Recent Labs   Lab 25  0611 25  0549 25  0747   * 102* 234*   BUN 38* 28* 32*   CREATSERUM 1.94* 1.61* 1.80*   CA 7.0* 7.0* 6.6*   ALB 3.2 3.1* 2.9*    142 141   K 3.4* 3.2* 3.0*    107 105   CO2 26.0 26.0 21.0   ALKPHO 137* 123* 177*   AST 30 21 212*   ALT 50* 37 148*   BILT 1.2* 1.4* 1.9*   TP 5.0* 4.8* 4.5*       Estimated Glomerular Filtration Rate: 40 mL/min/1.73m2 (A) (result from lab).     Recent Labs   Lab 25  0747   TROPHS 1,136*       Recent Labs   Lab 25  0747   PTP 17.1*   INR 1.38*              Imaging: Imaging data reviewed in Epic.    Medications: Scheduled Medications[1]    Assessment & Plan:     #Diffuse large B cell lymphoma  -acyclovir, allopurinol  -MRI brain shows no metastatic disease, high FLAIR signal abnormality in left frontal lobe  -S/p LP on  prior to starting chemo  -CSF final results pending  -Started cycle 1 R-CHOP chemotherapy on   -s/p  rituximab    #Chemo induced pancytopenia  GSCSF daily and monitor     #Tumor lysis syndrome  -S/p rasburicase on 4/18 and 4/19  -Allopurinol  -temporary catheter placed for HD 4/21 > s/p HD 4/21  No further HD needed     #LISSETT, d/t tumor lysis syndrome/hypercalcemia   Cr improving  Volume status stable  No further fluids/diuretics for now    #BLLE edema      #Cancer-related pain     # Situational anxiety, depression    #Hypokalemia  Replace and monitor    Dispo: as above.  CXR independently reviewed: mild pulm edema.  Tranferred to ICU 2/2 PEA arrest.  Discussed with family.  Pt wishes are to not have aggressive interventions.  Plan to transition to comfort care.      Upon my evaluation, this patient had a high probability of imminent or life-threatening deterioration due to shock, arrhythmia , and cardiac arrest, which required my direct attention, intervention, and personal management.    I have personally provided 41 minutes of critical care time, exclusive of time spent on separately billable procedures.  Time includes review of all pertinent laboratory/radiology results, discussion with consultants, and monitoring for potential decompensation.  Performed interventions included pressor drugs, oxygen, and intubation.        Elliot Phoenix MD    Supplementary Documentation:     Quality:    DVT Mechanical Prophylaxis:   SCDs,    DVT Pharmacologic Prophylaxis   Medication   None                Code Status: DNAR/Comfort Care  Crowley: No urinary catheter in place  Crowley Duration (in days):   Central line:    KAY:       Discharge is dependent on: clinical stability  At this point Mr. Torres is expected to be discharge to: home    The 21st Century Cures Act makes medical notes like these available to patients in the interest of transparency. Please be advised this is a medical document. Medical documents are intended to carry relevant information, facts as evident, and the clinical opinion of the practitioner. The medical  note is intended as peer to peer communication and may appear blunt or direct. It is written in medical language and may contain abbreviations or verbiage that are unfamiliar.                       [1]    scopolamine  1 patch Transdermal Q72H

## 2025-04-29 NOTE — PLAN OF CARE
Received patient on comfort care, unresponsive, POA and neighbors at the bedside. Patient is on fentanyl iv drip and ativan prn, looks comfortable.   His brother Kalyan and sister in law arrived from [place], both will stay at bedside, the POA Susan and 2 neighbors went home.    Problem: PAIN - ADULT  Goal: Verbalizes/displays adequate comfort level or patient's stated pain goal  Description: INTERVENTIONS:- Encourage pt to monitor pain and request assistance- Assess pain using appropriate pain scale- Administer analgesics based on type and severity of pain and evaluate response- Implement non-pharmacological measures as appropriate and evaluate response- Consider cultural and social influences on pain and pain management- Manage/alleviate anxiety- Utilize distraction and/or relaxation techniques- Monitor for opioid side effects- Notify MD/LIP if interventions unsuccessful or patient reports new pain- Anticipate increased pain with activity and pre-medicate as appropriate  Outcome: Progressing     Problem: DEATH & DYING  Goal: Pt/Family communicate acceptance of impending death and feel psychological comfort and peace  Description: INTERVENTIONS:- Assess patient/family anxiety and grief process related to end of life issues- Provide emotional and spiritual support- Provide information about the patient’s health status with consideration of family and cultural values- Communicate willingness to discuss death and facilitate grief process  with patient/family as appropriate- Emphasize sustaining relationships within family system and community, or julia/spiritual traditions- Initiate Spiritual Care consult as needed  Outcome: Progressing

## 2025-04-30 NOTE — PLAN OF CARE
Problem: GASTROINTESTINAL - ADULT  Goal: Maintains adequate nutritional intake (undernourished)  Description: INTERVENTIONS:- Monitor percentage of each meal consumed- Identify factors contributing to decreased intake, treat as appropriate- Assist with meals as needed- Monitor I&O, WT and lab values- Obtain nutritional consult as needed- Optimize oral hygiene and moisture- Encourage food from home; allow for food preferences- Enhance eating environment  Outcome: Progressing     Problem: METABOLIC/FLUID AND ELECTROLYTES - ADULT  Goal: Electrolytes maintained within normal limits  Description: INTERVENTIONS:- Monitor labs and rhythm and assess patient for signs and symptoms of electrolyte imbalances- Administer electrolyte replacement as ordered- Monitor response to electrolyte replacements, including rhythm and repeat lab results as appropriate- Fluid restriction as ordered- Instruct patient on fluid and nutrition restrictions as appropriate  Outcome: Progressing  Goal: Hemodynamic stability and optimal renal function maintained  Description: INTERVENTIONS:- Monitor labs and assess for signs and symptoms of volume excess or deficit- Monitor intake, output and patient weight- Monitor urine specific gravity, serum osmolarity and serum sodium as indicated or ordered- Monitor response to interventions for patient's volume status, including labs, urine output, blood pressure (other measures as available)- Encourage oral intake as appropriate- Instruct patient on fluid and nutrition restrictions as appropriate  Outcome: Progressing     Problem: SKIN/TISSUE INTEGRITY - ADULT  Goal: Incision(s), wounds(s) or drain site(s) healing without S/S of infection  Description: INTERVENTIONS:- Assess and document risk factors for pressure ulcer development- Assess and document skin integrity- Assess and document dressing/incision, wound bed, drain sites and surrounding tissue- Implement wound care per orders- Initiate isolation  precautions as appropriate- Initiate Pressure Ulcer prevention bundle as indicated  Outcome: Progressing     Problem: HEMATOLOGIC - ADULT  Goal: Maintains hematologic stability  Description: INTERVENTIONS- Assess for signs and symptoms of bleeding or hemorrhage- Monitor labs and vital signs for trends- Administer supportive blood products/factors, fluids and medications as ordered and appropriate- Administer supportive blood products/factors as ordered and appropriate  Outcome: Progressing     Problem: PAIN - ADULT  Goal: Verbalizes/displays adequate comfort level or patient's stated pain goal  Description: INTERVENTIONS:- Encourage pt to monitor pain and request assistance- Assess pain using appropriate pain scale- Administer analgesics based on type and severity of pain and evaluate response- Implement non-pharmacological measures as appropriate and evaluate response- Consider cultural and social influences on pain and pain management- Manage/alleviate anxiety- Utilize distraction and/or relaxation techniques- Monitor for opioid side effects- Notify MD/LIP if interventions unsuccessful or patient reports new pain- Anticipate increased pain with activity and pre-medicate as appropriate  Outcome: Progressing     Problem: DEATH & DYING  Goal: Pt/Family communicate acceptance of impending death and feel psychological comfort and peace  Description: INTERVENTIONS:- Assess patient/family anxiety and grief process related to end of life issues- Provide emotional and spiritual support- Provide information about the patient’s health status with consideration of family and cultural values- Communicate willingness to discuss death and facilitate grief process  with patient/family as appropriate- Emphasize sustaining relationships within family system and community, or julia/spiritual traditions- Initiate Spiritual Care consult as needed  Outcome: Progressing

## 2025-04-30 NOTE — PROGRESS NOTES
[facility]   part of [facility]     Hospitalist Progress Note     Lencho Torres Patient Status:  Inpatient    1955 MRN [id number]   Location [facility] 4NW-A Attending Elliot Phoenix MD   Hosp Day # 12 PCP Shayan Harris MD     Chief Complaint: abd pain     Subjective:     Patient resting on fentanyl drip.  Met with family at bedside    Objective:    Review of Systems:   Unable to complete    Vital signs:  Temp:  [99.5 °F (37.5 °C)-101 °F (38.3 °C)] 101 °F (38.3 °C)  Pulse:  [112-120] 120  Resp:  [11-22] 14  BP: (76-92)/(53-61) 76/53  SpO2:  [81 %-85 %] 83 %    Physical Exam:    General: NAD  Respiratory:coarse  Cardiovascular: S1, S2.      Diagnostic Data:    Labs:  Recent Labs   Lab 25  0611 25  0549 25  0747   WBC 0.2* 0.2* 0.2*   HGB 12.9* 12.1* 14.1   MCV 88.1 87.8 88.0   PLT 35.0* 36.0* 36.0*   INR  --   --  1.38*       Recent Labs   Lab 25  0611 25  0549 25  0747   * 102* 234*   BUN 38* 28* 32*   CREATSERUM 1.94* 1.61* 1.80*   CA 7.0* 7.0* 6.6*   ALB 3.2 3.1* 2.9*    142 141   K 3.4* 3.2* 3.0*    107 105   CO2 26.0 26.0 21.0   ALKPHO 137* 123* 177*   AST 30 21 212*   ALT 50* 37 148*   BILT 1.2* 1.4* 1.9*   TP 5.0* 4.8* 4.5*       Estimated Glomerular Filtration Rate: 40 mL/min/1.73m2 (A) (result from lab).     Recent Labs   Lab 25  0747   TROPHS 1,136*       Recent Labs   Lab 25  0747   PTP 17.1*   INR 1.38*              Imaging: Imaging data reviewed in Epic.    Medications: Scheduled Medications[1]    Assessment & Plan:     #Resp failure  #Cardiac arrest/VT arrest/torsades  #Diffuse large B cell lymphoma  #Chemo induced pancytopenia   #Tumor lysis syndrome  -S/p rasburicase on  and   #LISSETT, d/t tumor lysis syndrome/hypercalcemia     #Cancer-related pain  # Situational anxiety, depression  #Hypokalemia      Dispo:cont. Comfort care.  Met and discussed with pt famly at bedside.  Discussed with crit  care            Elliot Phoenix MD    Supplementary Documentation:     Quality:    DVT Mechanical Prophylaxis:   SCDs,    DVT Pharmacologic Prophylaxis   Medication   None                Code Status: DNAR/Comfort Care  Crowley: No urinary catheter in place  Crowley Duration (in days):   Central line:    KAY:       Discharge is dependent on: clinical stability  At this point Mr. Torres is expected to be discharge to: home    The 21st Century Cures Act makes medical notes like these available to patients in the interest of transparency. Please be advised this is a medical document. Medical documents are intended to carry relevant information, facts as evident, and the clinical opinion of the practitioner. The medical note is intended as peer to peer communication and may appear blunt or direct. It is written in medical language and may contain abbreviations or verbiage that are unfamiliar.                       [1]    scopolamine  1 patch Transdermal Q72H

## 2025-04-30 NOTE — PROGRESS NOTES
[facility]   part of [facility]     Hospitalist Progress Note     Lencho Torres Patient Status:  Inpatient    1955 MRN [id number]   Location [facility] 4NW-A Attending Elliot Phoenix MD   Hosp Day # 12 PCP Shayan Harris MD     Chief Complaint: abd pain     Subjective:     Patient is comfortable on morphine drip    Objective:    Review of Systems:   Unable to complete    Vital signs:  Temp:  [99.5 °F (37.5 °C)-103.8 °F (39.9 °C)] 103.8 °F (39.9 °C)  Pulse:  [112-120] 120  Resp:  [11-22] 14  BP: (70-92)/(50-61) 70/50  SpO2:  [81 %-85 %] 83 %    Physical Exam:    General: NAD  Respiratory:coarse  Cardiovascular: S1, S2.      Diagnostic Data:    Labs:  Recent Labs   Lab 25  0611 25  0549 25  0747   WBC 0.2* 0.2* 0.2*   HGB 12.9* 12.1* 14.1   MCV 88.1 87.8 88.0   PLT 35.0* 36.0* 36.0*   INR  --   --  1.38*       Recent Labs   Lab 25  0611 25  0549 25  0747   * 102* 234*   BUN 38* 28* 32*   CREATSERUM 1.94* 1.61* 1.80*   CA 7.0* 7.0* 6.6*   ALB 3.2 3.1* 2.9*    142 141   K 3.4* 3.2* 3.0*    107 105   CO2 26.0 26.0 21.0   ALKPHO 137* 123* 177*   AST 30 21 212*   ALT 50* 37 148*   BILT 1.2* 1.4* 1.9*   TP 5.0* 4.8* 4.5*       Estimated Glomerular Filtration Rate: 40 mL/min/1.73m2 (A) (result from lab).     Recent Labs   Lab 25  0747   TROPHS 1,136*       Recent Labs   Lab 25  0747   PTP 17.1*   INR 1.38*              Imaging: Imaging data reviewed in Epic.    Medications: Scheduled Medications[1]    Assessment & Plan:     #Resp failure  #Cardiac arrest/VT arrest/torsades  #Diffuse large B cell lymphoma  #Chemo induced pancytopenia   #Tumor lysis syndrome  -S/p rasburicase on  and   #LISSETT, d/t tumor lysis syndrome/hypercalcemia     #Cancer-related pain  # Situational anxiety, depression  #Hypokalemia      Dispo:cont. Comfort care. Wife at bedside.  Appears comfortable            Elliot Phoenix MD    Supplementary Documentation:      Quality:    DVT Mechanical Prophylaxis:   SCDs,    DVT Pharmacologic Prophylaxis   Medication   None                Code Status: DNAR/Comfort Care  Crowley: No urinary catheter in place  Crowley Duration (in days):   Central line:    KAY:       Discharge is dependent on: clinical stability  At this point Mr. Torres is expected to be discharge to: home    The 21st Century Cures Act makes medical notes like these available to patients in the interest of transparency. Please be advised this is a medical document. Medical documents are intended to carry relevant information, facts as evident, and the clinical opinion of the practitioner. The medical note is intended as peer to peer communication and may appear blunt or direct. It is written in medical language and may contain abbreviations or verbiage that are unfamiliar.                       [1]    scopolamine  1 patch Transdermal Q72H

## 2025-04-30 NOTE — SPIRITUAL CARE NOTE
Spiritual Care Visit Note    Patient Name: Lencho Torres Date of Spiritual Care Visit: 25   : 1955 Primary Dx: <principal problem not specified>       Referred By: Referral From: Nurse, Family    Spiritual Care Taxonomy:    Intended Effects: Journeying with someone in the grief process    Methods: Encourage sharing of feelings, Offer spiritual/Jain support, Offer emotional support    Interventions: Acknowledge current situation, Active listening, Facilitate closure, Identify supportive relationship(s), Wilbur    Visit Type/Summary:     responded to the call. Provided compassionate listening presence and grief support to the family (brother and family present from [place]) in midst of death. The brother tearfully shared their story. Journeyed with them in the grief process. Affirmed the depth of their grief and loss. Expressed emotions/Processed feelings/Received validation/Shared reminiscences/tearfully verbalized about their loss/expressed greater hope. Provided resources regarding next steps and weekly support group that are freely available here in the hospital. Per request, comforting Scriptural readings / end of life prayers were offered. Promoted a sense of peace, comfort and understanding. Extended support to our team members.    Spiritual Care support can be requested via an Epic consult.   For urgent/immediate needs, please contact the On Call  at: Edward: ext [phone]    Rev. Pato Camacho M.Div., M.T.S.,   Certified Grief Counseling Specialist  Advanced Practice Board Certified

## 2025-04-30 NOTE — PROGRESS NOTES
Resumed care at 2130, pt somnolent on room air. Currently on a 4mg morphine gtt. Comfort care. LT eye remaining open. Family at bedside. Scope patch behind LT ear.

## 2025-05-01 NOTE — PROGRESS NOTES
Provider Clarification   Additional information on the patient's renal status.    LISSETT due to acute tubular necrosis     This note is part of the patient's medical record.

## 2025-05-01 NOTE — PAYOR COMM NOTE
--------------  DISCHARGE REVIEW    Payor: JESSI PARKER [facility]  Subscriber #:  [id number]  Authorization Number: [id number]    Admit date: 25  Admit time:  12:33 PM  Discharge Date: 2025  8:30 PM      Admitting Physician: Henry Yao DO  Attending Physician:  No att. providers found  Primary Care Physician: Shayan Harris MD      Pt  at 1750. No pulse, verified with two Rns. MD notified. Family at bedside.             Electronically signed by Quin Delarosa RN at 2025  6:01 PM    Chief Complaint: abd pain      Subjective:  Patient is comfortable on morphine drip     Objective:  Review of Systems:   Unable to complete     Vital signs:  Temp:  [99.5 °F (37.5 °C)-103.8 °F (39.9 °C)] 103.8 °F (39.9 °C)  Pulse:  [112-120] 120  Resp:  [11-22] 14  BP: (70-92)/(50-61) 70/50  SpO2:  [81 %-85 %] 83 %     Physical Exam:    General: NAD  Respiratory:coarse  Cardiovascular: S1, S2.        Diagnostic Data:    Labs:        Recent Labs   Lab 25  0611 25  0549 25  0747   WBC 0.2* 0.2* 0.2*   HGB 12.9* 12.1* 14.1   MCV 88.1 87.8 88.0   PLT 35.0* 36.0* 36.0*   INR  --   --  1.38*               Recent Labs   Lab 25  0611 25  0549 25  0747   * 102* 234*   BUN 38* 28* 32*   CREATSERUM 1.94* 1.61* 1.80*   CA 7.0* 7.0* 6.6*   ALB 3.2 3.1* 2.9*    142 141   K 3.4* 3.2* 3.0*    107 105   CO2 26.0 26.0 21.0   ALKPHO 137* 123* 177*   AST 30 21 212*   ALT 50* 37 148*   BILT 1.2* 1.4* 1.9*   TP 5.0* 4.8* 4.5*         Estimated Glomerular Filtration Rate: 40 mL/min/1.73m2 (A) (result from lab).          Recent Labs   Lab 25  0747   TROPHS 1,136*             Recent Labs   Lab 2547   PTP 17.1*   INR 1.38*               Imaging: Imaging data reviewed in Epic.     Medications: [Scheduled Medications]    [Scheduled Medications]   scopolamine  1 patch Transdermal Q72H        Assessment & Plan:  #Resp failure  #Cardiac arrest/VT  arrest/torsades  #Diffuse large B cell lymphoma  #Chemo induced pancytopenia   #Tumor lysis syndrome  -S/p rasburicase on 4/18 and 4/19  #LISSETT, d/t tumor lysis syndrome/hypercalcemia     #Cancer-related pain  # Situational anxiety, depression  #Hypokalemia        Dispo:cont. Comfort care. Wife at bedside.  Appears comfortable                 Elliot Phoenix MD     Supplementary Documentation:  Quality:     DVT Mechanical Prophylaxis:   SCDs,    DVT Pharmacologic Prophylaxis   Medication   None                 Code Status: DNAR/Comfort Care  Crowley: No urinary catheter in place  Crowley Duration (in days):   Central line:    KAY:                         Electronically signed by Elliot Phoenix MD at 4/30/2025  3:42 PM

## 2025-05-01 NOTE — PLAN OF CARE
Pt . Paper works completed.  IV lines removed.  at bedside. Comfort provided to family at bedside. Endorsed to night shift RN.    Problem: GASTROINTESTINAL - ADULT  Goal: Maintains adequate nutritional intake (undernourished)  Description: INTERVENTIONS:- Monitor percentage of each meal consumed- Identify factors contributing to decreased intake, treat as appropriate- Assist with meals as needed- Monitor I&O, WT and lab values- Obtain nutritional consult as needed- Optimize oral hygiene and moisture- Encourage food from home; allow for food preferences- Enhance eating environment  2025 by Quin Delarosa RN  Outcome: Completed  2025 by Quin Delarosa RN  Outcome: Progressing     Problem: METABOLIC/FLUID AND ELECTROLYTES - ADULT  Goal: Electrolytes maintained within normal limits  Description: INTERVENTIONS:- Monitor labs and rhythm and assess patient for signs and symptoms of electrolyte imbalances- Administer electrolyte replacement as ordered- Monitor response to electrolyte replacements, including rhythm and repeat lab results as appropriate- Fluid restriction as ordered- Instruct patient on fluid and nutrition restrictions as appropriate  2025 by Quin Delarosa RN  Outcome: Completed  2025 by Quin Delarosa RN  Outcome: Progressing  Goal: Hemodynamic stability and optimal renal function maintained  Description: INTERVENTIONS:- Monitor labs and assess for signs and symptoms of volume excess or deficit- Monitor intake, output and patient weight- Monitor urine specific gravity, serum osmolarity and serum sodium as indicated or ordered- Monitor response to interventions for patient's volume status, including labs, urine output, blood pressure (other measures as available)- Encourage oral intake as appropriate- Instruct patient on fluid and nutrition restrictions as appropriate  2025 by Quin Delarosa RN  Outcome:  Completed  4/30/2025 1710 by Quin Delarosa RN  Outcome: Progressing     Problem: SKIN/TISSUE INTEGRITY - ADULT  Goal: Incision(s), wounds(s) or drain site(s) healing without S/S of infection  Description: INTERVENTIONS:- Assess and document risk factors for pressure ulcer development- Assess and document skin integrity- Assess and document dressing/incision, wound bed, drain sites and surrounding tissue- Implement wound care per orders- Initiate isolation precautions as appropriate- Initiate Pressure Ulcer prevention bundle as indicated  4/30/2025 2034 by Quin Delarosa RN  Outcome: Completed  4/30/2025 1710 by Quin Delarosa RN  Outcome: Progressing     Problem: HEMATOLOGIC - ADULT  Goal: Maintains hematologic stability  Description: INTERVENTIONS- Assess for signs and symptoms of bleeding or hemorrhage- Monitor labs and vital signs for trends- Administer supportive blood products/factors, fluids and medications as ordered and appropriate- Administer supportive blood products/factors as ordered and appropriate  4/30/2025 2034 by Quin Delarosa RN  Outcome: Completed  4/30/2025 1710 by Quin Delarosa RN  Outcome: Progressing     Problem: PAIN - ADULT  Goal: Verbalizes/displays adequate comfort level or patient's stated pain goal  Description: INTERVENTIONS:- Encourage pt to monitor pain and request assistance- Assess pain using appropriate pain scale- Administer analgesics based on type and severity of pain and evaluate response- Implement non-pharmacological measures as appropriate and evaluate response- Consider cultural and social influences on pain and pain management- Manage/alleviate anxiety- Utilize distraction and/or relaxation techniques- Monitor for opioid side effects- Notify MD/LIP if interventions unsuccessful or patient reports new pain- Anticipate increased pain with activity and pre-medicate as appropriate  4/30/2025 2034 by Quin Delarosa, SIL  Outcome:  Completed  4/30/2025 1710 by Quin Delarosa, RN  Outcome: Progressing     Problem: DEATH & DYING  Goal: Pt/Family communicate acceptance of impending death and feel psychological comfort and peace  Description: INTERVENTIONS:- Assess patient/family anxiety and grief process related to end of life issues- Provide emotional and spiritual support- Provide information about the patient’s health status with consideration of family and cultural values- Communicate willingness to discuss death and facilitate grief process  with patient/family as appropriate- Emphasize sustaining relationships within family system and community, or julia/spiritual traditions- Initiate Spiritual Care consult as needed  4/30/2025 2034 by Quin Delarosa, RN  Outcome: Completed  4/30/2025 1710 by Quin Delarosa, RN  Outcome: Progressing

## 2025-05-02 PROBLEM — Z51.5 END OF LIFE CARE: Status: ACTIVE | Noted: 2025-01-01

## 2025-05-02 NOTE — DISCHARGE SUMMARY
[facility]     Lencho Torres Patient Status:  Inpatient    1955 MRN [id number]   Location [facility] 4NW-A Attending No att. providers found   Hosp Day # 12 PCP Shayan Harris MD     Date of Admission:  2025  Date of Discharge:   2025    Discharge Disposition:  Hospice    Discharge Diagnosis:    #Resp failure  #Cardiac arrest/VT arrest/torsades  #Diffuse large B cell lymphoma  #Chemo induced pancytopenia   #Tumor lysis syndrome  #LISSETT, d/t ATN    #Cancer-related pain  # Anxiety/depression  #Hypokalemia       History of Present Illness:    Lencho Torres is a 69 year old male with pmhx of diffuse large B cell lymphoma, HTN who is directly admitted for chemotherapy infusion therapy and monitoring. He reports he is at his baseline. No chest pain/pressure, SOB/SWAN, LE swelling, fevers/chills, n/v/d, cough/congestion, URI/flu-like symptoms, recent sick contacts. He has some chronic abdominal pain and swelling, but he reports no new symptoms or change in his symptoms.     Brief Synopsis:    The patient was admitted due to tumor lysis syndrome and acute kidney injury.  He was started on dialysis, fluids and allopurinol.  He had resolution of his tumor lysis syndrome and resolution of his acute kidney injury.  On  the patient developed cardiac arrest and was transferred to the intensive care unit.  Family decided to transition to comfort care.  The patient passed away 2025 at 1750.    All diagnosis' and recommendations discussed with patient and/or family in detail.      Elliot Phoenix MD    Total minutes spent on discharge plannin      The  [employer] Act makes medical notes like these available to patients in the interest of transparency. Please be advised this is a medical document. Medical documents are intended to carry relevant information, facts as evident, and the clinical opinion of the practitioner. The medical note is intended as peer to peer  communication and may appear blunt or direct. It is written in medical language and may contain abbreviations or verbiage that are unfamiliar.

## 2025-05-15 LAB
CELLS ANALYZED B CELL LYMPHOMA: 100
CELLS COUNTED B CELL LYMPHOMA: 100

## (undated) NOTE — LETTER
Printed: 2025    Patient Name: Lencho Torres  : 1955   Medical Record #: [id number]    Consent to Cancer Treatment    I, Lencho Torres, understand that I have been diagnosed with DLBCL.    I understand that the treatment suggested by my doctor, No name on file will involve:    Drug (s): Rituximab, polituzamab, cyclophosphamide, doxorubicin, vincristine, and prednisone  Frequency/Schedule: Every 21 days   Expected length of treatment: 6 cycles    The benefit/goal of my treatment is to:  Cure my cancer  Reduce my tumor size  Prolong my life  Control my symptoms    I understand the possible benefits of this treatment if it is successful.  I also understand that my doctors cannot guarantee that the treatment will help me.    I understand that the medication(s) recommended by my doctor can have short-term and long-term effects.      My doctor talked to me about the following side effects that I might experience because of my treatment:  Allergic reactions  Constipation  Diarrhea  Fatigue  Hair loss  Heart effects  Kidney / Bladder effects  Liver effects  Low red blood cell count / Anemia  Low White Blood Cell Count/Risk of infection  Low Platelet Count/Risk of Bleeding  Mouth or Throat Sores  Nausea / Vomiting  Nerve Effects  Taste Changes    I also understand that I may stop this treatment at any time.  I understand that I could have side effects from my treatment that are not listed on this form.  Each patient can respond differently to medications, and could have side effects that have not been reported by others. I understand that complications from this treatment could even result in my death.     Alternatives to this treatment have been explained to me and could include the option of no treatment.  I also understand that I may stop this treatment at any time.    I have had the chance to ask questions about this treatment, and my questions have been answered to my satisfaction.  I understand that I can  contact my oncologist, No name on file, or my Cancer Care Team at any time if I have questions, by calling [phone].   Additional written information will be given to me prior to start of therapy.    Additionally, I will receive a copy of this consent form.    I have read and fully understand this consent to cancer treatment. I acknowledge that the explanations above were made. The physicians have answered all my questions and I consent to the use of Medication as noted above.        Date:__________  Time:________ Signed:_________________________________        Patient/Legally Authorized Representative        Date:__________ Time:________ Signed:_________________________________        Witness (employee of hospital)        CERTIFICATION OF INTERPRETATION (non-English language only)  I certify that I have read the foregoing to the signer hereof in ___________ language.    Date:__________ Time:___________    Name and ID for language line:_______________________________     _____    PHYSICIAN AFFIRMATION/DECLARATION  I have discussed the nature, purpose and risks of cancer treatment, alternative methods of treatment and the possibility of complications, including, but not limited to death, with the patient and/or the patient’s authorized representative. I have given the opportunity to ask questions and have answered any such questions.      Date:__________  Time:________ Signed:_________________________________      Patient Name: Lencho Torres  : 1955   Medical Record #: [id number]

## (undated) NOTE — LETTER
Patient Name: Lencho Torres        : 1955       Medical Record #: [id number]    CONSENT FOR PROCEDURES/SEDATION    Date: 4/10/2025       Time: 1:13 PM        1. I authorize the performance upon Lencho Torres the following:         Image guided retroperitoneal mass biopsy                                                                              2. I authorize Dr. Trav Scott (and whomever is designated as the doctor’s assistant), to perform the above mentioned procedures.    3. If any unforeseen conditions arise during this procedure calling for additional procedures, operations, or medications (including anesthesia and blood transfusion), I  further request and authorize the doctor to do whatever he/she deems advisable in my interest.    4. I consent to the taking and reproduction of any photographs in the course of this procedure for professional purposes.    5. I consent to the administration of such sedation as may be considered necessary or advisable by the physician responsible for this service, with the exception of        none    .    6. I have been informed by my doctor of the nature and purpose of this procedure/sedation, possible alternative methods of treatment, risk involved and possible complications.      Signature of Patient:  ___________________________    Signature of person authorized to consent for patient: Relationship to patient:  ___________________________    ___________________    Witness: ____________________     Date: ______________    Provider: ____________________     Date: ______________

## (undated) NOTE — LETTER
Patient Name: Lencho Torres        : 1955       Medical Record #: [id number]    CONSENT FOR PROCEDURES/SEDATION    Date: 4/10/2025       Time: 2:48 PM        1. I authorize the performance upon Lencho Torres the following:         Image guided retroperitoneal and/or abdominal mass biopsy                     2. I authorize Dr. Trav Scott (and whomever is designated as the doctor’s assistant), to perform the above mentioned procedures.    3. If any unforeseen conditions arise during this procedure calling for additional procedures, operations, or medications (including anesthesia and blood transfusion), I  further request and authorize the doctor to do whatever he/she deems advisable in my interest.    4. I consent to the taking and reproduction of any photographs in the course of this procedure for professional purposes.    5. I consent to the administration of such sedation as may be considered necessary or advisable by the physician responsible for this service, with the exception of       None      .    6. I have been informed by my doctor of the nature and purpose of this procedure/sedation, possible alternative methods of treatment, risk involved and possible complications.      Signature of Patient:  ___________________________    Signature of person authorized to consent for patient: Relationship to patient:  ___________________________    ___________________    Witness: ____________________     Date: ______________    Provider: ____________________     Date: ______________

## (undated) NOTE — LETTER
08/18/21        Reginald Osei  [address]  [place] [address]      Dear Ida Ortiz records indicate that you have outstanding lab work and or testing that was ordered for you and has not yet been completed:  Orders Placed This Encounter

## (undated) NOTE — LETTER
[address]  [address]  Consent for Procedure/Sedation  Date: 4/21/25         Time: 0830    I hereby authorize Dr Lam, my physician and his/her assistants (if applicable), which may include medical students, residents, and/or fellows, to perform the following surgical operation/ procedure and administer such anesthesia as may be determined necessary by my physician: Temporary Dialysis Catheter Insertion on Lencho Torres  2.   I recognize that during the surgical operation/procedure, unforeseen conditions may necessitate additional or different procedures than those listed above.  I, therefore, further authorize and request that the above-named surgeon, assistants, or designees perform such procedures as are, in their judgment, necessary and desirable.    3.   My surgeon/physician has discussed prior to my surgery the potential benefits, risks and side effects of this procedure; the likelihood of achieving goals; and potential problems that might occur during recuperation.  They also discussed reasonable alternatives to the procedure, including risks, benefits, and side effects related to the alternatives and risks related to not receiving this procedure.  I have had all my questions answered and I acknowledge that no guarantee has been made as to the result that may be obtained.    4.   Should the need arise during my operation/procedure, which includes change of level of care prior to discharge, I also consent to the administration of blood and/or blood products.  Further, I understand that despite careful testing and screening of blood or blood products by collecting agencies, I may still be subject to ill effects as a result of receiving a blood transfusion and/or blood products.  The following are some, but not all, of the potential risks that can occur: fever and allergic reactions, hemolytic reactions, transmission of diseases such as Hepatitis, AIDS and Cytomegalovirus  (CMV) and fluid overload.  In the event that I wish to have an autologous transfusion of my own blood, or a directed donor transfusion, I will discuss this with my physician.   Check only if Refusing Blood or Blood Products  I understand refusal of blood or blood products as deemed necessary by my physician may have serious consequences to my condition to include possible death. I hereby assume responsibility for my refusal and release the hospital, its personnel, and my physicians from any responsibility for the consequences of my refusal.         o  Refuse         5.   I authorize the use of any specimen, organs, tissues, body parts or foreign objects that may be removed from my body during the operation/procedure for diagnosis, research or teaching purposes and their subsequent disposal by hospital authorities.  I also authorize the release of specimen test results and/or written reports to my treating physician on the hospital medical staff or other referring or consulting physicians involved in my care, at the discretion of the Pathologist or my treating physician.    6.   I consent to the photographing or videotaping of the operations or procedures to be performed, including appropriate portions of my body for medical, scientific, or educational purposes, provided my identity is not revealed by the pictures or by descriptive texts accompanying them.  If the procedure has been photographed/videotaped, the surgeon will obtain the original picture, image, videotape or CD.  The hospital will not be responsible for storage, release or maintenance of the picture, image, tape or CD.    7.   I consent to the presence of a  or observers in the operating room as deemed necessary by my physician or their designees.    8.   I recognize that in the event my procedure results in extended X-Ray/fluoroscopy time, I may develop a skin reaction.    9. If I have a Do Not Attempt Resuscitation (DNAR) order in  place, that status will be suspended while in the operating room, procedural suite, and during the recovery period unless otherwise explicitly stated by me (or a person authorized to consent on my behalf). The surgeon or my attending physician will determine when the applicable recovery period ends for purposes of reinstating the DNAR order.  10. Patients having a sterilization procedure: I understand that if the procedure is successful the results will be permanent and it will therefore be impossible for me to inseminate, conceive, or bear children.  I also understand that the procedure is intended to result in sterility, although the result has not been guaranteed.   11. I acknowledge that my physician has explained sedation/analgesia administration to me including the risk and benefits I consent to the administration of sedation/analgesia as may be necessary or desirable in the judgment of my physician.    I CERTIFY THAT I HAVE READ AND FULLY UNDERSTAND THE ABOVE CONSENT TO OPERATION and/or OTHER PROCEDURE.        ____________________________________       _________________________________      ______________________________  Signature of Patient         Signature of Responsible Person        Printed Name of Responsible Person        ____________________________________      _________________________________      ______________________________       Signature of Witness          Relationship to Patient                       Date                                       Time  Patient Name: Lencho Torres  : 1955    Reviewed: 2024   Printed: 2025  Medical Record #: [id number] Page 1 of 1

## (undated) NOTE — Clinical Note
05/25/2017        Gage Mis  [address]  [place] [address]      Dear Kimberley Shaffre records indicate that you have outstanding lab work and or testing that was ordered for you and has not yet been completed:      Comp Metabolic Panel (